# Patient Record
Sex: FEMALE | Employment: FULL TIME | URBAN - METROPOLITAN AREA
[De-identification: names, ages, dates, MRNs, and addresses within clinical notes are randomized per-mention and may not be internally consistent; named-entity substitution may affect disease eponyms.]

---

## 2017-01-23 DIAGNOSIS — I10 ESSENTIAL HYPERTENSION WITH GOAL BLOOD PRESSURE LESS THAN 140/90: Primary | ICD-10-CM

## 2017-01-23 DIAGNOSIS — I10 HYPERTENSION GOAL BP (BLOOD PRESSURE) < 140/90: ICD-10-CM

## 2017-01-23 RX ORDER — LISINOPRIL 10 MG/1
10 TABLET ORAL DAILY
Qty: 30 TABLET | Refills: 0 | Status: SHIPPED | OUTPATIENT
Start: 2017-01-23 | End: 2017-03-27 | Stop reason: SINTOL

## 2017-01-23 RX ORDER — METOPROLOL SUCCINATE 100 MG/1
100 TABLET, EXTENDED RELEASE ORAL DAILY
Qty: 30 TABLET | Refills: 0 | Status: SHIPPED | OUTPATIENT
Start: 2017-01-23 | End: 2017-03-03

## 2017-01-26 ENCOUNTER — TELEPHONE (OUTPATIENT)
Dept: FAMILY MEDICINE | Facility: CLINIC | Age: 57
End: 2017-01-26

## 2017-01-26 DIAGNOSIS — I10 HYPERTENSION GOAL BP (BLOOD PRESSURE) < 140/90: Primary | ICD-10-CM

## 2017-01-26 NOTE — TELEPHONE ENCOUNTER
Amlodipine Besylate 10mg      Last Written Prescription Date: 02/29/2016 #90 x 1  Last filled 12/28/2016  Last Office Visit with [facility], [facility] or [facility] prescribing provider:  02/29/2016 STEPHEN Langley     Future Office Visit:    Next 5 appointments (look out 90 days)     Feb 06, 2017 10:20 AM   PHYSICAL with ASCENCION Guzman CNP   [facility]    [address] [id number] [phone]                    BP Readings from Last 3 Encounters:   11/10/16 174/112   03/30/16 150/98   02/29/16 140/92

## 2017-01-26 NOTE — TELEPHONE ENCOUNTER
Panel Management Review      Patient has the following on her problem list:     Hypertension   Last three blood pressure readings:  BP Readings from Last 3 Encounters:   11/10/16 174/112   03/30/16 150/98   02/29/16 140/92     Blood pressure: Failed    HTN Guidelines:  Age 18-59 BP range:  Less than 140/90  Age 60-85 with Diabetes:  Less than 140/90  Age 60-85 without Diabetes:  less than 150/90      Composite cancer screening  Chart review shows that this patient is due/due soon for the following Mammogram  Summary:    Patient is due/failing the following:   Blood pressure and mammogram    Action needed:   Patient needs nurse only appointment. and Patient needs referral/order: mammogram    Type of outreach:    none, patient has an appt scheduled for 2/6/17 will address at that time    Questions for provider review:    None                                                                                                                                    Mary Lou BENAVIDEZ       Chart routed to none .

## 2017-01-27 NOTE — TELEPHONE ENCOUNTER
Routing refill request to provider for review/approval because:  BP above goal    Rossy Bautista RN

## 2017-01-29 RX ORDER — AMLODIPINE BESYLATE 10 MG/1
10 TABLET ORAL DAILY
Qty: 90 TABLET | Refills: 1 | Status: SHIPPED | OUTPATIENT
Start: 2017-01-29 | End: 2017-03-27

## 2017-02-21 ENCOUNTER — TELEPHONE (OUTPATIENT)
Dept: FAMILY MEDICINE | Facility: CLINIC | Age: 57
End: 2017-02-21

## 2017-02-21 NOTE — LETTER
[facility]  [address] [place] [id number]  [phone]      February 21, 2017      Caty Llamas  [address]  [place] [address]        Dear Caty,     As part of [facility]'s commitment to health and wellness we have recently reviewed your chart and your medical record indicates that you are due for one or more of the following:    -- Blood pressure check. I noticed that your blood pressure was elevated at a recent visit.  Please call to schedule a nurse only visit to have it rechecked.    -- Mammogram. Please call one of the following numbers to schedule:  [facility] [phone]  [facility] [phone]  [facility] [phone]  [facility] [phone]  [facility] [phone]  [facility] [phone]    Please try to schedule and/or complete the tests above within the next 2-4 weeks.   The number to call to schedule an appointment at [facility] is [phone].    While we work hard to maintain accurate records on all our patients, it is always possible that this notice does not accurately reflect tests that you may have had. To ensure that we do not continue to send you notices please verify, at your next visit or by a mytraxt message, that we have accurate dates of your tests, even if these were done many years ago.     Working together for your health is our goal.    Thank you for choosing [facility] for your healthcare needs.      Sincerely,     ANGEL Winn/ iris

## 2017-02-21 NOTE — TELEPHONE ENCOUNTER
Panel Management Review      Patient has the following on her problem list:     Hypertension   Last three blood pressure readings:  BP Readings from Last 3 Encounters:   11/10/16 (!) 174/112   03/30/16 (!) 150/98   02/29/16 (!) 140/92     Blood pressure: Failed    HTN Guidelines:  Age 18-59 BP range:  Less than 140/90  Age 60-85 with Diabetes:  Less than 140/90  Age 60-85 without Diabetes:  less than 150/90      Composite cancer screening  Chart review shows that this patient is due/due soon for the following Mammogram  Summary:    Patient is due/failing the following:   MAMMOGRAM and BP     Action needed:   Orders for MAMMOGRAM; Nurse only for BP CHECK    Type of outreach:    Sent letter.    Questions for provider review:    None                                                                                                                                    Miryam Castrejon CMA (AAMA)       Chart routed to None .

## 2017-03-03 DIAGNOSIS — I10 HYPERTENSION GOAL BP (BLOOD PRESSURE) < 140/90: ICD-10-CM

## 2017-03-03 NOTE — TELEPHONE ENCOUNTER
Routing refill request to provider for review/approval because:  Patient needs to be seen because it has been more than 1 year since last office visit.    Rossy Bautista RN

## 2017-03-03 NOTE — TELEPHONE ENCOUNTER
Reason for Call:  Medication or medication refill:    Do you use a [facility]?  Name of the pharmacy and phone number for the current request:  See above    Name of the medication requested: metoprolol    Other request: patient ask if possible could she get it sent over today.    Can we leave a detailed message on this number? YES    Phone number patient can be reached at: Home number on file [phone] (home)    Best Time:     Call taken on 3/3/2017 at 1:31 PM by Analia Mary

## 2017-03-05 RX ORDER — METOPROLOL SUCCINATE 100 MG/1
100 TABLET, EXTENDED RELEASE ORAL DAILY
Qty: 30 TABLET | Refills: 0 | Status: SHIPPED | OUTPATIENT
Start: 2017-03-05 | End: 2017-03-27

## 2017-03-27 ENCOUNTER — OFFICE VISIT (OUTPATIENT)
Dept: FAMILY MEDICINE | Facility: CLINIC | Age: 57
End: 2017-03-27
Payer: COMMERCIAL

## 2017-03-27 ENCOUNTER — RADIANT APPOINTMENT (OUTPATIENT)
Dept: GENERAL RADIOLOGY | Facility: CLINIC | Age: 57
End: 2017-03-27
Attending: NURSE PRACTITIONER
Payer: COMMERCIAL

## 2017-03-27 ENCOUNTER — RESULT FOLLOW UP (OUTPATIENT)
Dept: FAMILY MEDICINE | Facility: CLINIC | Age: 57
End: 2017-03-27

## 2017-03-27 VITALS
DIASTOLIC BLOOD PRESSURE: 90 MMHG | SYSTOLIC BLOOD PRESSURE: 130 MMHG | HEART RATE: 80 BPM | WEIGHT: 222.8 LBS | TEMPERATURE: 98.1 F | BODY MASS INDEX: 41 KG/M2 | HEIGHT: 62 IN

## 2017-03-27 DIAGNOSIS — G89.29 CHRONIC PAIN OF LEFT KNEE: ICD-10-CM

## 2017-03-27 DIAGNOSIS — Z13.6 CARDIOVASCULAR SCREENING; LDL GOAL LESS THAN 160: ICD-10-CM

## 2017-03-27 DIAGNOSIS — R05.9 COUGH: ICD-10-CM

## 2017-03-27 DIAGNOSIS — R87.612 PAPANICOLAOU SMEAR OF CERVIX WITH LOW GRADE SQUAMOUS INTRAEPITHELIAL LESION (LGSIL): ICD-10-CM

## 2017-03-27 DIAGNOSIS — I10 HYPERTENSION GOAL BP (BLOOD PRESSURE) < 140/90: ICD-10-CM

## 2017-03-27 DIAGNOSIS — Z12.31 VISIT FOR SCREENING MAMMOGRAM: ICD-10-CM

## 2017-03-27 DIAGNOSIS — E66.01 MORBID OBESITY DUE TO EXCESS CALORIES (H): ICD-10-CM

## 2017-03-27 DIAGNOSIS — Z11.59 NEED FOR HEPATITIS C SCREENING TEST: ICD-10-CM

## 2017-03-27 DIAGNOSIS — H91.93 BILATERAL CHANGE IN HEARING: ICD-10-CM

## 2017-03-27 DIAGNOSIS — Z01.419 ENCOUNTER FOR GYNECOLOGICAL EXAMINATION WITHOUT ABNORMAL FINDING: Primary | ICD-10-CM

## 2017-03-27 DIAGNOSIS — M25.562 CHRONIC PAIN OF LEFT KNEE: ICD-10-CM

## 2017-03-27 LAB
ANION GAP SERPL CALCULATED.3IONS-SCNC: 5 MMOL/L (ref 3–14)
BUN SERPL-MCNC: 17 MG/DL (ref 7–30)
CALCIUM SERPL-MCNC: 8.9 MG/DL (ref 8.5–10.1)
CHLORIDE SERPL-SCNC: 106 MMOL/L (ref 94–109)
CHOLEST SERPL-MCNC: 217 MG/DL
CO2 SERPL-SCNC: 27 MMOL/L (ref 20–32)
CREAT SERPL-MCNC: 0.7 MG/DL (ref 0.52–1.04)
CREAT UR-MCNC: 42 MG/DL
GFR SERPL CREATININE-BSD FRML MDRD: 85 ML/MIN/1.7M2
GLUCOSE SERPL-MCNC: 98 MG/DL (ref 70–99)
HDLC SERPL-MCNC: 58 MG/DL
LDLC SERPL CALC-MCNC: 143 MG/DL
MICROALBUMIN UR-MCNC: 6 MG/L
MICROALBUMIN/CREAT UR: 13.06 MG/G CR (ref 0–25)
NONHDLC SERPL-MCNC: 159 MG/DL
POTASSIUM SERPL-SCNC: 3.7 MMOL/L (ref 3.4–5.3)
SODIUM SERPL-SCNC: 138 MMOL/L (ref 133–144)
TRIGL SERPL-MCNC: 79 MG/DL

## 2017-03-27 PROCEDURE — 99396 PREV VISIT EST AGE 40-64: CPT | Performed by: NURSE PRACTITIONER

## 2017-03-27 PROCEDURE — 36415 COLL VENOUS BLD VENIPUNCTURE: CPT | Performed by: NURSE PRACTITIONER

## 2017-03-27 PROCEDURE — G0145 SCR C/V CYTO,THINLAYER,RESCR: HCPCS | Performed by: NURSE PRACTITIONER

## 2017-03-27 PROCEDURE — 71020 XR CHEST 2 VW: CPT

## 2017-03-27 PROCEDURE — 80048 BASIC METABOLIC PNL TOTAL CA: CPT | Performed by: NURSE PRACTITIONER

## 2017-03-27 PROCEDURE — 86803 HEPATITIS C AB TEST: CPT | Performed by: NURSE PRACTITIONER

## 2017-03-27 PROCEDURE — 80061 LIPID PANEL: CPT | Performed by: NURSE PRACTITIONER

## 2017-03-27 PROCEDURE — 87624 HPV HI-RISK TYP POOLED RSLT: CPT | Performed by: NURSE PRACTITIONER

## 2017-03-27 PROCEDURE — 82043 UR ALBUMIN QUANTITATIVE: CPT | Performed by: NURSE PRACTITIONER

## 2017-03-27 RX ORDER — METOPROLOL SUCCINATE 100 MG/1
100 TABLET, EXTENDED RELEASE ORAL DAILY
Qty: 90 TABLET | Refills: 3 | Status: SHIPPED | OUTPATIENT
Start: 2017-03-27 | End: 2017-05-17

## 2017-03-27 RX ORDER — METOPROLOL SUCCINATE 100 MG/1
100 TABLET, EXTENDED RELEASE ORAL DAILY
Qty: 30 TABLET | Refills: 0 | Status: SHIPPED | OUTPATIENT
Start: 2017-03-27 | End: 2017-03-27

## 2017-03-27 RX ORDER — AMLODIPINE BESYLATE 10 MG/1
10 TABLET ORAL DAILY
Qty: 90 TABLET | Refills: 3 | Status: SHIPPED | OUTPATIENT
Start: 2017-03-27 | End: 2017-05-17

## 2017-03-27 NOTE — MR AVS SNAPSHOT
After Visit Summary   3/27/2017    Caty Llamas    MRN: [id number]           Patient Information     Date Of Birth          1960        Visit Information        Provider Department      3/27/2017 10:20 AM Cass Langley APRN [facility]        Today's Diagnoses     Encounter for gynecological examination without abnormal finding    -  1    Visit for screening mammogram        Hypertension goal BP (blood pressure) < 140/90        Papanicolaou smear of cervix with low grade squamous intraepithelial lesion (LGSIL)        Need for hepatitis C screening test        CARDIOVASCULAR SCREENING; LDL GOAL LESS THAN 160        Morbid obesity due to excess calories (H)        Bilateral change in hearing        Cough        Chronic pain of left knee          Care Instructions      Preventive Health Recommendations  Female Ages 50 - 64    Yearly exam: See your health care provider every year in order to  o Review health changes.   o Discuss preventive care.    o Review your medicines if your doctor has prescribed any.      Get a Pap test every three years (unless you have an abnormal result and your provider advises testing more often).    If you get Pap tests with HPV test, you only need to test every 5 years, unless you have an abnormal result.     You do not need a Pap test if your uterus was removed (hysterectomy) and you have not had cancer.    You should be tested each year for STDs (sexually transmitted diseases) if you're at risk.     Have a mammogram every 1 to 2 years.    Have a colonoscopy at age 50, or have a yearly FIT test (stool test). These exams screen for colon cancer.      Have a cholesterol test every 5 years, or more often if advised.    Have a diabetes test (fasting glucose) every three years. If you are at risk for diabetes, you should have this test more often.     If you are at risk for osteoporosis (brittle bone disease), think about having a bone density  scan (DEXA).    Shots: Get a flu shot each year. Get a tetanus shot every 10 years.    Nutrition:     Eat at least 5 servings of fruits and vegetables each day.    Eat whole-grain bread, whole-wheat pasta and brown rice instead of white grains and rice.    Talk to your provider about Calcium and Vitamin D.     Lifestyle    Exercise at least 150 minutes a week (30 minutes a day, 5 days a week). This will help you control your weight and prevent disease.    Limit alcohol to one drink per day.    No smoking.     Wear sunscreen to prevent skin cancer.     See your dentist every six months for an exam and cleaning.    See your eye doctor every 1 to 2 years.      Work on weight loss  For weight loss check [employer] This is a free web site for weight loss.       Stay well hydrated - urine should be clear.      Exercise   30-60 min daily    Get your ears checked and then see ENT    Wear the knee brace during day and off at night     For the chest x-ray  I will send you the results.     No medication changes at this time                 Follow-ups after your visit        Additional Services     AUDIOLOGY ADULT REFERRAL       Your provider has referred you to: [facility]: [facility] [phone]   [email or web address]    Specialty Testing:  Audiogram w/Tymps and Reflexes (Comprehensive Audiology Evaluation)            OTOLARYNGOLOGY REFERRAL       Your provider has referred you to: [facility]: [facility] [phone]   [email or web address]    Please be aware that coverage of these services is subject to the terms and limitations of your health insurance plan.  Call member services at your health plan with any benefit or coverage questions.      Please bring the following with you to your appointment:    (1) Any X-Rays, CTs or MRIs which have been performed.  Contact the facility where they were done to arrange for  prior to your  "scheduled appointment.   (2) List of current medications  (3) This referral request   (4) Any documents/labs given to you for this referral                  Future tests that were ordered for you today     Open Future Orders        Priority Expected Expires Ordered    XR Chest 2 Views Routine 3/27/2017 3/27/2018 3/27/2017    MA Screening Digital Bilateral Routine  3/28/2018 3/27/2017            Who to contact     Normal or non-critical lab and imaging results will be communicated to you by [employer]art, letter or phone within 4 business days after the clinic has received the results. If you do not hear from us within 7 days, please contact the clinic through [employer] or phone. If you have a critical or abnormal lab result, we will notify you by phone as soon as possible.  Submit refill requests through [employer] or call your pharmacy and they will forward the refill request to us. Please allow 3 business days for your refill to be completed.          If you need to speak with a  for additional information , please call: [phone]           Additional Information About Your Visit        [employer] Information     [employer] lets you send messages to your doctor, view your test results, renew your prescriptions, schedule appointments and more. To sign up, go to [email or web address] . Click on \"Log in\" on the left side of the screen, which will take you to the Welcome page. Then click on \"Sign up Now\" on the right side of the page.     You will be asked to enter the access code listed below, as well as some personal information. Please follow the directions to create your username and password.     Your access code is: [email or web address]  Expires: 2017 12:24 PM     Your access code will  in 90 days. If you need help or a new code, please call your [place] clinic or [phone].        Care EveryWhere ID     This is your Care EveryWhere ID. This could be used by other organizations to access your " "[place] medical records  [id number]        Your Vitals Were     Pulse Temperature Height Breastfeeding? BMI (Body Mass Index)       80 98.1  F (36.7  C) (Tympanic) 5' 2.4\" (1.585 m) No 40.23 kg/m2        Blood Pressure from Last 3 Encounters:   03/27/17 130/90   11/10/16 (!) 174/112   03/30/16 (!) 150/98    Weight from Last 3 Encounters:   03/27/17 222 lb 12.8 oz (101.1 kg)   03/30/16 217 lb (98.4 kg)   02/29/16 214 lb 12.8 oz (97.4 kg)              We Performed the Following     **Hepatitis C Screen Reflex to RNA FUTURE anytime     Albumin Random Urine Quantitative     AUDIOLOGY ADULT REFERRAL     Basic metabolic panel  (Ca, Cl, CO2, Creat, Gluc, K, Na, BUN)     HPV High Risk Types DNA Cervical     Lipid Profile with reflex to direct LDL     OTOLARYNGOLOGY REFERRAL     Pap imaged thin layer screen with HPV - recommended age 30 - 65 years (select HPV order below)          Today's Medication Changes          These changes are accurate as of: 3/27/17 11:33 AM.  If you have any questions, ask your nurse or doctor.               Start taking these medicines.        Dose/Directions    metoprolol 100 MG 24 hr tablet   Commonly known as:  TOPROL-XL   Used for:  Hypertension goal BP (blood pressure) < 140/90   Started by:  Cass Langley APRN CNP        Dose:  100 mg   Take 1 tablet (100 mg) by mouth daily   Quantity:  90 tablet   Refills:  3       order for DME   Used for:  Chronic pain of left knee   Started by:  Cass Langley APRN CNP        Equipment being ordered: knee brace,   Quantity:  1 Device   Refills:  0         These medicines have changed or have updated prescriptions.        Dose/Directions    amLODIPine 10 MG tablet   Commonly known as:  NORVASC   This may have changed:  additional instructions   Used for:  Hypertension goal BP (blood pressure) < 140/90   Changed by:  Cass Langley APRN CNP        Dose:  10 mg   Take 1 tablet (10 mg) by mouth daily   Quantity:  90 tablet "   Refills:  3         Stop taking these medicines if you haven't already. Please contact your care team if you have questions.     lisinopril 10 MG tablet   Commonly known as:  PRINIVIL/ZESTRIL   Stopped by:  Cass Langley APRN CNP                Where to get your medicines      These medications were sent to [facility]/pharmacy #[id number] - [place], [place] - [address],  AT [address] [facility]  [address], , [facility] [address]     Phone:  [phone]     amLODIPine 10 MG tablet    metoprolol 100 MG 24 hr tablet         Some of these will need a paper prescription and others can be bought over the counter.  Ask your nurse if you have questions.     Bring a paper prescription for each of these medications     order for DME                Primary Care Provider Office Phone # Fax #    ASCENCION Guzman [phone]       [facility] [address] [place] [address]   [facility] [address]        Thank you!     Thank you for choosing [facility]  for your care. Our goal is always to provide you with excellent care. Hearing back from our patients is one way we can continue to improve our services. Please take a few minutes to complete the written survey that you may receive in the mail after your visit with us. Thank you!             Your Updated Medication List - Protect others around you: Learn how to safely use, store and throw away your medicines at www.disposemymeds.org.          This list is accurate as of: 3/27/17 11:33 AM.  Always use your most recent med list.                   Brand Name Dispense Instructions for use    amLODIPine 10 MG tablet    NORVASC    90 tablet    Take 1 tablet (10 mg) by mouth daily       metoprolol 100 MG 24 hr tablet    TOPROL-XL    90 tablet    Take 1 tablet (100 mg) by mouth daily       order for DME     1 Units    Equipment being ordered: home blood pressure monitoring device       order for DME     1 Device     Equipment being ordered: knee brace,

## 2017-03-27 NOTE — LETTER
April 18, 2018      Caty Sheikhirez  [address]  [place] [address]    Dear ,      At [facility], your health and wellness is our primary concern. That is why we are following up on a colposcopy from 5/2/17. Your provider had recommended that you have a Pap smear and HPV test completed by 5/2/18. Our records do not show that this has been scheduled.    It is important to complete the follow up that your provider has suggested for you to ensure that there are no worsening changes which may, over time, develop into cancer.      Please contact our office at  [phone] to schedule an appointment for a Pap smear and HPV test at your earliest convenience. If you have questions or concerns, please call the clinic and we will be happy to assist you.    If you have completed the tests outside of [facility], please have the results forwarded to our office. We will update the chart for your primary Physician to review before your next annual physical.     Thank you for choosing [facility]!    Sincerely,      ADELA EMERSON NP, APRN CNP/rlm

## 2017-03-27 NOTE — LETTER
"[facility]  [address] [place]  [address]  [phone]      April 3, 2017      Caty Sheikhirez  [address] [place] [address]              Dear Ms. Llamas,    The results of your recent glucose (a screening test for diabetes), kidney test, electrolytes (sodium, potassium, etc.) This measures body salts which are affected by medications and kidney function., HDL \"Good Cholesterol\", triglycerides , Hep C  Urine microalbumin} were normal.     The results of your recent cholesterol , LDL \"Bad Cholesterol\"   were  abnormal.     The 10-year ASCVD risk score (Ada ALBERTO Jr, et al., 2013) is: 4.2% at the risk factor of 4.2 you do need to work on weight loss,  Exercise 30-60  Min daily    Limit the simple sugars and the simple carbohydrates   The Mediterranean diet is very good to improve your lipid profile       Values used to calculate the score:       Age: 57 years       Sex: Female       Is Non- : No       Diabetic: No       Tobacco smoker: No       Systolic Blood Pressure: 130 mmHg       Is BP treated: Yes       HDL Cholesterol: 58 mg/dL       Total Cholesterol: 217 mg/dL       Please note that test explanations are brief and do not reflect all diagnostic uses.     Please make a follow-up appointment if you have additional questions.       Sincerely,    ANGEL Winn/BHARGAVI CMA    "

## 2017-03-27 NOTE — PROGRESS NOTES
SUBJECTIVE:     CC: Caty Llamas is an 57 year old woman who presents for preventive health visit.     Healthy Habits:    Do you get at least three servings of calcium containing foods daily (dairy, green leafy vegetables, etc.)? yes    Amount of exercise or daily activities, outside of work: 4 day(s) per week    Problems taking medications regularly No    Medication side effects: No    Have you had an eye exam in the past two years? yes    Do you see a dentist twice per year? no  Do you have sleep apnea, excessive snoring or daytime drowsiness?no      Today's PHQ-2 Score:   PHQ-2 ( 1999 [employer]) 3/27/2017 2/29/2016   Q1: Little interest or pleasure in doing things 0 0   Q2: Feeling down, depressed or hopeless 0 1   PHQ-2 Score 0 1       Abuse: Current or Past(Physical, Sexual or Emotional)- No  Do you feel safe in your environment - Yes    Social History   Substance Use Topics     Smoking status: Never Smoker     Smokeless tobacco: Never Used      Comment: smoke every blue moon     Alcohol use Yes      Comment: very seldom     The patient does not drink >3 drinks per day nor >7 drinks per week.    Recent Labs   Lab Test  08/14/14   1023  03/02/10   0900   CHOL  199  226*   HDL  47*  59   LDL  127  146*   TRIG  123  107   CHOLHDLRATIO  4.2  4.0       Reviewed orders with patient.  Reviewed health maintenance and updated orders accordingly - Yes    Mammo Decision Support:  Patient over age 50, mutual decision to screen reflected in health maintenance.    Pertinent mammograms are reviewed under the imaging tab.  History of abnormal Pap smear: YES - updated in Problem List and Health Maintenance accordingly    Reviewed and updated as needed this visit by clinical staff  Tobacco  Allergies  Meds  Med Hx  Surg Hx  Fam Hx  Soc Hx        Reviewed and updated as needed this visit by Provider        Was in a funk but that has resolved.  Work is going well.  Did get a $2/hr raise  Is now in a relationship with  another female.  Things are going well      ROS:  CONSTITUTIONAL:NEGATIVE for fever, chills,  and POSITIVE  for weight gain  I: NEGATIVE for worrisome rashes, moles or lesions  E: NEGATIVE for vision changes or irritation  POSITIVE for a flap that is intermittent   ENT: NEGATIVE for ear, mouth and throat problems POSITIVE for high pitch sounds.  ,.  Worse when trying to sleep.     RESP:POSITIVE for cough-productive and at times  Would like to have a CXR to make sure that her lungs have cleared   B: NEGATIVE for masses, tenderness or discharge  CV: NEGATIVE for chest pain, palpitations or peripheral edema  GI: NEGATIVE for nausea, abdominal pain, heartburn, or change in bowel habits  : NEGATIVE for unusual urinary or vaginal symptoms. No vaginal bleeding.  MUSCULOSKELETAL:POSITIVE  for joint pain left knee pain.  When walking on the treadmill the left knee will swell.  No falls or known injury .  But did start to run just before the knee started/ the knee will swell behind the knee   N: NEGATIVE for weakness, dizziness or paresthesias  E: NEGATIVE for temperature intolerance, skin/hair changes  H: NEGATIVE for bleeding problems  P: NEGATIVE for changes in mood or affect     Problem list, Medication list, Allergies, and Medical/Social/Surgical histories reviewed in [facility] and updated as appropriate.  Labs reviewed in EPIC  BP Readings from Last 3 Encounters:   03/27/17 130/90   11/10/16 (!) 174/112   03/30/16 (!) 150/98    Wt Readings from Last 3 Encounters:   03/27/17 222 lb 12.8 oz (101.1 kg)   03/30/16 217 lb (98.4 kg)   02/29/16 214 lb 12.8 oz (97.4 kg)                  Patient Active Problem List   Diagnosis     Onychomycosis bilat great toenails.      CARDIOVASCULAR SCREENING; LDL GOAL LESS THAN 160     Hypertension goal BP (blood pressure) < 140/90     Papanicolaou smear of cervix with low grade squamous intraepithelial lesion (LGSIL)     Shoulder pain, right     Advanced directives, HC packet sent 3/5/2013      "Morbid obesity due to excess calories (H)     Adjustment disorder with anxious mood     Past Surgical History:   Procedure Laterality Date     ARTHROSCOPY KNEE RT/LT  1988, 1994    left knee       Social History   Substance Use Topics     Smoking status: Never Smoker     Smokeless tobacco: Never Used      Comment: smoke every blue moon     Alcohol use Yes      Comment: very seldom     Family History   Problem Relation Age of Onset     C.A.D. Father      CEREBROVASCULAR DISEASE Father      CANCER Father      DIABETES Sister      Hypertension Sister      DIABETES Brother      DIABETES Sister      Hypertension Sister          Current Outpatient Prescriptions   Medication Sig Dispense Refill     metoprolol (TOPROL-XL) 100 MG 24 hr tablet Take 1 tablet (100 mg) by mouth daily DUE for office visit /Blood pressure check 30 tablet 0     amLODIPine (NORVASC) 10 MG tablet Take 1 tablet (10 mg) by mouth daily Please check blood pressure 90 tablet 1     lisinopril (PRINIVIL/ZESTRIL) 10 MG tablet Take 1 tablet (10 mg) by mouth daily (Patient not taking: Reported on 3/27/2017) 30 tablet 0     sertraline (ZOLOFT) 50 MG tablet Take 1 tablet (50 mg) by mouth daily (Patient not taking: Reported on 3/27/2017) 30 tablet 0     ORDER FOR DME Equipment being ordered: home blood pressure monitoring device 1 Units 0     Allergies   Allergen Reactions     Salicylates      ferenol     OBJECTIVE:     /90  Pulse 80  Temp 98.1  F (36.7  C) (Tympanic)  Ht 5' 2.4\" (1.585 m)  Wt 222 lb 12.8 oz (101.1 kg)  Breastfeeding? No  BMI 40.23 kg/m2  EXAM:  GENERAL APPEARANCE: healthy, alert and no distress  EYES: Eyes grossly normal to inspection, PERRL and conjunctivae and sclerae normal  HENT: ear canals and TM's normal, nose and mouth without ulcers or lesions, oropharynx clear and oral mucous membranes moist  NECK: no adenopathy, no asymmetry, masses, or scars and thyroid normal to palpation  RESP: lungs clear to auscultation - no rales, " rhonchi or wheezes  BREAST: normal without masses, tenderness or nipple discharge and no palpable axillary masses or adenopathy  CV: regular rate and rhythm, normal S1 S2, no S3 or S4, no murmur, click or rub, no peripheral edema and peripheral pulses strong  ABDOMEN: soft, nontender, no hepatosplenomegaly, no masses and bowel sounds normal   (female): normal female external genitalia, normal urethral meatus, vaginal mucosal atrophy noted and normal cervix, adnexae, and uterus without masses.  MS: gait is age appropriate without ataxia, left knee does have good ROM with tenderness behind the knee . Does feel like a Bakers cyst   SKIN: no suspicious lesions or rashes  NEURO: Normal strength and tone, sensory exam grossly normal, mentation intact and speech normal  PSYCH: mentation appears normal and affect normal/bright    ASSESSMENT/PLAN:       ASSESSMENT/PLAN:      ICD-10-CM    1. Encounter for gynecological examination without abnormal finding Z01.419 Pap imaged thin layer screen with HPV - recommended age 30 - 65 years (select HPV order below)     HPV High Risk Types DNA Cervical   2. Visit for screening mammogram Z12.31 MA Screening Digital Bilateral   3. Hypertension goal BP (blood pressure) < 140/90 I10 Albumin Random Urine Quantitative     Basic metabolic panel  (Ca, Cl, CO2, Creat, Gluc, K, Na, BUN)     metoprolol (TOPROL-XL) 100 MG 24 hr tablet     amLODIPine (NORVASC) 10 MG tablet     DISCONTINUED: metoprolol (TOPROL-XL) 100 MG 24 hr tablet   4. Papanicolaou smear of cervix with low grade squamous intraepithelial lesion (LGSIL) R87.612 Pap imaged thin layer screen with HPV - recommended age 30 - 65 years (select HPV order below)     HPV High Risk Types DNA Cervical   5. Need for hepatitis C screening test Z11.59 **Hepatitis C Screen Reflex to RNA FUTURE anytime   6. CARDIOVASCULAR SCREENING; LDL GOAL LESS THAN 160 Z13.6 Lipid Profile with reflex to direct LDL   7. Morbid obesity due to excess calories  (H) E66.01    8. Bilateral change in hearing H91.93 AUDIOLOGY ADULT REFERRAL     OTOLARYNGOLOGY REFERRAL   9. Cough R05 XR Chest 2 Views   10. Chronic pain of left knee M25.562 order for DME    G89.29        Patient Instructions     Preventive Health Recommendations  Female Ages 50 - 64    Yearly exam: See your health care provider every year in order to  o Review health changes.   o Discuss preventive care.    o Review your medicines if your doctor has prescribed any.      Get a Pap test every three years (unless you have an abnormal result and your provider advises testing more often).    If you get Pap tests with HPV test, you only need to test every 5 years, unless you have an abnormal result.     You do not need a Pap test if your uterus was removed (hysterectomy) and you have not had cancer.    You should be tested each year for STDs (sexually transmitted diseases) if you're at risk.     Have a mammogram every 1 to 2 years.    Have a colonoscopy at age 50, or have a yearly FIT test (stool test). These exams screen for colon cancer.      Have a cholesterol test every 5 years, or more often if advised.    Have a diabetes test (fasting glucose) every three years. If you are at risk for diabetes, you should have this test more often.     If you are at risk for osteoporosis (brittle bone disease), think about having a bone density scan (DEXA).    Shots: Get a flu shot each year. Get a tetanus shot every 10 years.    Nutrition:     Eat at least 5 servings of fruits and vegetables each day.    Eat whole-grain bread, whole-wheat pasta and brown rice instead of white grains and rice.    Talk to your provider about Calcium and Vitamin D.     Lifestyle    Exercise at least 150 minutes a week (30 minutes a day, 5 days a week). This will help you control your weight and prevent disease.    Limit alcohol to one drink per day.    No smoking.     Wear sunscreen to prevent skin cancer.     See your dentist every six months for  "an exam and cleaning.    See your eye doctor every 1 to 2 years.      Work on weight loss  For weight loss check our [employer] This is a free web site for weight loss.       Stay well hydrated - urine should be clear.      Exercise   30-60 min daily    Get your ears checked and then see ENT    Wear the knee brace during day and off at night     For the chest x-ray  I will send you the results.     No medication changes at this time                     COUNSELING:   Reviewed preventive health counseling, as reflected in patient instructions       Regular exercise       Healthy diet/nutrition       Vision screening       Osteoporosis Prevention/Bone Health       Colon cancer screening       Advance Care Planning         reports that she has never smoked. She has never used smokeless tobacco.    Estimated body mass index is 40.23 kg/(m^2) as calculated from the following:    Height as of this encounter: 5' 2.4\" (1.585 m).    Weight as of this encounter: 222 lb 12.8 oz (101.1 kg).   Weight management plan: Discussed healthy diet and exercise guidelines and patient will follow up in 6 months in clinic to re-evaluate.    Counseling Resources:  ATP IV Guidelines  Pooled Cohorts Equation Calculator  Breast Cancer Risk Calculator  FRAX Risk Assessment  ICSI Preventive Guidelines  Dietary Guidelines for Americans, 2010  USDA's MyPlate  ASA Prophylaxis  Lung CA Screening    ADELA EMERSON NP, APRN CNP  [facility]  "

## 2017-03-27 NOTE — PATIENT INSTRUCTIONS
Preventive Health Recommendations  Female Ages 50 - 64    Yearly exam: See your health care provider every year in order to  o Review health changes.   o Discuss preventive care.    o Review your medicines if your doctor has prescribed any.      Get a Pap test every three years (unless you have an abnormal result and your provider advises testing more often).    If you get Pap tests with HPV test, you only need to test every 5 years, unless you have an abnormal result.     You do not need a Pap test if your uterus was removed (hysterectomy) and you have not had cancer.    You should be tested each year for STDs (sexually transmitted diseases) if you're at risk.     Have a mammogram every 1 to 2 years.    Have a colonoscopy at age 50, or have a yearly FIT test (stool test). These exams screen for colon cancer.      Have a cholesterol test every 5 years, or more often if advised.    Have a diabetes test (fasting glucose) every three years. If you are at risk for diabetes, you should have this test more often.     If you are at risk for osteoporosis (brittle bone disease), think about having a bone density scan (DEXA).    Shots: Get a flu shot each year. Get a tetanus shot every 10 years.    Nutrition:     Eat at least 5 servings of fruits and vegetables each day.    Eat whole-grain bread, whole-wheat pasta and brown rice instead of white grains and rice.    Talk to your provider about Calcium and Vitamin D.     Lifestyle    Exercise at least 150 minutes a week (30 minutes a day, 5 days a week). This will help you control your weight and prevent disease.    Limit alcohol to one drink per day.    No smoking.     Wear sunscreen to prevent skin cancer.     See your dentist every six months for an exam and cleaning.    See your eye doctor every 1 to 2 years.      Work on weight loss  For weight loss check [employer] This is a free web site for weight loss.       Stay well hydrated - urine should be clear.       Exercise   30-60 min daily    Get your ears checked and then see ENT    Wear the knee brace during day and off at night     For the chest x-ray  I will send you the results.     No medication changes at this time

## 2017-03-27 NOTE — NURSING NOTE
"Chief Complaint   Patient presents with     Physical       Initial /90  Pulse 80  Temp 98.1  F (36.7  C) (Tympanic)  Ht 5' 2.4\" (1.585 m)  Wt 222 lb 12.8 oz (101.1 kg)  Breastfeeding? No  BMI 40.23 kg/m2 Estimated body mass index is 40.23 kg/(m^2) as calculated from the following:    Height as of this encounter: 5' 2.4\" (1.585 m).    Weight as of this encounter: 222 lb 12.8 oz (101.1 kg).  Medication Reconciliation: complete     Sheila Avila CMA      "

## 2017-03-28 LAB — HCV AB SERPL QL IA: NORMAL

## 2017-03-29 LAB
COPATH REPORT: NORMAL
PAP: NORMAL

## 2017-03-30 LAB
FINAL DIAGNOSIS: ABNORMAL
HPV HR 12 DNA CVX QL NAA+PROBE: POSITIVE
HPV16 DNA SPEC QL NAA+PROBE: NEGATIVE
HPV18 DNA SPEC QL NAA+PROBE: NEGATIVE
SPECIMEN DESCRIPTION: ABNORMAL

## 2017-04-06 NOTE — PROGRESS NOTES
12/7/11:Pap--LSIL.   12/27/11: [place] WNL. Plan: HPV DNA PCR in 12 months. If +, colposcopy; if negative, back to screening (annual). Whitney Tirado. Reminder placed in epic.   8/14/14:Pap--NIL, -HPV. Per ASCCP guidelines, repeat Pap + HPV cotesting in 3 years (2017).  3/27/17 NIL /+ HR HPV (not 16 or 18). Plan: colposcopy within 3 months. Due 6/27/17 4/6/17: Message left to return call.   4/6/17: Pt notified of result. She will call the [facility] to schedule the colposcopy.  5/2/17: [place] ECC - negative. Plan cotest in 1 year.   5/9/17: Message left to return call. (cristina)  5/9/17: Pt notified of result and f/u plan. (cristina)  4/18/18 Cotest reminder letter sent (rl)  11/14/18 TC and schedule at [facility]. ([facility])  11/28/18 Patient is lost to pap tracking follow-up. HUGHI routed to provider. ([facility])

## 2017-05-02 ENCOUNTER — OFFICE VISIT (OUTPATIENT)
Dept: OBGYN | Facility: CLINIC | Age: 57
End: 2017-05-02
Payer: COMMERCIAL

## 2017-05-02 VITALS
DIASTOLIC BLOOD PRESSURE: 71 MMHG | HEIGHT: 62 IN | HEART RATE: 67 BPM | BODY MASS INDEX: 40.85 KG/M2 | WEIGHT: 222 LBS | SYSTOLIC BLOOD PRESSURE: 145 MMHG

## 2017-05-02 DIAGNOSIS — R87.810 CERVICAL HIGH RISK HPV (HUMAN PAPILLOMAVIRUS) TEST POSITIVE: Primary | ICD-10-CM

## 2017-05-02 PROCEDURE — 57456 ENDOCERV CURETTAGE W/SCOPE: CPT | Performed by: OBSTETRICS & GYNECOLOGY

## 2017-05-02 PROCEDURE — 88305 TISSUE EXAM BY PATHOLOGIST: CPT | Performed by: OBSTETRICS & GYNECOLOGY

## 2017-05-02 NOTE — NURSING NOTE
"Initial /71 (BP Location: Right arm, Patient Position: Chair, Cuff Size: Adult Large)  Pulse 67  Ht 5' 2\" (1.575 m)  Wt 222 lb (100.7 kg)  BMI 40.6 kg/m2 Estimated body mass index is 40.6 kg/(m^2) as calculated from the following:    Height as of this encounter: 5' 2\" (1.575 m).    Weight as of this encounter: 222 lb (100.7 kg). .      "

## 2017-05-02 NOTE — MR AVS SNAPSHOT
"              After Visit Summary   2017    Caty Llamas    MRN: [id number]           Patient Information     Date Of Birth          1960        Visit Information        Provider Department      2017 1:00 PM Anahy Ruiz MD; [facility]        Today's Diagnoses     Cervical high risk HPV (human papillomavirus) test positive    -  1       Follow-ups after your visit        Who to contact     If you have questions or need follow up information about today's clinic visit or your schedule please contact [facility] directly at [phone].  Normal or non-critical lab and imaging results will be communicated to you by MyChart, letter or phone within 4 business days after the clinic has received the results. If you do not hear from us within 7 days, please contact the clinic through [facility] or phone. If you have a critical or abnormal lab result, we will notify you by phone as soon as possible.  Submit refill requests through [employer] or call your pharmacy and they will forward the refill request to us. Please allow 3 business days for your refill to be completed.          Additional Information About Your Visit        MyChart Information     [employer] lets you send messages to your doctor, view your test results, renew your prescriptions, schedule appointments and more. To sign up, go to [email or web address] . Click on \"Log in\" on the left side of the screen, which will take you to the Welcome page. Then click on \"Sign up Now\" on the right side of the page.     You will be asked to enter the access code listed below, as well as some personal information. Please follow the directions to create your username and password.     Your access code is: [email or web address]  Expires: 2017 12:24 PM     Your access code will  in 90 days. If you need help or a new code, please call your [facility] or [phone].        Care EveryWhere ID     This is your Care " "EveryWhere ID. This could be used by other organizations to access your [place] medical records  [id number]        Your Vitals Were     Pulse Height BMI (Body Mass Index)             67 5' 2\" (1.575 m) 40.6 kg/m2          Blood Pressure from Last 3 Encounters:   05/02/17 145/71   03/27/17 130/90   11/10/16 (!) 174/112    Weight from Last 3 Encounters:   05/02/17 222 lb (100.7 kg)   03/27/17 222 lb 12.8 oz (101.1 kg)   03/30/16 217 lb (98.4 kg)              We Performed the Following     COLP CERVIX/UPPER VAGINA W ENDOCERV CURETT     Surgical pathology exam        Primary Care Provider Office Phone # Fax #    ASCENCION Guzman [facility] [phone]       [facility] [address]   [place] [address]        Thank you!     Thank you for choosing [facility]  for your care. Our goal is always to provide you with excellent care. Hearing back from our patients is one way we can continue to improve our services. Please take a few minutes to complete the written survey that you may receive in the mail after your visit with us. Thank you!             Your Updated Medication List - Protect others around you: Learn how to safely use, store and throw away your medicines at www.disposemymeds.org.          This list is accurate as of: 5/2/17  1:54 PM.  Always use your most recent med list.                   Brand Name Dispense Instructions for use    amLODIPine 10 MG tablet    NORVASC    90 tablet    Take 1 tablet (10 mg) by mouth daily       metoprolol 100 MG 24 hr tablet    TOPROL-XL    90 tablet    Take 1 tablet (100 mg) by mouth daily       order for DME     1 Units    Equipment being ordered: home blood pressure monitoring device       order for DME     1 Device    Equipment being ordered: knee brace,         "

## 2017-05-02 NOTE — PROGRESS NOTES
Caty presents for colposcopy  Pap hx:     12/7/11:Pap--LSIL.   12/27/11: [place] WNL. Plan: HPV DNA PCR in 12 months. If +, colposcopy; if negative, back to screening (annual). Whitney Tirado. Reminder placed in epic.   8/14/14:Pap--NIL, -HPV. Per ASCCP guidelines, repeat Pap + HPV cotesting in 3 years (2017).  3/27/17 NIL /+ HR HPV (not 16 or 18). Plan: colposcopy within 3 months. Due 6/27/17    PMH/PSH/Meds/Allergies reviewed & documented in [facility].    Procedure for colposcopy and biopsy has been explained to the   patient and consent obtained.    PROCEDURE:  Speculum placed in vagina and excellent visualization of cervix achieved, cervix swabbed x 3 with acetic acid solution.    FINDINGS:  Cervix: no visible lesions except small nabothian cyst at the 7 o'clock position; SCJ visualized 360 degrees without lesions, no biopsies taken and endocervical curettage performed.    Vaginal inspection: normal without visible lesions.    Vulvar colposcopy: vulvar colposcopy not performed.    Procedure Summary: Patient tolerated procedure well and colposcopy adequate.    Colposcopic Impression: benign    Plan: Specimens labelled and sent to pathology. and Will base further treatment on pathology findings..    Anahy Ruiz M.D.

## 2017-05-04 LAB — COPATH REPORT: NORMAL

## 2017-05-11 ENCOUNTER — TELEPHONE (OUTPATIENT)
Dept: FAMILY MEDICINE | Facility: CLINIC | Age: 57
End: 2017-05-11

## 2017-05-11 NOTE — TELEPHONE ENCOUNTER
5/11/2017    Call Regarding Preventive Health Screening Mammogram    Attempt 1    Message on voicemail     Comments:         Outreach   JORDAN

## 2017-05-17 DIAGNOSIS — I10 HYPERTENSION GOAL BP (BLOOD PRESSURE) < 140/90: ICD-10-CM

## 2017-05-17 RX ORDER — METOPROLOL SUCCINATE 100 MG/1
100 TABLET, EXTENDED RELEASE ORAL DAILY
Qty: 90 TABLET | Refills: 3 | Status: SHIPPED | OUTPATIENT
Start: 2017-05-17 | End: 2018-07-17

## 2017-05-17 RX ORDER — AMLODIPINE BESYLATE 10 MG/1
10 TABLET ORAL DAILY
Qty: 90 TABLET | Refills: 3 | Status: SHIPPED | OUTPATIENT
Start: 2017-05-17 | End: 2018-07-17

## 2017-05-17 NOTE — TELEPHONE ENCOUNTER
Prescription approved per [facility] Refill Protocol or patient Primary care provider (PCP)  DANELLE Becker RN/Adolfo Laureano

## 2017-05-17 NOTE — TELEPHONE ENCOUNTER
metoprolol (TOPROL-XL) 100 MG 24 hr tablet      Last Written Prescription Date: 3/27/17  Last Fill Quantity: 90, # refills: 3    [place] [facility] or [facility] prescribing provider:  3/27/17   Future Office Visit:        BP Readings from Last 3 Encounters:   05/02/17 145/71   03/27/17 130/90   11/10/16 (!) 174/112       amLODIPine (NORVASC) 10 MG tablet      Last Written Prescription Date: 3/27/17  Last Fill Quantity: 90, # refills: 3  Last Office Visit [place] [facility] or [facility] prescribing provider: 3/27/17       Potassium   Date Value Ref Range Status   03/27/2017 3.7 3.4 - 5.3 mmol/L Final     Creatinine   Date Value Ref Range Status   03/27/2017 0.70 0.52 - 1.04 mg/dL Final     BP Readings from Last 3 Encounters:   05/02/17 145/71   03/27/17 130/90   11/10/16 (!) 174/112

## 2017-05-18 NOTE — TELEPHONE ENCOUNTER
5/18/2017    Call Regarding Preventive Health Screening Mammogram    Attempt 2    Message on voicemail     Comments:       Outreach   [email or web address]

## 2017-08-28 ENCOUNTER — TELEPHONE (OUTPATIENT)
Dept: OBGYN | Facility: CLINIC | Age: 57
End: 2017-08-28

## 2017-08-28 NOTE — TELEPHONE ENCOUNTER
Panel Management Review            Composite cancer screening  Chart review shows that this patient is due/due soon for the following Mammogram  Summary:    Patient is due/failing the following:   MAMMOGRAM    Action needed:   Patient needs office visit for mammogram.    Type of outreach:    Sent letter.    Questions for provider review:    None                                                                                                                                    Nadia Barillas CMA

## 2017-08-28 NOTE — LETTER
August 28, 2017      Caty Llamas  [address]  [place] [address]    Dear ,      This letter is to remind you that you are due for your follow up Mammogram.    Please call [phone] to schedule your appointment at your earliest convenience.     If you have completed the tests outside of [place], please have the results forwarded to our office. We will update the chart for your primary Physician to review before your next annual physical.     Sincerely,      Anahy Ruiz MD

## 2017-12-05 ENCOUNTER — OFFICE VISIT (OUTPATIENT)
Dept: URGENT CARE | Facility: URGENT CARE | Age: 57
End: 2017-12-05
Payer: COMMERCIAL

## 2017-12-05 ENCOUNTER — RADIANT APPOINTMENT (OUTPATIENT)
Dept: GENERAL RADIOLOGY | Facility: CLINIC | Age: 57
End: 2017-12-05
Attending: PHYSICIAN ASSISTANT
Payer: COMMERCIAL

## 2017-12-05 DIAGNOSIS — S49.92XA SHOULDER INJURY, LEFT, INITIAL ENCOUNTER: ICD-10-CM

## 2017-12-05 DIAGNOSIS — S49.92XA SHOULDER INJURY, LEFT, INITIAL ENCOUNTER: Primary | ICD-10-CM

## 2017-12-05 PROCEDURE — 73000 X-RAY EXAM OF COLLAR BONE: CPT | Mod: LT

## 2017-12-05 PROCEDURE — 73030 X-RAY EXAM OF SHOULDER: CPT | Mod: LT

## 2017-12-05 PROCEDURE — 99213 OFFICE O/P EST LOW 20 MIN: CPT | Performed by: PHYSICIAN ASSISTANT

## 2017-12-05 RX ORDER — CYCLOBENZAPRINE HCL 10 MG
10 TABLET ORAL
Qty: 20 TABLET | Refills: 0 | Status: SHIPPED | OUTPATIENT
Start: 2017-12-05 | End: 2018-07-16

## 2017-12-05 NOTE — MR AVS SNAPSHOT
After Visit Summary   12/5/2017    Caty Llamas    MRN: [id number]           Patient Information     Date Of Birth          1960        Visit Information        Provider Department      12/5/2017 8:40 PM Analia Brooke PA-C [facility]        Today's Diagnoses     Shoulder injury, left, initial encounter    -  1       Follow-ups after your visit        Additional Services     ORTHOPEDICS ADULT REFERRAL       Your provider has referred you to: [facility]: [facility] [phone]   [email or web address]    Please be aware that coverage of these services is subject to the terms and limitations of your health insurance plan.  Call member services at your health plan with any benefit or coverage questions.      Please bring the following to your appointment:    >>   Any x-rays, CTs or MRIs which have been performed.  Contact the facility where they were done to arrange for  prior to your scheduled appointment.  Any new CT, MRI or other procedures ordered by your specialist must be performed at a [facility] facility or coordinated by your clinic's referral office.    >>   List of current medications   >>   This referral request   >>   Any documents/labs given to you for this referral                  Who to contact     If you have questions or need follow up information about today's clinic visit or your schedule please contact [facility] directly at [phone].  Normal or non-critical lab and imaging results will be communicated to you by MyChart, letter or phone within 4 business days after the clinic has received the results. If you do not hear from us within 7 days, please contact the clinic through MyChart or phone. If you have a critical or abnormal lab result, we will notify you by phone as soon as possible.  Submit refill requests through E-Duction or call your pharmacy and they will forward the refill request to us.  "Please allow 3 business days for your refill to be completed.          Additional Information About Your Visit        MyChart Information     NextIOhart lets you send messages to your doctor, view your test results, renew your prescriptions, schedule appointments and more. To sign up, go to [email or web address] . Click on \"Log in\" on the left side of the screen, which will take you to the Welcome page. Then click on \"Sign up Now\" on the right side of the page.     You will be asked to enter the access code listed below, as well as some personal information. Please follow the directions to create your username and password.     Your access code is: [email or web address]  Expires: 3/5/2018 10:27 PM     Your access code will  in 90 days. If you need help or a new code, please call your [facility] clinic or [phone].        Care EveryWhere ID     This is your Care EveryWhere ID. This could be used by other organizations to access your [facility] medical records  [id number]         Blood Pressure from Last 3 Encounters:   17 145/71   17 130/90   11/10/16 (!) 174/112    Weight from Last 3 Encounters:   17 222 lb (100.7 kg)   17 222 lb 12.8 oz (101.1 kg)   16 217 lb (98.4 kg)              We Performed the Following     ORTHOPEDICS ADULT REFERRAL          Today's Medication Changes          These changes are accurate as of: 17 10:27 PM.  If you have any questions, ask your nurse or doctor.               Start taking these medicines.        Dose/Directions    cyclobenzaprine 10 MG tablet   Commonly known as:  FLEXERIL   Used for:  Shoulder injury, left, initial encounter   Started by:  Analia Brooke PA-C        Dose:  10 mg   Take 1 tablet (10 mg) by mouth nightly as needed for muscle spasms   Quantity:  20 tablet   Refills:  0            Where to get your medicines      These medications were sent to [facility]/pharmacy #[id number] - [place], [place] - [address]  [address] [facility], , [facility] [address]     Phone:  [phone]     cyclobenzaprine 10 MG tablet                Primary Care Provider Office Phone # Fax #    CassASCENCION Goddard [phone] [address] [place] [address]        Equal Access to Services     [facility]: Hadii aad ku hadasho Soomaali, waaxda luqadaha, qaybta kaalmada adeegyada, waxay idiin hayaan adeeg kharash la'aan . So [facility] [phone].    ATENCIÓN: Si habla español, tiene a olivas disposición servicios gratuitos de asistencia lingüística. Llame al [phone].    We comply with applicable federal civil rights laws and [place] laws. We do not discriminate on the basis of race, color, national origin, age, disability, sex, sexual orientation, or gender identity.            Thank you!     Thank you for choosing [facility]  for your care. Our goal is always to provide you with excellent care. Hearing back from our patients is one way we can continue to improve our services. Please take a few minutes to complete the written survey that you may receive in the mail after your visit with us. Thank you!             Your Updated Medication List - Protect others around you: Learn how to safely use, store and throw away your medicines at www.disposemymeds.org.          This list is accurate as of: 12/5/17 10:27 PM.  Always use your most recent med list.                   Brand Name Dispense Instructions for use Diagnosis    amLODIPine 10 MG tablet    NORVASC    90 tablet    Take 1 tablet (10 mg) by mouth daily    Hypertension goal BP (blood pressure) < 140/90       cyclobenzaprine 10 MG tablet    FLEXERIL    20 tablet    Take 1 tablet (10 mg) by mouth nightly as needed for muscle spasms    Shoulder injury, left, initial encounter       metoprolol 100 MG 24 hr tablet    TOPROL-XL    90 tablet    Take 1 tablet (100 mg) by mouth daily    Hypertension goal BP (blood pressure) < 140/90        order for DME     1 Units    Equipment being ordered: home blood pressure monitoring device    Hypertension goal BP (blood pressure) < 140/90       order for DME     1 Device    Equipment being ordered: knee brace,    Chronic pain of left knee

## 2017-12-06 NOTE — NURSING NOTE
"Chief Complaint   Patient presents with     Musculoskeletal Problem       Initial There were no vitals taken for this visit. Estimated body mass index is 40.6 kg/(m^2) as calculated from the following:    Height as of 5/2/17: 5' 2\" (1.575 m).    Weight as of 5/2/17: 222 lb (100.7 kg).  Medication Reconciliation: complete     Connie Alvarez MA      "

## 2017-12-11 ENCOUNTER — OFFICE VISIT (OUTPATIENT)
Dept: ORTHOPEDICS | Facility: CLINIC | Age: 57
End: 2017-12-11
Payer: COMMERCIAL

## 2017-12-11 VITALS
WEIGHT: 228.8 LBS | HEART RATE: 75 BPM | HEIGHT: 62 IN | SYSTOLIC BLOOD PRESSURE: 167 MMHG | OXYGEN SATURATION: 96 % | RESPIRATION RATE: 16 BRPM | DIASTOLIC BLOOD PRESSURE: 83 MMHG | BODY MASS INDEX: 42.1 KG/M2

## 2017-12-11 DIAGNOSIS — M19.019 ACROMIOCLAVICULAR JOINT ARTHRITIS: ICD-10-CM

## 2017-12-11 DIAGNOSIS — S43.62XA STERNOCLAVICULAR (JOINT) (LIGAMENT) SPRAIN, LEFT, INITIAL ENCOUNTER: Primary | ICD-10-CM

## 2017-12-11 DIAGNOSIS — M25.512 ACUTE PAIN OF LEFT SHOULDER: ICD-10-CM

## 2017-12-11 PROCEDURE — 99203 OFFICE O/P NEW LOW 30 MIN: CPT | Performed by: ORTHOPAEDIC SURGERY

## 2017-12-11 ASSESSMENT — PAIN SCALES - GENERAL: PAINLEVEL: MILD PAIN (3)

## 2017-12-11 NOTE — PATIENT INSTRUCTIONS
Please remember to call and schedule a follow up appointment if one was recommended at your earliest convenience.  Orthopedics CLINIC HOURS TELEPHONE NUMBER   Dr. Dejan Russell  Certified Medical Assistant   Monday & Wednesday   8am - 5pm  Thursday 1pm - 5pm  Friday 8am -11:30am Specialty schedulers:   [phone] to schedule your surgery.  Main Clinic:   [phone] to make an appointment with any provider.    Urgent Care locations:    [facility] Monday-Friday Closed  Saturday-Sunday 9am-5pm      Monday-Friday 12pm - 8pm  Saturday-Sunday 9am-5pm [phone]     If SURGERY has been recommended, please call our Specialty Schedulers at [phone] to schedule your procedure.    If you need a medication refill, please contact your pharmacy. Please allow 3 business days for your refill to be completed.    If an MRI or CT scan has been recommended, please call [facility] Schedulers at [phone] to schedule your appointment.  Use [employer] (secure e-mail communication and access to your chart) to send a message or to make an appointment. Please ask how you can sign up for [employer].  Your care team's suggested websites for health information:   [email or web address] : Up to date and easily searchable information on multiple topics.   [email or web address] : [place] dept of heat, public health issues in [place], N1N1

## 2017-12-11 NOTE — MR AVS SNAPSHOT
After Visit Summary   12/11/2017    Caty Llamas    MRN: [id number]           Patient Information     Date Of Birth          1960        Visit Information        Provider Department      12/11/2017 1:15 PM Thomas Wylie MD [facility]        Today's Diagnoses     Sternoclavicular (joint) (ligament) sprain, left, initial encounter    -  1    Acromioclavicular joint arthritis        Acute pain of left shoulder          Care Instructions    Please remember to call and schedule a follow up appointment if one was recommended at your earliest convenience.  Orthopedics CLINIC HOURS TELEPHONE NUMBER   Dr. Dejan Russell  Certified Medical Assistant   Monday & Wednesday   8am - 5pm  Thursday 1pm - 5pm  Friday 8am -11:30am Specialty schedulers:   [phone] to schedule your surgery.  Main Clinic:   [phone] to make an appointment with any provider.    Urgent Care locations:    [facility] Monday-Friday Closed  Saturday-Sunday 9am-5pm      Monday-Friday 12pm - 8pm  Saturday-Sunday 9am-5pm [phone]     If SURGERY has been recommended, please call our Specialty Schedulers at [phone] to schedule your procedure.    If you need a medication refill, please contact your pharmacy. Please allow 3 business days for your refill to be completed.    If an MRI or CT scan has been recommended, please call [facility] Schedulers at [phone] to schedule your appointment.  Use Vinobo (secure e-mail communication and access to your chart) to send a message or to make an appointment. Please ask how you can sign up for Vinobo.  Your care team's suggested websites for health information:   [email or web address] : Up to date and easily searchable information on multiple topics.   [email or web address].[place].[email or web address] : [place] dept [place] heat[place], public health issues in [place], N1N1              Follow-ups after your visit         Additional Services     [facility] PT, HAND, AND CHIROPRACTIC REFERRAL       **This order will print in the [facility] Scheduling Office**    Physical Therapy, Hand Therapy and Chiropractic Care are available through:    *[facility]  *[facility]  *[facility]    Call one number to schedule at any of the above locations: [phone].    Your provider has referred you to: Physical Therapy at [facility] or [facility]    Indication/Reason for Referral: left shoulder pain; Left SC joint sprain  Onset of Illness: 11/27/17  Therapy Orders: Evaluate and Treat  Special Programs: None  Special Request: None    Yoon Nettles      Additional Comments for the Therapist or Chiropractor: consider U/S    Please be aware that coverage of these services is subject to the terms and limitations of your health insurance plan.  Call member services at your health plan with any benefit or coverage questions.      Please bring the following to your appointment:    *Your personal calendar for scheduling future appointments  *Comfortable clothing                  Follow-up notes from your care team     Return if symptoms worsen or fail to improve.      Who to contact     If you have questions or need follow up information about today's clinic visit or your schedule please contact [facility] ORTHOPEDIC [facility] directly at [phone].  Normal or non-critical lab and imaging results will be communicated to you by MyChart, letter or phone within 4 business days after the clinic has received the results. If you do not hear from us within 7 days, please contact the clinic through MyChart or phone. If you have a critical or abnormal lab result, we will notify you by phone as soon as possible.  Submit refill requests through Zextit or call your pharmacy and they will forward the refill request to us. Please allow 3 business days for your refill to be completed.          Additional Information About  "Your Visit        Indyarockshart Information     Beintoo lets you send messages to your doctor, view your test results, renew your prescriptions, schedule appointments and more. To sign up, go to [email or web address] . Click on \"Log in\" on the left side of the screen, which will take you to the Welcome page. Then click on \"Sign up Now\" on the right side of the page.     You will be asked to enter the access code listed below, as well as some personal information. Please follow the directions to create your username and password.     Your access code is: [email or web address]  Expires: 3/5/2018 10:27 PM     Your access code will  in 90 days. If you need help or a new code, please call your [facility] clinic or [phone].        Care EveryWhere ID     This is your Care EveryWhere ID. This could be used by other organizations to access your [facility] medical records  [id number]        Your Vitals Were     Pulse Respirations Height Pulse Oximetry BMI (Body Mass Index)       75 16 5' 2\" (1.575 m) 96% 41.85 kg/m2        Blood Pressure from Last 3 Encounters:   17 167/83   17 145/71   17 130/90    Weight from Last 3 Encounters:   17 228 lb 12.8 oz (103.8 kg)   17 222 lb (100.7 kg)   17 222 lb 12.8 oz (101.1 kg)              We Performed the Following     [facility] PT, HAND, AND CHIROPRACTIC REFERRAL        Primary Care Provider Office Phone # Fax #    Cass Langley, APRN [phone] [address] [place] [address]        Equal Access to Services     HELIO RODRÍGUEZ : Alexandro Brown, waroyalda jenny, qafrancine kaalmada jarredda, jonathan milligan. So [facility] [phone].    ATENCIÓN: Si habla español, tiene a olivas disposición servicios gratuitos de asistencia lingüística. Llame al [phone].    We comply with applicable federal civil rights laws and [place] laws. We do not discriminate on the basis of race, color, national " origin, age, disability, sex, sexual orientation, or gender identity.            Thank you!     Thank you for choosing [facility]  for your care. Our goal is always to provide you with excellent care. Hearing back from our patients is one way we can continue to improve our services. Please take a few minutes to complete the written survey that you may receive in the mail after your visit with us. Thank you!             Your Updated Medication List - Protect others around you: Learn how to safely use, store and throw away your medicines at www.disposemymeds.org.          This list is accurate as of: 12/11/17  1:53 PM.  Always use your most recent med list.                   Brand Name Dispense Instructions for use Diagnosis    ALEVE PO      Take 220 mg by mouth        amLODIPine 10 MG tablet    NORVASC    90 tablet    Take 1 tablet (10 mg) by mouth daily    Hypertension goal BP (blood pressure) < 140/90       cyclobenzaprine 10 MG tablet    FLEXERIL    20 tablet    Take 1 tablet (10 mg) by mouth nightly as needed for muscle spasms    Shoulder injury, left, initial encounter       metoprolol 100 MG 24 hr tablet    TOPROL-XL    90 tablet    Take 1 tablet (100 mg) by mouth daily    Hypertension goal BP (blood pressure) < 140/90       order for DME     1 Units    Equipment being ordered: home blood pressure monitoring device    Hypertension goal BP (blood pressure) < 140/90       order for DME     1 Device    Equipment being ordered: knee brace,    Chronic pain of left knee

## 2017-12-11 NOTE — PROGRESS NOTES
CHIEF COMPLAINT:   Chief Complaint   Patient presents with     Shoulder Pain     Left shoulder pain. Onset: 11/27/17. In 1995 patient took a tumble down the stairs and hit a ledge, in 2008 while moving in current home she had pulled her shoulder muscle. About a year ago she turned in her sleep and woke up to a sharp pain. On 11/27/17 she again turned in her sleep, this time the pain was worse. Pain is over the clavicle area. Last few days pain has gone down. She has been using a sling, aleve, hot pack, and asper cream.      Caty Llamas is seen today in the [facility] for evaluation of left shoulder pain at the request of Analia Brooke PA-C    HISTORY:  Caty Llamas is a 57 year old female, right-hand dominant, who is seen for left shoulder pain that started on 11/27/2017, with no known injury. In 1995, she took a tumble down the stairs, hitting her posterior shoulder on the rail of the stairs. In 2008, she was carrying boxes up and down the stairs, pulling her muscle and injuring her arm again. In 2016, she was sleeping, and woke up with a sharp pain in the right shoulder. After a couple of weeks, it went away. On 11/7/2017, she had woken up with pain over the clavicle, similar to pain she had on the right side, posterior shoulder and radiates down the arm and into the wrist at times. Today her pain is mild, rated a 3/10. Pain is located over the anterior shoulder, mid-distal clavicle area. At onset, her pain was excruciating, since then her pain has improved. She cant rest her arm anterolateral of the way down. Will have to keep her shoulder propped up. For treatment, she has tried, icing, taking Aleve, a hot pack, and Aspercreme.     She reports that she has had a right shoulder dislocation in the past, and the left shoulder does feel that way. Recently, she helped a friend move, and feels she must have aggravated her shoulder then.     Had SC joint pain 2/2013 with x-rays at that  time, negative. Improved with rest, sling.      Onset: unknown  Symptoms have been improving since that time.  Aggravated by: with shoulder range of motion  Relieved by: at rest, icing, keeping shoulder propped up  Present symptoms: pain with overhead activities,  pain lifting,  Pain location: anterior shoulder, distal clavicle  Pain severity: 3/10  Pain quality: dull  Frequency of symptoms: frequently  Associated symptoms: neck pain, radiating pain to wrist    Treatment up to this point:ice, Heat, Tylenol, topical treatment, NSAIDS and sling  Has not tried: Corticosteroid injection  Prior history of related problems: neither no prior problems with this area in the past nor previous shoulder injury in past:  See above for history     Significant Orthopedic past medical history: see above for history   Usual level of recreational activity: occasional light sports  Usual level of work activity: Security/Dispatcher    Other PMH:  has a past medical history of CARDIOVASCULAR SCREENING; LDL GOAL LESS THAN 160 (5/9/2010); Cervical high risk HPV (human papillomavirus) test positive (03/27/2017); colposcopy with cervical biopsy (2011); Hypertension goal BP (blood pressure) < 140/90 (12/5/2011); LSIL (low grade squamous intraepithelial lesion) on Pap smear (2011); and Status post colposcopy (05/02/2017).  Patient Active Problem List    Diagnosis Date Noted     Adjustment disorder with anxious mood 11/10/2016     Priority: Medium     Morbid obesity due to excess calories (H) 03/07/2016     Priority: Medium     Shoulder pain, right 02/25/2013     Priority: Medium     Cervical high risk HPV (human papillomavirus) test positive 12/07/2011     Priority: Medium     12/7/11:Pap--LSIL.   12/27/11: [place] WNL. Plan: HPV DNA PCR in 12 months. If +, colposcopy; if negative, back to screening (annual). Whitney Tirado. Reminder placed in epic.   8/14/14:Pap--NIL, -HPV. Per ASCCP guidelines, repeat Pap + HPV cotesting in 3 years  (2017).  3/27/17 NIL /+ HR HPV (not 16 or 18). Plan: colposcopy within 3 months. Due 6/27/17 5/2/17: [facility] - negative. Plan cotest in 1 year.        Hypertension goal BP (blood pressure) < 140/90 12/05/2011     Priority: Medium     CARDIOVASCULAR SCREENING; LDL GOAL LESS THAN 160 05/09/2010     Priority: Medium     Onychomycosis bilat great toenails.  07/21/2008     Priority: Medium     Advanced directives, HC packet sent 3/5/2013 03/05/2013     Priority: Low     Advance Care Planning:   ACP Review and Resources Provided:  Reviewed chart for advance care plan.  Caty Llamas has no plan or code status on file. Sent  available resources and provided with information. Added by Cyndy Montalvo on 3/5/2013               Surgical Hx:  has a past surgical history that includes arthroscopy knee rt/lt (1988, 1994).    Medications:   Current Outpatient Prescriptions:      cyclobenzaprine (FLEXERIL) 10 MG tablet, Take 1 tablet (10 mg) by mouth nightly as needed for muscle spasms, Disp: 20 tablet, Rfl: 0     metoprolol (TOPROL-XL) 100 MG 24 hr tablet, Take 1 tablet (100 mg) by mouth daily, Disp: 90 tablet, Rfl: 3     amLODIPine (NORVASC) 10 MG tablet, Take 1 tablet (10 mg) by mouth daily, Disp: 90 tablet, Rfl: 3     order for DME, Equipment being ordered: knee brace,, Disp: 1 Device, Rfl: 0     ORDER FOR DME, Equipment being ordered: home blood pressure monitoring device, Disp: 1 Units, Rfl: 0    Allergies:   Allergies   Allergen Reactions     Salicylates      ferenol       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 She has never used smokeless tobacco. She reports that she drinks alcohol. She reports that she does not use illicit drugs.    Family Hx: family history includes C.A.D. in her father; CANCER in her father; CEREBROVASCULAR DISEASE in her father; DIABETES in her brother, sister, and sister; Hypertension in her sister and sister..    REVIEW OF SYSTEMS: 10 point ROS neg other than the  "symptoms noted above in the HPI and PMH. Notables include  CONSTITUTIONAL:NEGATIVE for fever, chills, change in weight  INTEGUMENTARY/SKIN: NEGATIVE for worrisome rashes, moles or lesions  MUSCULOSKELETAL:See HPI above  NEURO: NEGATIVE for weakness, dizziness or paresthesias    This document serves as a record of the services and decisions personally performed and made by Thomas Wylie MD. It was created on his behalf by Joaquina Walters, a trained medical scribe. The creation of this document is based the provider's statements to the medical scribe.    Scribe Joaquina Walters 1:02 PM 12/11/2017    PHYSICAL EXAM:  /83  Pulse 75  Resp 16  Ht 1.575 m (5' 2\")  Wt 103.8 kg (228 lb 12.8 oz)  SpO2 96%  BMI 41.85 kg/m2   GENERAL APPEARANCE: healthy, alert, no distress  SKIN: no suspicious lesions or rashes  NEURO: Normal strength and tone, mentation intact and speech normal  PSYCH:  mentation appears normal and affect normal, not anxious  RESPIRATORY: No increased work of breathing.  VASCULAR: Radial pulses 2+ and brisk cappillary refill     MUSCULOSKELETAL:    NECK:  Cervical range of motion: full, painfree, and does not cause shoulder pain or reproduce shoulder pain.  Posterior cervical spine nontender to palpation over midline bony prominences  There is mild tenderness to palpation along neck paraspinals and trapezius muscles  No palpable cervical lymphadenopathy.      RIGHT UPPER EXTREMITY:  Sensation intact to light touch in median, radial, ulnar and axillary nerve distributions  Palpable 2+ radial pulse, brisk capillary refill to all fingers, wwp  Intact epl fpl fdp edc wrist flexion/extension biceps triceps deltoid    RIGHT SHOULDER:  Shoulder Inspection: no swelling, bruising, discoloration, or obvious deformity or asymmetry  Tender: nontender to palpation   Range of Motion:   Active: forward flexion 170 degrees, external rotation 60 degrees, internal rotation couple levels below bra line  Strength: forward " flexion 5/5, External rotation 5/5  Impingement: negative   Special tests: Empty can: negative, belly press: negative     LEFT UPPER EXTREMITY:  Sensation intact to light touch in median, radial, ulnar and axillary nerve distributions  Palpable 2+ radial pulse, brisk capillary refill to all fingers, wwp  Intact epl fpl fdp edc wrist flexion/extension biceps triceps deltoid    LEFT SHOULDER:  Shoulder Inspection: no swelling, bruising, discoloration, or obvious deformity or asymmetry  Tender: Strap muscles of the neck, paraspinals, SC joint, medial clavicle  Nontender to palpation: acromio-clavicular joint, greater tuberosity, anterior shoulder, proximal biceps, pectoralis.  Range of Motion:    Active: forward flexion 160 degrees, external rotation 60 degrees, internal rotation couple levels below bra line; palpable crepitus at AC joint with shoulder range of motion.  Strength: forward flexion 5/5, External rotation 5/5  Impingement: Vargas: 0 and XB: 2  Special tests: Empty can: negative, belly press: negative       X-RAY INTERPRETATION: 3 views left  Shoulder, 2 views left clavicle obtained 12/05/2017 were reviewed personally in clinic today with the patient. On my review, mild acromio-clavicular degenerative changes.    ASSESSMENT: Caty Llamas is a 57 year old female, right-hand dominant with acute left shoulder / clavicle pain, SC joint sprain.    PLAN:   * Reviewed imaging studies with patient. Also, clinical exam findings. Consistent SC joint sprain. Most focal tender to palpation at the SC joint and medial clavicle.  * likely related to recent activities with working out, helping a friend move, etc.    Treatment:    * Rest  * Activity modification - avoid activities that aggravate symptoms or started symptoms at onset.  * NSAIDS - regular use for inflammation, with food, as long as no contra-indications. Please discuss with pcp if needed.  * Ice twice daily to three times daily, 15-20 minutes at a time  *  heat may be beneficial prior to exercising  * Physical Therapy for strengthening, stretching and range of motion exercises of rotator cuff and periscapular stabilization. Consider u/s treatment at SC joint if able.  * Tylenol as needed for pain    * Return to clinic as needed  * consider MRI in future as needed.    The information in this document, created by a scribe for me, accurately reflects the services I personally performed and the decisions made by me. I have reviewed and approved this document for accuracy.     Thomas Wylie M.D., M.S.  Dept. of Orthopaedic Surgery  [facility]

## 2017-12-11 NOTE — NURSING NOTE
"Chief Complaint   Patient presents with     Shoulder Pain     Left shoulder pain. Onset: 11/27/17. In 1995 patient took a tumble down the stairs and hit a ledge, in 2008 while moving in current home she had pulled her shoulder muscle. About a year ago she turned in her sleep and woke up to a sharp pain. On 11/27/17 she again turned in her sleep, this time the pain was worse. Pain is over the clavicle area. Last few days pain has gone down. She has been using a sling, aleve, hot pack, and asper cream.        Initial /83  Pulse 75  Resp 16  Ht 1.575 m (5' 2\")  Wt 103.8 kg (228 lb 12.8 oz)  SpO2 96%  BMI 41.85 kg/m2 Estimated body mass index is 41.85 kg/(m^2) as calculated from the following:    Height as of this encounter: 1.575 m (5' 2\").    Weight as of this encounter: 103.8 kg (228 lb 12.8 oz).  Medication Reconciliation: moy Kingston Certified Medical Assistant    "

## 2017-12-11 NOTE — LETTER
12/11/2017         RE: Caty Llamas  [address]  [place] [address]        Dear Colleague,    Thank you for referring your patient, Caty Llamas, to the [facility] [place]. Please see a copy of my visit note below.    CHIEF COMPLAINT:   Chief Complaint   Patient presents with     Shoulder Pain     Left shoulder pain. Onset: 11/27/17. In 1995 patient took a tumble down the stairs and hit a ledge, in 2008 while moving in current home she had pulled her shoulder muscle. About a year ago she turned in her sleep and woke up to a sharp pain. On 11/27/17 she again turned in her sleep, this time the pain was worse. Pain is over the clavicle area. Last few days pain has gone down. She has been using a sling, aleve, hot pack, and asper cream.      Caty Llamas is seen today in the [facility] for evaluation of left shoulder pain at the request of Analia Brooke PA-C    HISTORY:  Caty Llamas is a 57 year old female, right-hand dominant, who is seen for left shoulder pain that started on 11/27/2017, with no known injury. In 1995, she took a tumble down the stairs, hitting her posterior shoulder on the rail of the stairs. In 2008, she was carrying boxes up and down the stairs, pulling her muscle and injuring her arm again. In 2016, she was sleeping, and woke up with a sharp pain in the right shoulder. After a couple of weeks, it went away. On 11/7/2017, she had woken up with pain over the clavicle, similar to pain she had on the right side, posterior shoulder and radiates down the arm and into the wrist at times. Today her pain is mild, rated a 3/10. Pain is located over the anterior shoulder, mid-distal clavicle area. At onset, her pain was excruciating, since then her pain has improved. She cant rest her arm anterolateral of the way down. Will have to keep her shoulder propped up. For treatment, she has tried, icing, taking Aleve, a hot pack, and  Aspercreme.     She reports that she has had a right shoulder dislocation in the past, and the left shoulder does feel that way. Recently, she helped a friend move, and feels she must have aggravated her shoulder then.     Had SC joint pain 2/2013 with x-rays at that time, negative. Improved with rest, sling.      Onset: unknown  Symptoms have been improving since that time.  Aggravated by: with shoulder range of motion  Relieved by: at rest, icing, keeping shoulder propped up  Present symptoms: pain with overhead activities,  pain lifting,  Pain location: anterior shoulder, distal clavicle  Pain severity: 3/10  Pain quality: dull  Frequency of symptoms: frequently  Associated symptoms: neck pain, radiating pain to wrist    Treatment up to this point:ice, Heat, Tylenol, topical treatment, NSAIDS and sling  Has not tried: Corticosteroid injection  Prior history of related problems: neither no prior problems with this area in the past nor previous shoulder injury in past:  See above for history     Significant Orthopedic past medical history: see above for history   Usual level of recreational activity: occasional light sports  Usual level of work activity: Security/Dispatcher    Other PMH:  has a past medical history of CARDIOVASCULAR SCREENING; LDL GOAL LESS THAN 160 (5/9/2010); Cervical high risk HPV (human papillomavirus) test positive (03/27/2017); colposcopy with cervical biopsy (2011); Hypertension goal BP (blood pressure) < 140/90 (12/5/2011); LSIL (low grade squamous intraepithelial lesion) on Pap smear (2011); and Status post colposcopy (05/02/2017).  Patient Active Problem List    Diagnosis Date Noted     Adjustment disorder with anxious mood 11/10/2016     Priority: Medium     Morbid obesity due to excess calories (H) 03/07/2016     Priority: Medium     Shoulder pain, right 02/25/2013     Priority: Medium     Cervical high risk HPV (human papillomavirus) test positive 12/07/2011     Priority: Medium      12/7/11:Pap--LSIL.   12/27/11: [place] WNL. Plan: HPV DNA PCR in 12 months. If +, colposcopy; if negative, back to screening (annual). Whitney Tirado. Reminder placed in epic.   8/14/14:Pap--NIL, -HPV. Per ASCCP guidelines, repeat Pap + HPV cotesting in 3 years (2017).  3/27/17 NIL /+ HR HPV (not 16 or 18). Plan: colposcopy within 3 months. Due 6/27/17 5/2/17: [place] [facility] - negative. Plan cotest in 1 year.        Hypertension goal BP (blood pressure) < 140/90 12/05/2011     Priority: Medium     CARDIOVASCULAR SCREENING; LDL GOAL LESS THAN 160 05/09/2010     Priority: Medium     Onychomycosis bilat great toenails.  07/21/2008     Priority: Medium     Advanced directives, HC packet sent 3/5/2013 03/05/2013     Priority: Low     Advance Care Planning:   ACP Review and Resources Provided:  Reviewed chart for advance care plan.  Caty Farhad has no plan or code status on file. Sent  available resources and provided with information. Added by Cyndy Montalvo on 3/5/2013               Surgical Hx:  has a past surgical history that includes arthroscopy knee rt/lt (1988, 1994).    Medications:   Current Outpatient Prescriptions:      cyclobenzaprine (FLEXERIL) 10 MG tablet, Take 1 tablet (10 mg) by mouth nightly as needed for muscle spasms, Disp: 20 tablet, Rfl: 0     metoprolol (TOPROL-XL) 100 MG 24 hr tablet, Take 1 tablet (100 mg) by mouth daily, Disp: 90 tablet, Rfl: 3     amLODIPine (NORVASC) 10 MG tablet, Take 1 tablet (10 mg) by mouth daily, Disp: 90 tablet, Rfl: 3     order for DME, Equipment being ordered: knee brace,, Disp: 1 Device, Rfl: 0     ORDER FOR DME, Equipment being ordered: home blood pressure monitoring device, Disp: 1 Units, Rfl: 0    Allergies:   Allergies   Allergen Reactions     Salicylates      ferenol       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 She has never used smokeless tobacco. She reports that she drinks alcohol. She reports that she does not use illicit  "drugs.    Family Hx: family history includes C.A.D. in her father; CANCER in her father; CEREBROVASCULAR DISEASE in her father; DIABETES in her brother, sister, and sister; Hypertension in her sister and sister..    REVIEW OF SYSTEMS: 10 point ROS neg other than the symptoms noted above in the HPI and PMH. Notables include  CONSTITUTIONAL:NEGATIVE for fever, chills, change in weight  INTEGUMENTARY/SKIN: NEGATIVE for worrisome rashes, moles or lesions  MUSCULOSKELETAL:See HPI above  NEURO: NEGATIVE for weakness, dizziness or paresthesias    This document serves as a record of the services and decisions personally performed and made by Thomas Wylie MD. It was created on his behalf by Joaquina Walters, a trained medical scribe. The creation of this document is based the provider's statements to the medical scribe.    Scribbobbi Walters 1:02 PM 12/11/2017    PHYSICAL EXAM:  /83  Pulse 75  Resp 16  Ht 1.575 m (5' 2\")  Wt 103.8 kg (228 lb 12.8 oz)  SpO2 96%  BMI 41.85 kg/m2   GENERAL APPEARANCE: healthy, alert, no distress  SKIN: no suspicious lesions or rashes  NEURO: Normal strength and tone, mentation intact and speech normal  PSYCH:  mentation appears normal and affect normal, not anxious  RESPIRATORY: No increased work of breathing.  VASCULAR: Radial pulses 2+ and brisk cappillary refill     MUSCULOSKELETAL:    NECK:  Cervical range of motion: full, painfree, and does not cause shoulder pain or reproduce shoulder pain.  Posterior cervical spine nontender to palpation over midline bony prominences  There is mild tenderness to palpation along neck paraspinals and trapezius muscles  No palpable cervical lymphadenopathy.      RIGHT UPPER EXTREMITY:  Sensation intact to light touch in median, radial, ulnar and axillary nerve distributions  Palpable 2+ radial pulse, brisk capillary refill to all fingers, wwp  Intact epl fpl fdp edc wrist flexion/extension biceps triceps deltoid    RIGHT SHOULDER:  Shoulder " Inspection: no swelling, bruising, discoloration, or obvious deformity or asymmetry  Tender: nontender to palpation   Range of Motion:   Active: forward flexion 170 degrees, external rotation 60 degrees, internal rotation couple levels below bra line  Strength: forward flexion 5/5, External rotation 5/5  Impingement: negative   Special tests: Empty can: negative, belly press: negative     LEFT UPPER EXTREMITY:  Sensation intact to light touch in median, radial, ulnar and axillary nerve distributions  Palpable 2+ radial pulse, brisk capillary refill to all fingers, wwp  Intact epl fpl fdp edc wrist flexion/extension biceps triceps deltoid    LEFT SHOULDER:  Shoulder Inspection: no swelling, bruising, discoloration, or obvious deformity or asymmetry  Tender: Strap muscles of the neck, paraspinals, SC joint, medial clavicle  Nontender to palpation: acromio-clavicular joint, greater tuberosity, anterior shoulder, proximal biceps, pectoralis.  Range of Motion:    Active: forward flexion 160 degrees, external rotation 60 degrees, internal rotation couple levels below bra line; palpable crepitus at AC joint with shoulder range of motion.  Strength: forward flexion 5/5, External rotation 5/5  Impingement: Vargas: 0 and XB: 2  Special tests: Empty can: negative, belly press: negative       X-RAY INTERPRETATION: 3 views left  Shoulder, 2 views left clavicle obtained 12/05/2017 were reviewed personally in clinic today with the patient. On my review, mild acromio-clavicular degenerative changes.    ASSESSMENT: Caty Llamas is a 57 year old female, right-hand dominant with acute left shoulder / clavicle pain, SC joint sprain.    PLAN:   * Reviewed imaging studies with patient. Also, clinical exam findings. Consistent SC joint sprain. Most focal tender to palpation at the SC joint and medial clavicle.  * likely related to recent activities with working out, helping a friend move, etc.    Treatment:    * Rest  * Activity  modification - avoid activities that aggravate symptoms or started symptoms at onset.  * NSAIDS - regular use for inflammation, with food, as long as no contra-indications. Please discuss with pcp if needed.  * Ice twice daily to three times daily, 15-20 minutes at a time  * heat may be beneficial prior to exercising  * Physical Therapy for strengthening, stretching and range of motion exercises of rotator cuff and periscapular stabilization. Consider u/s treatment at SC joint if able.  * Tylenol as needed for pain    * Return to clinic as needed  * consider MRI in future as needed.    The information in this document, created by a scribe for me, accurately reflects the services I personally performed and the decisions made by me. I have reviewed and approved this document for accuracy.     Thomas Wylie M.D., M.S.  Dept. of Orthopaedic Surgery  [facility]    Again, thank you for allowing me to participate in the care of your patient.        Sincerely,        Thomas Wylie MD

## 2017-12-14 ENCOUNTER — THERAPY VISIT (OUTPATIENT)
Dept: PHYSICAL THERAPY | Facility: CLINIC | Age: 57
End: 2017-12-14
Payer: COMMERCIAL

## 2017-12-14 DIAGNOSIS — M25.512 ACUTE PAIN OF LEFT SHOULDER: Primary | ICD-10-CM

## 2017-12-14 PROCEDURE — 97110 THERAPEUTIC EXERCISES: CPT | Mod: GP | Performed by: PHYSICAL THERAPIST

## 2017-12-14 PROCEDURE — 97161 PT EVAL LOW COMPLEX 20 MIN: CPT | Mod: GP | Performed by: PHYSICAL THERAPIST

## 2017-12-14 PROCEDURE — 97112 NEUROMUSCULAR REEDUCATION: CPT | Mod: GP | Performed by: PHYSICAL THERAPIST

## 2017-12-14 NOTE — PROGRESS NOTES
[facility]  Subjective:    HPI                    Objective:    System    Physical Exam    General     ROS    Assessment/Plan:

## 2017-12-14 NOTE — PROGRESS NOTES
[facility]  Subjective:    HPI                    Objective:    System    Physical Exam    General     ROS    Assessment/Plan:

## 2017-12-14 NOTE — PROGRESS NOTES
[facility] Evaluation  Subjective:    HPI                    Objective:    System    Physical Exam    General     ROS    Assessment/Plan:

## 2017-12-14 NOTE — PROGRESS NOTES
[facility] Evaluation  Subjective:    Patient is a 57 year old female presenting with rehab left ankle/foot hpi.                       Objective:    System    Physical Exam    General     ROS    Assessment/Plan:

## 2017-12-14 NOTE — PROGRESS NOTES
[facility] Evaluation  Subjective:    Patient is a 57 year old female presenting with rehab left shoulder hpi.   Caty Llamas is a 57 year old female with a left shoulder condition.  Condition occurred with:  Other (rolling onto R shoulder).  Condition occurred: at home.  This is a new condition  Pt reports L shoulder pain started on 11/27/17, rolling on to R shoulder, feeling pain into L clavicle. Pt reports having injury to same shoulder in 2008 after repetitive lifting after moving. Pt currently having difficulty with lifting, reaching and pushing. Pt had MD appointment on 12/11/17 and referred to PT. .    Patient reports pain:  Anterior and lateral.  Radiates to:  Upper arm, elbow and cervical.  Pain is described as sharp and aching and is intermittent and reported as 9/10 and 5/10.  Associated symptoms:  Loss of motion/stiffness and loss of strength. Pain is worse during the day.  Symptoms are exacerbated by lifting, using arm overhead and using arm at shoulder level and relieved by bracing/immobilizing, rest, analgesics and heat.  Since onset symptoms are gradually improving.  Special tests:  X-ray.  Previous treatment: none.    General health as reported by patient is good.                  Barriers include:  None as reported by the patient.    Red flags:  None as reported by the patient.                        Objective:    Standing Alignment:      Shoulder/UE:  Rounded shoulders                                       Shoulder Evaluation:  ROM:  AROM:    Flexion:  Left:  135    Right:  160    Abduction:  Left: 135   Right:  160      External Rotation:  Left:  85    Right:  85          Flexion/External Rotation:  Left:  C7    Right:  T3  Extension/Internal Rotation:  Left:  T10    Right:  T7    PROM:    Flexion:  Left:  150          Abduction:  Left:  150                              Strength:    Flexion: Left:4+/5    Pain: +    Right: 5/5     Pain:     Abduction:  Left: 4/5   Pain:+     Right: 5/5     Pain:    Internal Rotation:  Left:5/5     Pain:    Right: 5/5     Pain:  External Rotation:   Left:4+/5      Pain:+   Right:5/5     Pain:        Elbow Flexion:  Left:5/5     Pain:    Right:5/5     Pain:  Elbow Extension:  Left:5/5     Pain:    Right:5/5     Pain:    Special Tests:      Left shoulder negative for the following special tests:  Impingement and Rotator cuff tear    Palpation:    Left shoulder tenderness present at:  Clavicle; Sternoclavicular and Upper Trap                                       General     ROS      Assessment/Plan:      Patient is a 57 year old female with left side shoulder complaints.    Patient has the following significant findings with corresponding treatment plan.                Diagnosis 1:  L shoulder pain, SC joint sprain  Pain -  hot/cold therapy, US, manual therapy, self management, education and home program  Decreased ROM/flexibility - manual therapy, therapeutic exercise, therapeutic activity and home program  Decreased strength - therapeutic exercise, therapeutic activities and home program  Impaired muscle performance - neuro re-education and home program  Decreased function - therapeutic activities and home program  Impaired posture - neuro re-education, therapeutic activities and home program    Therapy Evaluation Codes:   1) History comprised of:   Personal factors that impact the plan of care:      None.    Comorbidity factors that impact the plan of care are:      High blood pressure.     Medications impacting care: High blood pressure.  2) Examination of Body Systems comprised of:   Body structures and functions that impact the plan of care:      Shoulder.   Activity limitations that impact the plan of care are:      Lifting, Sleeping and Laying down.  3) Clinical presentation characteristics are:   Stable/Uncomplicated.  4) Decision-Making    Low complexity using standardized patient assessment instrument and/or measureable assessment of functional  outcome.  Cumulative Therapy Evaluation is: Low complexity.    Previous and current functional limitations:  (See Goal Flow Sheet for this information)    Short term and Long term goals: (See Goal Flow Sheet for this information)     Communication ability:  Patient appears to be able to clearly communicate and understand verbal and written communication and follow directions correctly.  Treatment Explanation - The following has been discussed with the patient:   RX ordered/plan of care  Anticipated outcomes  Possible risks and side effects  This patient would benefit from PT intervention to resume normal activities.   Rehab potential is good.    Frequency:  1 X week, once daily  Duration:  for 8 weeks  Discharge Plan:  Achieve all LTG.  Independent in home treatment program.  Return to previous functional level by discharge.  Reach maximal therapeutic benefit.    Please refer to the daily flowsheet for treatment today, total treatment time and time spent performing 1:1 timed codes.

## 2017-12-14 NOTE — PROGRESS NOTES
[facility] Evaluation  Subjective:    HPI                    Objective:    System    Physical Exam    General     ROS    Assessment/Plan:

## 2017-12-18 ENCOUNTER — THERAPY VISIT (OUTPATIENT)
Dept: PHYSICAL THERAPY | Facility: CLINIC | Age: 57
End: 2017-12-18
Payer: COMMERCIAL

## 2017-12-18 DIAGNOSIS — M25.512 ACUTE PAIN OF LEFT SHOULDER: ICD-10-CM

## 2017-12-18 PROCEDURE — 97110 THERAPEUTIC EXERCISES: CPT | Mod: GP | Performed by: PHYSICAL THERAPIST

## 2017-12-18 PROCEDURE — 97112 NEUROMUSCULAR REEDUCATION: CPT | Mod: GP | Performed by: PHYSICAL THERAPIST

## 2018-02-06 PROBLEM — M25.512 ACUTE PAIN OF LEFT SHOULDER: Status: RESOLVED | Noted: 2017-12-14 | Resolved: 2018-02-06

## 2018-02-06 NOTE — PROGRESS NOTES
Subjective:  HPI                    Objective:  System    Physical Exam    General     ROS    Assessment/Plan:    DISCHARGE REPORT    Progress reporting period is from 12/14/17 to 12/18/17.     SUBJECTIVE  Subjective: Caty reports significant improvement in overall ROM and pain. Has started using L shoulder for reaching and pushing. HEP is going well.    Current Pain level: 1/10   Initial Pain level: 5/10   Changes in function: Yes, see goal flow sheet for change in function   Adverse reactions: None;   ,         OBJECTIVE  Objective: L shoulder AROM: flexion 155 deg, abduction 155 deg, ER 75 deg, EADIR T 7      ASSESSMENT/PLAN  Updated problem list and treatment plan: Diagnosis 1:  L shoulder pain, SC joint sprain  Pain -  hot/cold therapy, manual therapy, self management, education and home program  Decreased ROM/flexibility - manual therapy, therapeutic exercise, therapeutic activity and home program  Decreased strength - therapeutic exercise, therapeutic activities and home program  Impaired muscle performance - neuro re-education and home program  Decreased function - therapeutic activities and home program  Impaired posture - neuro re-education, therapeutic activities and home program  STG/LTGs have been met or progress has been made towards goals:  Yes (See Goal flow sheet completed today.)  Assessment of Progress: The patient's condition is improving.  Self Management Plans:  Patient has been instructed in a home treatment program.  Patient is independent in a home treatment program.  Patient  has been instructed in self management of symptoms.  I have re-evaluated this patient and find that the nature, scope, duration and intensity of the therapy is appropriate for the medical condition of the patient.  Caty continues to require the following intervention to meet STG and LTG's: PT intervention is no longer required to meet STG/LTG.  We will discharge this patient from PT.    Recommendations:  This  patient is ready to be discharged from therapy and continue their home treatment program.    Please refer to the daily flowsheet for treatment today, total treatment time and time spent performing 1:1 timed codes.

## 2018-03-07 ENCOUNTER — OFFICE VISIT (OUTPATIENT)
Dept: FAMILY MEDICINE | Facility: CLINIC | Age: 58
End: 2018-03-07
Payer: COMMERCIAL

## 2018-03-07 VITALS
WEIGHT: 229.4 LBS | TEMPERATURE: 98.8 F | OXYGEN SATURATION: 98 % | BODY MASS INDEX: 42.21 KG/M2 | SYSTOLIC BLOOD PRESSURE: 140 MMHG | HEART RATE: 75 BPM | DIASTOLIC BLOOD PRESSURE: 80 MMHG | HEIGHT: 62 IN

## 2018-03-07 DIAGNOSIS — J01.01 ACUTE RECURRENT MAXILLARY SINUSITIS: Primary | ICD-10-CM

## 2018-03-07 DIAGNOSIS — J20.9 ACUTE BRONCHITIS, UNSPECIFIED ORGANISM: ICD-10-CM

## 2018-03-07 PROCEDURE — 99214 OFFICE O/P EST MOD 30 MIN: CPT | Performed by: NURSE PRACTITIONER

## 2018-03-07 RX ORDER — CODEINE PHOSPHATE AND GUAIFENESIN 10; 100 MG/5ML; MG/5ML
1 SOLUTION ORAL EVERY 4 HOURS PRN
Qty: 240 ML | Refills: 0 | Status: SHIPPED | OUTPATIENT
Start: 2018-03-07 | End: 2018-07-16

## 2018-03-07 RX ORDER — BENZONATATE 200 MG/1
200 CAPSULE ORAL 3 TIMES DAILY PRN
Qty: 21 CAPSULE | Refills: 0 | Status: SHIPPED | OUTPATIENT
Start: 2018-03-07 | End: 2018-07-16

## 2018-03-07 RX ORDER — PREDNISONE 20 MG/1
40 TABLET ORAL DAILY
Qty: 10 TABLET | Refills: 0 | Status: SHIPPED | OUTPATIENT
Start: 2018-03-07 | End: 2018-03-12

## 2018-03-07 NOTE — NURSING NOTE
"Chief Complaint   Patient presents with     URI       Initial /90  Pulse 75  Temp 98.8  F (37.1  C) (Tympanic)  Ht 5' 2\" (1.575 m)  Wt 229 lb 6.4 oz (104.1 kg)  SpO2 98%  Breastfeeding? No  BMI 41.96 kg/m2 Estimated body mass index is 41.96 kg/(m^2) as calculated from the following:    Height as of this encounter: 5' 2\" (1.575 m).    Weight as of this encounter: 229 lb 6.4 oz (104.1 kg).  Medication Reconciliation: complete     Sheila Avila MA      "

## 2018-03-07 NOTE — MR AVS SNAPSHOT
"              After Visit Summary   3/7/2018    Caty Llamas    MRN: [id number]           Patient Information     Date Of Birth          1960        Visit Information        Provider Department      3/7/2018 1:40 PM Cass Langley APRN [facility]        Today's Diagnoses     Acute recurrent maxillary sinusitis    -  1    Acute bronchitis, unspecified organism          Care Instructions    Get stated with  Augmentin  - take with food.      Use the Tessalon pearls. During the day     The Robitussin AC does have some codeine so usually people will take in the evening     For the sore throat use warm tea and honey or even just spoonful of honey and hold it to the back of the throat. This will help to decrease the inflammation and thin the mucous.    Also take the Prednisone - start this tomorrow AM           Follow-ups after your visit        Who to contact     Normal or non-critical lab and imaging results will be communicated to you by Interhyphart, letter or phone within 4 business days after the clinic has received the results. If you do not hear from us within 7 days, please contact the clinic through Interhyphart or phone. If you have a critical or abnormal lab result, we will notify you by phone as soon as possible.  Submit refill requests through Helveta or call your pharmacy and they will forward the refill request to us. Please allow 3 business days for your refill to be completed.          If you need to speak with a  for additional information , please call: [phone]           Additional Information About Your Visit        Helveta Information     Helveta lets you send messages to your doctor, view your test results, renew your prescriptions, schedule appointments and more. To sign up, go to [email or web address] . Click on \"Log in\" on the left side of the screen, which will take you to the Welcome page. Then click on \"Sign up Now\" on the right side of the " "page.     You will be asked to enter the access code listed below, as well as some personal information. Please follow the directions to create your username and password.     Your access code is: [email or web address]  Expires: 2018  2:11 PM     Your access code will  in 90 days. If you need help or a new code, please call your [facility] clinic or [phone].        Care EveryWhere ID     This is your Care EveryWhere ID. This could be used by other organizations to access your [facility] medical records  [id number]        Your Vitals Were     Pulse Temperature Height Pulse Oximetry Breastfeeding? BMI (Body Mass Index)    75 98.8  F (37.1  C) (Tympanic) 5' 2\" (1.575 m) 98% No 41.96 kg/m2       Blood Pressure from Last 3 Encounters:   18 140/80   17 167/83   17 145/71    Weight from Last 3 Encounters:   18 229 lb 6.4 oz (104.1 kg)   17 228 lb 12.8 oz (103.8 kg)   17 222 lb (100.7 kg)              Today, you had the following     No orders found for display         Today's Medication Changes          These changes are accurate as of 3/7/18  2:11 PM.  If you have any questions, ask your nurse or doctor.               Start taking these medicines.        Dose/Directions    amoxicillin-clavulanate 875-125 MG per tablet   Commonly known as:  AUGMENTIN   Used for:  Acute recurrent maxillary sinusitis, Acute bronchitis, unspecified organism   Started by:  Cass Langley APRN CNP        Dose:  1 tablet   Take 1 tablet by mouth 2 times daily Take with food !   Quantity:  20 tablet   Refills:  0       benzonatate 200 MG capsule   Commonly known as:  TESSALON   Used for:  Acute bronchitis, unspecified organism   Started by:  Cass Langley APRN CNP        Dose:  200 mg   Take 1 capsule (200 mg) by mouth 3 times daily as needed for cough   Quantity:  21 capsule   Refills:  0       guaiFENesin-codeine 100-10 MG/5ML Soln solution   Commonly known as:  ROBITUSSIN AC   Used " for:  Acute bronchitis, unspecified organism   Started by:  Cass Langley APRN CNP        Dose:  1 tsp.   Take 5 mLs by mouth every 4 hours as needed for cough   Quantity:  240 mL   Refills:  0       predniSONE 20 MG tablet   Commonly known as:  DELTASONE   Used for:  Acute bronchitis, unspecified organism   Started by:  Cass Langley APRN CNP        Dose:  40 mg   Take 2 tablets (40 mg) by mouth daily for 5 days   Quantity:  10 tablet   Refills:  0            Where to get your medicines      These medications were sent to [facility]/pharmacy #[id number] - [place], [place] - [address],  [address]  [address], , [place] [address]     Phone:  [phone]     amoxicillin-clavulanate 875-125 MG per tablet    benzonatate 200 MG capsule    predniSONE 20 MG tablet         Some of these will need a paper prescription and others can be bought over the counter.  Ask your nurse if you have questions.     Bring a paper prescription for each of these medications     guaiFENesin-codeine 100-10 MG/5ML Soln solution                Primary Care Provider Office Phone # Fax #    ASCENCION Guzman [phone] [address] [place] [address]        Equal Access to Services     KEN RODRÍGUEZ AH: Hadii sarika ku hadasho Soomaali, waaxda luqadaha, qaybta kaalmada adeegyada, waxay idiin hayaan ayana milligan. So [facility] [phone].    ATENCIÓN: Si habla español, tiene a olivas disposición servicios gratpoweros de asistencia lingüística. [facility] [phone].    We comply with applicable federal civil rights laws and [place] laws. We do not discriminate on the basis of race, color, national origin, age, disability, sex, sexual orientation, or gender identity.            Thank you!     Thank you for choosing [facility]  for your care. Our goal is always to provide you with excellent care. Hearing back from our patients is one way we  can continue to improve our services. Please take a few minutes to complete the written survey that you may receive in the mail after your visit with us. Thank you!             Your Updated Medication List - Protect others around you: Learn how to safely use, store and throw away your medicines at www.disposemymeds.org.          This list is accurate as of 3/7/18  2:11 PM.  Always use your most recent med list.                   Brand Name Dispense Instructions for use Diagnosis    ALEVE PO      Take 220 mg by mouth        amLODIPine 10 MG tablet    NORVASC    90 tablet    Take 1 tablet (10 mg) by mouth daily    Hypertension goal BP (blood pressure) < 140/90       amoxicillin-clavulanate 875-125 MG per tablet    AUGMENTIN    20 tablet    Take 1 tablet by mouth 2 times daily Take with food !    Acute recurrent maxillary sinusitis, Acute bronchitis, unspecified organism       benzonatate 200 MG capsule    TESSALON    21 capsule    Take 1 capsule (200 mg) by mouth 3 times daily as needed for cough    Acute bronchitis, unspecified organism       cyclobenzaprine 10 MG tablet    FLEXERIL    20 tablet    Take 1 tablet (10 mg) by mouth nightly as needed for muscle spasms    Shoulder injury, left, initial encounter       guaiFENesin-codeine 100-10 MG/5ML Soln solution    ROBITUSSIN AC    240 mL    Take 5 mLs by mouth every 4 hours as needed for cough    Acute bronchitis, unspecified organism       metoprolol succinate 100 MG 24 hr tablet    TOPROL-XL    90 tablet    Take 1 tablet (100 mg) by mouth daily    Hypertension goal BP (blood pressure) < 140/90       order for DME     1 Units    Equipment being ordered: home blood pressure monitoring device    Hypertension goal BP (blood pressure) < 140/90       order for DME     1 Device    Equipment being ordered: knee brace,    Chronic pain of left knee       predniSONE 20 MG tablet    DELTASONE    10 tablet    Take 2 tablets (40 mg) by mouth daily for 5 days    Acute bronchitis,  unspecified organism

## 2018-03-07 NOTE — PATIENT INSTRUCTIONS
Get stated with  Augmentin  - take with food.      Use the Tessalon pearls. During the day     The Robitussin AC does have some codeine so usually people will take in the evening     For the sore throat use warm tea and honey or even just spoonful of honey and hold it to the back of the throat. This will help to decrease the inflammation and thin the mucous.    Also take the Prednisone - start this tomorrow AM

## 2018-03-07 NOTE — PROGRESS NOTES
SUBJECTIVE:   Caty Llamas is a 58 year old female who presents to clinic today for the following health issues:      ENT Symptoms             Symptoms: cc Present Absent Comment   Fever/Chills   x    Fatigue   x    Muscle Aches   x    Eye Irritation   x    Sneezing   x    Nasal Chad/Drg  x  Nose is stuffy    Sinus Pressure/Pain  x  Does have some maxillary sinus pain    Loss of smell   x    Dental pain   x    Sore Throat   x    Swollen Glands   x    Ear Pain/Fullness   x    Cough  x  Cough is productive .  Was very yucky.  Is chunky , cough is very harsh .     Wheeze  x  Does have some wheezing     Chest Pain   x    Shortness of breath  x  Is feeling short of breath.     Rash   x    Other  x  Has been gaining weight , did start a work out just before getting sick but then had to stop does have  Lower leg  Edema at the end of the day      Symptom duration:  Pt states she has been ill since February 8th   Symptom severity: Was worse but is just not getting better    Treatments tried:  robitussin DM and dayquil and nyquil alkaseltzer.    Contacts:  none             Problem list and histories reviewed & adjusted, as indicated.  Additional history: as documented    Patient Active Problem List   Diagnosis     Onychomycosis bilat great toenails.      CARDIOVASCULAR SCREENING; LDL GOAL LESS THAN 160     Hypertension goal BP (blood pressure) < 140/90     Cervical high risk HPV (human papillomavirus) test positive     Shoulder pain, right     Advanced directives,  packet sent 3/5/2013     Morbid obesity due to excess calories (H)     Adjustment disorder with anxious mood     Past Surgical History:   Procedure Laterality Date     ARTHROSCOPY KNEE RT/LT  1988, 1994    left knee       Social History   Substance Use Topics     Smoking status: Never Smoker     Smokeless tobacco: Never Used      Comment: smoke every blue moon     Alcohol use Yes      Comment: very seldom     Family History   Problem Relation Age of Onset  "    MATTHEWAALEXUS. Father      CEREBROVASCULAR DISEASE Father      CANCER Father      DIABETES Sister      Hypertension Sister      DIABETES Brother      DIABETES Sister      Hypertension Sister          Current Outpatient Prescriptions   Medication Sig Dispense Refill     metoprolol (TOPROL-XL) 100 MG 24 hr tablet Take 1 tablet (100 mg) by mouth daily 90 tablet 3     amLODIPine (NORVASC) 10 MG tablet Take 1 tablet (10 mg) by mouth daily 90 tablet 3     Naproxen Sodium (ALEVE PO) Take 220 mg by mouth       cyclobenzaprine (FLEXERIL) 10 MG tablet Take 1 tablet (10 mg) by mouth nightly as needed for muscle spasms (Patient not taking: Reported on 12/11/2017) 20 tablet 0     order for DME Equipment being ordered: knee brace, (Patient not taking: Reported on 12/11/2017) 1 Device 0     ORDER FOR DME Equipment being ordered: home blood pressure monitoring device (Patient not taking: Reported on 3/7/2018) 1 Units 0     Allergies   Allergen Reactions     Salicylates      ferenol     BP Readings from Last 3 Encounters:   03/07/18 140/80   12/11/17 167/83   05/02/17 145/71    Wt Readings from Last 3 Encounters:   03/07/18 229 lb 6.4 oz (104.1 kg)   12/11/17 228 lb 12.8 oz (103.8 kg)   05/02/17 222 lb (100.7 kg)                    Reviewed and updated as needed this visit by clinical staff  Tobacco  Allergies  Meds  Med Hx  Surg Hx  Fam Hx  Soc Hx      Reviewed and updated as needed this visit by Provider  Tobacco  Allergies  Meds  Med Hx  Surg Hx  Fam Hx  Soc Hx        ROS:  See notes above.        OBJECTIVE:     /80  Pulse 75  Temp 98.8  F (37.1  C) (Tympanic)  Ht 5' 2\" (1.575 m)  Wt 229 lb 6.4 oz (104.1 kg)  SpO2 98%  Breastfeeding? No  BMI 41.96 kg/m2  Body mass index is 41.96 kg/(m^2).   GENERAL: no distress and fatigued  HENT: normal cephalic/atraumatic, right ear: clear effusion, left ear: clear effusion and retracted , nasal mucosa edematous , rhinorrhea clear and yellow, oral mucous membranes moist, " "tonsillar erythema and sinuses: maxillary tenderness on bilateral, right greater than left , maxillary swelling on bilateral, right greater than left  NECK: bilateral anterior cervical adenopathy  RESP: expiratory wheezes bibasilar, inspiratory wheezes bibasilar, bronchial breath sounds , prolonged expiratory phase and does have a very harsh and prolonged cough   CV: regular rate and rhythm, normal S1 S2, no S3 or S4, no murmur, click or rub, no peripheral edema and peripheral pulses strong      Diagnostic Test Results:  none     ASSESSMENT/PLAN:     ASSESSMENT/PLAN:      ICD-10-CM    1. Acute recurrent maxillary sinusitis J01.01 amoxicillin-clavulanate (AUGMENTIN) 875-125 MG per tablet   2. Acute bronchitis, unspecified organism J20.9 benzonatate (TESSALON) 200 MG capsule     guaiFENesin-codeine (ROBITUSSIN AC) 100-10 MG/5ML SOLN solution     amoxicillin-clavulanate (AUGMENTIN) 875-125 MG per tablet     predniSONE (DELTASONE) 20 MG tablet       Patient Instructions   Get stated with  Augmentin  - take with food.      Use the Tessalon pearls. During the day     The Robitussin AC does have some codeine so usually people will take in the evening     For the sore throat use warm tea and honey or even just spoonful of honey and hold it to the back of the throat. This will help to decrease the inflammation and thin the mucous.    Also take the Prednisone - start this tomorrow AM              BMI:   Estimated body mass index is 41.96 kg/(m^2) as calculated from the following:    Height as of this encounter: 5' 2\" (1.575 m).    Weight as of this encounter: 229 lb 6.4 oz (104.1 kg).   Weight management plan: Discussed healthy diet and exercise guidelines and patient will follow up in 6 months in clinic to re-evaluate.      MEDICATIONS:        - Start taking Augmentin   Tessalon pearls  Robitussin AC  Prednisone        - Continue other medications without change  See Patient Instructions    ADELA EMERSON NP, APRN " [facility]

## 2018-04-12 ENCOUNTER — TELEPHONE (OUTPATIENT)
Dept: FAMILY MEDICINE | Facility: CLINIC | Age: 58
End: 2018-04-12

## 2018-04-12 NOTE — TELEPHONE ENCOUNTER
Panel Management Review      Patient has the following on her problem list:     Hypertension   Last three blood pressure readings:  BP Readings from Last 3 Encounters:   03/07/18 140/80   12/11/17 167/83   05/02/17 145/71     Blood pressure: FAILED    HTN Guidelines:  Age 18-59 BP range:  Less than 140/90  Age 60-85 with Diabetes:  Less than 140/90  Age 60-85 without Diabetes:  less than 150/90      Composite cancer screening  Chart review shows that this patient is due/due soon for the following Pap Smear and Mammogram  Summary:    Patient is due/failing the following:   MAMMOGRAM; MICROALBUMIN; PAP/HPV    Action needed:   Patient needs office visit for physical with PAP and LABS and Patient needs referral/order: MAMMOGRAM    Type of outreach:    Sent letter.    Questions for provider review:    None                                                                                                                                    Miryam Holland CMA (AAMA)       Chart routed to None .

## 2018-06-02 ENCOUNTER — HEALTH MAINTENANCE LETTER (OUTPATIENT)
Age: 58
End: 2018-06-02

## 2018-07-16 ENCOUNTER — RADIANT APPOINTMENT (OUTPATIENT)
Dept: GENERAL RADIOLOGY | Facility: CLINIC | Age: 58
End: 2018-07-16
Attending: FAMILY MEDICINE
Payer: COMMERCIAL

## 2018-07-16 ENCOUNTER — OFFICE VISIT (OUTPATIENT)
Dept: FAMILY MEDICINE | Facility: CLINIC | Age: 58
End: 2018-07-16
Payer: COMMERCIAL

## 2018-07-16 VITALS — DIASTOLIC BLOOD PRESSURE: 92 MMHG | SYSTOLIC BLOOD PRESSURE: 158 MMHG

## 2018-07-16 DIAGNOSIS — M23.92 INTERNAL DERANGEMENT OF KNEE, LEFT: ICD-10-CM

## 2018-07-16 DIAGNOSIS — M23.92 INTERNAL DERANGEMENT OF KNEE, LEFT: Primary | ICD-10-CM

## 2018-07-16 PROCEDURE — 99213 OFFICE O/P EST LOW 20 MIN: CPT | Performed by: FAMILY MEDICINE

## 2018-07-16 PROCEDURE — 73560 X-RAY EXAM OF KNEE 1 OR 2: CPT | Mod: LT

## 2018-07-16 ASSESSMENT — PAIN SCALES - GENERAL: PAINLEVEL: EXTREME PAIN (8)

## 2018-07-16 NOTE — PROGRESS NOTES
Patient presents with:  Musculoskeletal Problem: pt c/o left knee pain x 1 day, no recent injury but previous knee problems    Jeanmarie De La Garza MA      HISTORY    Caty is basically complaining of more pain and instability in her left knee.  She feels that it hyperextends.  She is wearing a neoprene brace which has not been too helpful.  She limps when she walks.    She has a remote history of reconstructive surgery on her left knee. This apparently occurred as far back as 1990.  Surgery was done at a [facility].  It sounds like she was over in the [facility].    She has an additional problem of being overweight.    Patient Active Problem List   Diagnosis     Onychomycosis bilat great toenails.      CARDIOVASCULAR SCREENING; LDL GOAL LESS THAN 160     Hypertension goal BP (blood pressure) < 140/90     Cervical high risk HPV (human papillomavirus) test positive     Shoulder pain, right     Advanced directives, HC packet sent 3/5/2013     Morbid obesity due to excess calories (H)     Adjustment disorder with anxious mood     Current Outpatient Prescriptions   Medication Sig Dispense Refill     amLODIPine (NORVASC) 10 MG tablet Take 1 tablet (10 mg) by mouth daily 90 tablet 3     metoprolol (TOPROL-XL) 100 MG 24 hr tablet Take 1 tablet (100 mg) by mouth daily 90 tablet 3     order for DME Equipment being ordered: knee brace, (Patient not taking: Reported on 12/11/2017) 1 Device 0       REVIEW OF SYSTEMS    Unremarkable other than above.      Past Medical History:   Diagnosis Date     CARDIOVASCULAR SCREENING; LDL GOAL LESS THAN 160 5/9/2010     Cervical high risk HPV (human papillomavirus) test positive 03/27/2017    Neg 16/18     Hx of colposcopy with cervical biopsy 2011    wnl     Hypertension goal BP (blood pressure) < 140/90 12/5/2011     LSIL (low grade squamous intraepithelial lesion) on Pap smear 2011    [place] neg     Status post colposcopy 05/02/2017    ECC - negative for dysplasia       EXAM  BP (!)  158/92    She has an antalgic gait.  Range of motion of left knee is about 5 through 90 .  Knee feels fairly stable laterally.  I believe she has a positive drawer sign.      Left knee x-ray was obtained at discharge.  This shows advanced degenerative change.  Certainly present in the medial and anterior compartments.      (M23.92) Internal derangement of knee, left  (primary encounter diagnosis)  Comment:   I doubt that reconstruction will be an option here.  She may need a TKA.  Orthopedic opinion advised.    Plan: ORTHOPEDICS ADULT REFERRAL, XR Knee Standing         Left 2 Views

## 2018-07-16 NOTE — MR AVS SNAPSHOT
After Visit Summary   7/16/2018    Caty Llamas    MRN: [id number]           Patient Information     Date Of Birth          1960        Visit Information        Provider Department      7/16/2018 2:20 PM Jair Daugherty MD [facility]        Today's Diagnoses     Internal derangement of knee, left    -  1      Care Instructions      Obtain Orthopedic consultation.          Follow-ups after your visit        Additional Services     ORTHOPEDICS ADULT REFERRAL       Your provider has referred you to: [facility]: Orthopaedic Clinic [facility] [phone]   [email or web address]      Please be aware that coverage of these services is subject to the terms and limitations of your health insurance plan.  Call member services at your health plan with any benefit or coverage questions.      Please bring the following to your appointment:    >>   Any x-rays, CTs or MRIs which have been performed.  Contact the facility where they were done to arrange for  prior to your scheduled appointment.    >>   List of current medications   >>   This referral request   >>   Any documents/labs given to you for this referral                  Future tests that were ordered for you today     Open Future Orders        Priority Expected Expires Ordered    XR Knee Standing Left 2 Views Routine 7/16/2018 7/16/2019 7/16/2018            Who to contact     If you have questions or need follow up information about today's clinic visit or your schedule please contact [facility] directly at [phone].  Normal or non-critical lab and imaging results will be communicated to you by MyChart, letter or phone within 4 business days after the clinic has received the results. If you do not hear from us within 7 days, please contact the clinic through MyChart or phone. If you have a critical or abnormal lab result, we will notify you by phone as soon as  possible.  Submit refill requests through [employer]! or call your pharmacy and they will forward the refill request to us. Please allow 3 business days for your refill to be completed.          Additional Information About Your Visit        Care EveryWhere ID     This is your Care EveryWhere ID. This could be used by other organizations to access your [facility] medical records  [id number]         Blood Pressure from Last 3 Encounters:   07/16/18 (!) 158/92   03/07/18 140/80   12/11/17 167/83    Weight from Last 3 Encounters:   03/07/18 229 lb 6.4 oz (104.1 kg)   12/11/17 228 lb 12.8 oz (103.8 kg)   05/02/17 222 lb (100.7 kg)              We Performed the Following     ORTHOPEDICS ADULT REFERRAL        Primary Care Provider Office Phone # Fax #    Cass Langley, [facility] [phone]       [address] [place] [address]        Equal Access to Services     [facility] : Hadii aad ku hadasho Soomaali, waaxda luqadaha, qaybta kaalmada adeegyada, waxay idiin hayaan ayana olivares . So [facility] [phone].    ATENCIÓN: Si habla español, tiene a olivas disposición servicios gratuitos de asistencia lingüística. Llame al [phone].    We comply with applicable federal civil rights laws and [place] laws. We do not discriminate on the basis of race, color, national origin, age, disability, sex, sexual orientation, or gender identity.            Thank you!     Thank you for [facility]  [facility]. Our goal is always to provide you with excellent care. Hearing back from our patients is one way we can continue to improve our services. Please take a few minutes to complete the written survey that you may receive in the mail after your visit with us. Thank you!             Your Updated Medication List - Protect others around you: Learn how to safely use, store and throw away your medicines at www.disposemymeds.org.          This list is accurate as of 7/16/18  2:45 PM.   Always use your most recent med list.                   Brand Name Dispense Instructions for use Diagnosis    amLODIPine 10 MG tablet    NORVASC    90 tablet    Take 1 tablet (10 mg) by mouth daily    Hypertension goal BP (blood pressure) < 140/90       metoprolol succinate 100 MG 24 hr tablet    TOPROL-XL    90 tablet    Take 1 tablet (100 mg) by mouth daily    Hypertension goal BP (blood pressure) < 140/90       order for DME     1 Device    Equipment being ordered: knee brace,    Chronic pain of left knee

## 2018-07-17 DIAGNOSIS — I10 HYPERTENSION GOAL BP (BLOOD PRESSURE) < 140/90: ICD-10-CM

## 2018-07-17 RX ORDER — METOPROLOL SUCCINATE 100 MG/1
TABLET, EXTENDED RELEASE ORAL
Qty: 90 TABLET | Refills: 2 | Status: SHIPPED | OUTPATIENT
Start: 2018-07-17 | End: 2019-05-13

## 2018-07-17 RX ORDER — AMLODIPINE BESYLATE 10 MG/1
TABLET ORAL
Qty: 90 TABLET | Refills: 2 | Status: SHIPPED | OUTPATIENT
Start: 2018-07-17 | End: 2019-05-13

## 2018-07-17 NOTE — TELEPHONE ENCOUNTER
Routing refill requests to provider for review/approval because:  Labs not current:  Cr  BPs    BP Readings from Last 6 Encounters:   07/16/18 (!) 158/92   03/07/18 140/80   12/11/17 167/83   05/02/17 145/71   03/27/17 130/90   11/10/16 (!) 174/112     Creatinine   Date Value Ref Range Status   03/27/2017 0.70 0.52 - 1.04 mg/dL Blayne MARTINEZ RN

## 2018-07-17 NOTE — TELEPHONE ENCOUNTER
"Requested Prescriptions   Pending Prescriptions Disp Refills     amLODIPine (NORVASC) 10 MG tablet [Pharmacy Med Name: AmLODIPine Besylate 10 MG Tablet] 90 tablet 2    Last Written Prescription Date:  05/17/2017 #90 x 3  Last filled 07/17/2018  Last office visit: 03/07/2018 STEPHEN Langley   Future Office Visit:  None   Sig: TAKE 1 TABLET BY MOUTH DAILY    Calcium Channel Blockers Protocol  Failed    7/17/2018  9:05 AM       Failed - Blood pressure under 140/90 in past 12 months    BP Readings from Last 3 Encounters:   07/16/18 (!) 158/92   03/07/18 140/80   12/11/17 167/83                Failed - Normal serum creatinine on file in past 12 months    Recent Labs   Lab Test  03/27/17   1145   CR  0.70            Passed - Recent (12 mo) or future (30 days) visit within the authorizing provider's specialty    Patient had office visit in the last 12 months or has a visit in the next 30 days with authorizing provider or within the authorizing provider's specialty.  See \"Patient Info\" tab in inbasket, or \"Choose Columns\" in Meds & Orders section of the refill encounter.           Passed - Patient is age 18 or older       Passed - No active pregnancy on record       Passed - No positive pregnancy test in past 12 months        metoprolol succinate (TOPROL-XL) 100 MG 24 hr tablet [Pharmacy Med Name: Metoprolol Succinate  MG Tablet Extended Release 24 Hour] 90 tablet 2    Last Written Prescription Date:  05/17/2017 #90 x 3  Last filled 07/17/2018  Last office visit: 03/07/2018 STEPHEN Langley   Future Office Visit:  None   Sig: TAKE 1 TABLET BY MOUTH DAILY    Beta-Blockers Protocol Failed    7/17/2018  9:05 AM       Failed - Blood pressure under 140/90 in past 12 months    BP Readings from Last 3 Encounters:   07/16/18 (!) 158/92   03/07/18 140/80   12/11/17 167/83                Passed - Patient is age 6 or older       Passed - Recent (12 mo) or future (30 days) visit within the authorizing provider's specialty    Patient had " "office visit in the last 12 months or has a visit in the next 30 days with authorizing provider or within the authorizing provider's specialty.  See \"Patient Info\" tab in inbasket, or \"Choose Columns\" in Meds & Orders section of the refill encounter.              "

## 2018-07-18 ENCOUNTER — TELEPHONE (OUTPATIENT)
Dept: FAMILY MEDICINE | Facility: CLINIC | Age: 58
End: 2018-07-18

## 2018-07-18 NOTE — TELEPHONE ENCOUNTER
XR Knee Standing Left 2 Views   Status:  Final result   Visible to patient:  No (Not Released) Dx:  Internal derangement of knee, left Order: [id number]       Notes Recorded by Analia Pillai RN on 7/18/2018 at 4:10 PM  Left message on answering machine for patient to call back. Rubina PEARSON -[phone]    ------    Notes Recorded by Jair Daugherty MD on 7/18/2018 at 12:50 PM  Please call her. I tried 2 days ago but no answer.  She has severe arthritis in knee.  Probably needs a knee replacement.  Rec-keep her Ortho appt.  Jair Daugherty MD on 7/18/2018 at 12:49 PM

## 2018-07-19 NOTE — TELEPHONE ENCOUNTER
FUTURE VISIT INFORMATION      FUTURE VISIT INFORMATION:    Date: 7/23/18    Time: 1:15    Location:   REFERRAL INFORMATION:    Referring provider:  Jair Daugherty    Referring providers clinic:  [place]    Reason for visit/diagnosis: Internal derangement of knee, left        RECORDS STATUS      ALL RECORDS AND IMAGING INTERNAL

## 2018-07-19 NOTE — TELEPHONE ENCOUNTER
Patient had returned call.  States on voicemail that she works all day and can only return call during breaks; can leave message.  I called back and gave her Dr. Daugherty's note/instructions as per below and requested she call back when able and let me know received in message.  Rubina Pillai RN

## 2018-07-23 ENCOUNTER — PRE VISIT (OUTPATIENT)
Dept: ORTHOPEDICS | Facility: CLINIC | Age: 58
End: 2018-07-23

## 2018-07-23 ENCOUNTER — OFFICE VISIT (OUTPATIENT)
Dept: ORTHOPEDICS | Facility: CLINIC | Age: 58
End: 2018-07-23
Payer: COMMERCIAL

## 2018-07-23 VITALS — BODY MASS INDEX: 42.14 KG/M2 | WEIGHT: 229 LBS | HEIGHT: 62 IN

## 2018-07-23 DIAGNOSIS — M17.32 POST-TRAUMATIC OSTEOARTHRITIS OF LEFT KNEE: Primary | ICD-10-CM

## 2018-07-23 NOTE — PROGRESS NOTES
"Sports Medicine Clinic Visit    PCP: Cass Langley    Caty Llamas is a 58 year old female who is seen  as self referral presenting with recurrent left knee pain and \"giving out\" of left knee past couple years.     Injury: none      Ht 5' 2\" (1.575 m)  Wt 229 lb (103.9 kg)  BMI 41.88 kg/m2         PMH:  Past Medical History:   Diagnosis Date     CARDIOVASCULAR SCREENING; LDL GOAL LESS THAN 160 5/9/2010     Cervical high risk HPV (human papillomavirus) test positive 03/27/2017    Neg 16/18     Hx of colposcopy with cervical biopsy 2011    wnl     Hypertension goal BP (blood pressure) < 140/90 12/5/2011     LSIL (low grade squamous intraepithelial lesion) on Pap smear 2011    [place] neg     Status post colposcopy 05/02/2017    ECC - negative for dysplasia       Active problem list:  Patient Active Problem List   Diagnosis     Onychomycosis bilat great toenails.      CARDIOVASCULAR SCREENING; LDL GOAL LESS THAN 160     Hypertension goal BP (blood pressure) < 140/90     Cervical high risk HPV (human papillomavirus) test positive     Shoulder pain, right     Advanced directives, HC packet sent 3/5/2013     Morbid obesity due to excess calories (H)     Adjustment disorder with anxious mood       FH:  Family History   Problem Relation Age of Onset     C.A.D. Father      Cerebrovascular Disease Father      Cancer Father      Diabetes Sister      Hypertension Sister      Diabetes Brother      Diabetes Sister      Hypertension Sister        SH:  Social History     Social History     Marital status: Single     Spouse name: N/A     Number of children: N/A     Years of education: N/A     Occupational History     Not on file.     Social History Main Topics     Smoking status: Never Smoker     Smokeless tobacco: Never Used      Comment: smoke every blue moon     Alcohol use Yes      Comment: very seldom     Drug use: No     Sexual activity: Not Currently     Other Topics Concern     Not on file     Social History " Narrative        Dairy/d 3 servings/d.     Caffeine 4-5 servings/d    Exercise 3-4 x week    Sunscreen used - No    Seatbelts used - Yes    Working smoke/CO detectors in the home - Yes    Guns stored in the home - No    Self Breast Exams - Yes    Self Testicular Exam - NA    Eye Exam up to date - No    Dental Exam up to date - No    Pap Smear up to date - No    Mammogram up to date - No    PSA up to date - NA    Dexa Scan up to date - No    Flex Sig / Colonoscopy up to date - No    Immunizations up to date - Yes    Abuse: Current or Past(Physical, Sexual or Emotional)- No    Do you feel safe in your environment - Yes    Tristian Farley CMA                       MEDS:  See EMR, reviewed  ALL:  See EMR, reviewed    REVIEW OF SYSTEMS:  CONSTITUTIONAL:NEGATIVE for fever, chills, change in weight  INTEGUMENTARY/SKIN: NEGATIVE for worrisome rashes, moles or lesions  EYES: NEGATIVE for vision changes or irritation  ENT/MOUTH: NEGATIVE for ear, mouth and throat problems  RESP:NEGATIVE for significant cough or SOB  BREAST: NEGATIVE for masses, tenderness or discharge  CV: NEGATIVE for chest pain, palpitations or peripheral edema  GI: NEGATIVE for nausea, abdominal pain, heartburn, or change in bowel habits  :NEGATIVE for frequency, dysuria, or hematuria  :NEGATIVE for frequency, dysuria, or hematuria  NEURO: NEGATIVE for weakness, dizziness or paresthesias  ENDOCRINE: NEGATIVE for temperature intolerance, skin/hair changes  HEME/ALLERGY/IMMUNE: NEGATIVE for bleeding problems  PSYCHIATRIC: NEGATIVE for changes in mood or affect        Subjective: This athletically active 58-year-old female in the last years had a tendency to notice more consistent left-sided knee discomfort that improves with Aleve and improves with physical activity.  She has been able to lose 15 pounds by starting a walking program, following it proper diet, and is interested in doing some exercise biking and light weight lifting.  In the past she has always  "been involved in sports of all kinds and even skydiving.  She is status post an ACL reconstruction of the left knee in 1991 and subsequently underwent a left knee arthroscopy by Dr. Maxwell with a partial meniscectomy, loose body removal and thermal shrinkage of the ACL in 2006.  She has a tendency to have swelling about the medial knee but no leydi locking episodes.  Sometimes she could not trust the knee and has a sense that will \"give out\" but there has been no specific injury.          Inspection:       No effusion      AP/lateral alignment tends toward bilateral varus; mild t/t medial ljt line      Non-tender: patellar facets, MCL, LCL, lateral joint line, medial joint line, IT band, pes anserine bursa, fibular head    Symmetrical medial and lateral patellar excursion, with no \"winking\" knee cap or obvious patella amita    Active Range of Motion: full extension.  flexion allowed beyond 100 degrees.    Strength: quad  5/5, Hamstrings  5/5, Gastroc  5/5, Tibialis anterior  5/5, Peroneals  5/5     Special tests: normal Valgus stress test, normal Varus, negative Lachman's test, negative pivot shift, negative posterior drawer, no posterolateral corner signs, negative Jeffery's, no apprehension with lateral stress of the patella.    Sensation intact.  Distal pulses intact.  Capillary refill normal.  Skin intact, without signs of cellulitis.    A/o x 3    Assessment: Severe left knee DJD    Plan: We went over her x-rays in detail which show nearly bone-on-bone changes in the medial joint space and also in the patellofemoral space with signs of her previous ACL surgery.  I do not see obvious laxity on her exam today.  She knows she would be a candidate for knee replacement but on the other hand she has improved her situation significantly by her weight loss, her exercise protocol and at this time she only needs to take pain medicines intermittently.  We discussed other conservative options such as cortisone injections " or Synvisc injections.  She does not feel she has enough pain to warrant one currently but she knows it is an option for the future if needed.  She will follow-up if not improved with conservative cares.  She had no further questions.

## 2018-07-23 NOTE — MR AVS SNAPSHOT
"              After Visit Summary   7/23/2018    Caty Llamas    MRN: [id number]           Patient Information     Date Of Birth          1960        Visit Information        Provider Department      7/23/2018 1:15 PM Harinder Billings MD [facility] Sports Medicine         Follow-ups after your visit        Who to contact     Please call your clinic at [phone] to:    Ask questions about your health    Make or cancel appointments    Discuss your medicines    Learn about your test results    Speak to your doctor            Additional Information About Your Visit        Care EveryWhere ID     This is your Care EveryWhere ID. This could be used by other organizations to access your [facility] medical records  [id number]        Your Vitals Were     Height BMI (Body Mass Index)                5' 2\" (1.575 m) 41.88 kg/m2           Blood Pressure from Last 3 Encounters:   07/16/18 (!) 158/92   03/07/18 140/80   12/11/17 167/83    Weight from Last 3 Encounters:   07/23/18 229 lb (103.9 kg)   03/07/18 229 lb 6.4 oz (104.1 kg)   12/11/17 228 lb 12.8 oz (103.8 kg)              Today, you had the following     No orders found for display       Primary Care Provider Office Phone # Fax #    Cass Langley, [facility] [phone] [address] [place] [address]        Equal Access to Services     [facility] AH: Hadii aad kirsten hadasho Soandres, waaxda luqadaha, qaybta kaalmada jarredda, jonathan milligan. So [facility] [phone].    ATENCIÓN: Si habla español, tiene a olivas disposición servicios gratuitos de asistencia lingüística. Llame al [phone].    We comply with applicable federal civil rights laws and [place] laws. We do not discriminate on the basis of race, color, national origin, age, disability, sex, sexual orientation, or gender identity.            Thank you!     Thank you for choosing [facility]  for your care. Our goal is always to " provide you with excellent care. Hearing back from our patients is one way we can continue to improve our services. Please take a few minutes to complete the written survey that you may receive in the mail after your visit with us. Thank you!             Your Updated Medication List - Protect others around you: Learn how to safely use, store and throw away your medicines at www.disposemymeds.org.          This list is accurate as of 7/23/18  2:04 PM.  Always use your most recent med list.                   Brand Name Dispense Instructions for use Diagnosis    amLODIPine 10 MG tablet    NORVASC    90 tablet    TAKE 1 TABLET BY MOUTH DAILY    Hypertension goal BP (blood pressure) < 140/90       metoprolol succinate 100 MG 24 hr tablet    TOPROL-XL    90 tablet    TAKE 1 TABLET BY MOUTH DAILY    Hypertension goal BP (blood pressure) < 140/90       order for DME     1 Device    Equipment being ordered: knee brace,    Chronic pain of left knee

## 2018-07-23 NOTE — LETTER
"  7/23/2018      RE: Caty Llamas  [address]  [place] [address]       Sports Medicine Clinic Visit    PCP: Cass Langley    Caty Llamas is a 58 year old female who is seen  as self referral presenting with recurrent left knee pain and \"giving out\" of left knee past couple years.     Injury: none      Ht 5' 2\" (1.575 m)  Wt 229 lb (103.9 kg)  BMI 41.88 kg/m2         PMH:  Past Medical History:   Diagnosis Date     CARDIOVASCULAR SCREENING; LDL GOAL LESS THAN 160 5/9/2010     Cervical high risk HPV (human papillomavirus) test positive 03/27/2017    Neg 16/18     Hx of colposcopy with cervical biopsy 2011    wnl     Hypertension goal BP (blood pressure) < 140/90 12/5/2011     LSIL (low grade squamous intraepithelial lesion) on Pap smear 2011    Leblanc neg     Status post colposcopy 05/02/2017    ECC - negative for dysplasia       Active problem list:  Patient Active Problem List   Diagnosis     Onychomycosis bilat great toenails.      CARDIOVASCULAR SCREENING; LDL GOAL LESS THAN 160     Hypertension goal BP (blood pressure) < 140/90     Cervical high risk HPV (human papillomavirus) test positive     Shoulder pain, right     Advanced directives, HC packet sent 3/5/2013     Morbid obesity due to excess calories (H)     Adjustment disorder with anxious mood       FH:  Family History   Problem Relation Age of Onset     C.A.D. Father      Cerebrovascular Disease Father      Cancer Father      Diabetes Sister      Hypertension Sister      Diabetes Brother      Diabetes Sister      Hypertension Sister        SH:  Social History     Social History     Marital status: Single     Spouse name: N/A     Number of children: N/A     Years of education: N/A     Occupational History     Not on file.     Social History Main Topics     Smoking status: Never Smoker     Smokeless tobacco: Never Used      Comment: smoke every blue moon     Alcohol use Yes      Comment: very seldom     Drug use: No     " Sexual activity: Not Currently     Other Topics Concern     Not on file     Social History Narrative        Dairy/d 3 servings/d.     Caffeine 4-5 servings/d    Exercise 3-4 x week    Sunscreen used - No    Seatbelts used - Yes    Working smoke/CO detectors in the home - Yes    Guns stored in the home - No    Self Breast Exams - Yes    Self Testicular Exam - NA    Eye Exam up to date - No    Dental Exam up to date - No    Pap Smear up to date - No    Mammogram up to date - No    PSA up to date - NA    Dexa Scan up to date - No    Flex Sig / Colonoscopy up to date - No    Immunizations up to date - Yes    Abuse: Current or Past(Physical, Sexual or Emotional)- No    Do you feel safe in your environment - Yes    Tristian Farley CMA                       MEDS:  See EMR, reviewed  ALL:  See EMR, reviewed    REVIEW OF SYSTEMS:  CONSTITUTIONAL:NEGATIVE for fever, chills, change in weight  INTEGUMENTARY/SKIN: NEGATIVE for worrisome rashes, moles or lesions  EYES: NEGATIVE for vision changes or irritation  ENT/MOUTH: NEGATIVE for ear, mouth and throat problems  RESP:NEGATIVE for significant cough or SOB  BREAST: NEGATIVE for masses, tenderness or discharge  CV: NEGATIVE for chest pain, palpitations or peripheral edema  GI: NEGATIVE for nausea, abdominal pain, heartburn, or change in bowel habits  :NEGATIVE for frequency, dysuria, or hematuria  :NEGATIVE for frequency, dysuria, or hematuria  NEURO: NEGATIVE for weakness, dizziness or paresthesias  ENDOCRINE: NEGATIVE for temperature intolerance, skin/hair changes  HEME/ALLERGY/IMMUNE: NEGATIVE for bleeding problems  PSYCHIATRIC: NEGATIVE for changes in mood or affect        Subjective: This athletically active 58-year-old female in the last years had a tendency to notice more consistent left-sided knee discomfort that improves with Aleve and improves with physical activity.  She has been able to lose 15 pounds by starting a walking program, following it proper diet, and is  "interested in doing some exercise biking and light weight lifting.  In the past she has always been involved in sports of all kinds and even skydiving.  She is status post an ACL reconstruction of the left knee in 1991 and subsequently underwent a left knee arthroscopy by Dr. Maxwell with a partial meniscectomy, loose body removal and thermal shrinkage of the ACL in 2006.  She has a tendency to have swelling about the medial knee but no leydi locking episodes.  Sometimes she could not trust the knee and has a sense that will \"give out\" but there has been no specific injury.          Inspection:       No effusion      AP/lateral alignment tends toward bilateral varus; mild t/t medial ljt line      Non-tender: patellar facets, MCL, LCL, lateral joint line, medial joint line, IT band, pes anserine bursa, fibular head    Symmetrical medial and lateral patellar excursion, with no \"winking\" knee cap or obvious patella amita    Active Range of Motion: full extension.  flexion allowed beyond 100 degrees.    Strength: quad  5/5, Hamstrings  5/5, Gastroc  5/5, Tibialis anterior  5/5, Peroneals  5/5     Special tests: normal Valgus stress test, normal Varus, negative Lachman's test, negative pivot shift, negative posterior drawer, no posterolateral corner signs, negative Jeffery's, no apprehension with lateral stress of the patella.    Sensation intact.  Distal pulses intact.  Capillary refill normal.  Skin intact, without signs of cellulitis.    A/o x 3    Assessment: Severe left knee DJD    Plan: We went over her x-rays in detail which show nearly bone-on-bone changes in the medial joint space and also in the patellofemoral space with signs of her previous ACL surgery.  I do not see obvious laxity on her exam today.  She knows she would be a candidate for knee replacement but on the other hand she has improved her situation significantly by her weight loss, her exercise protocol and at this time she only needs to take pain " medicines intermittently.  We discussed other conservative options such as cortisone injections or Synvisc injections.  She does not feel she has enough pain to warrant one currently but she knows it is an option for the future if needed.  She will follow-up if not improved with conservative cares.  She had no further questions.                    Harinder Billings MD

## 2018-08-23 ENCOUNTER — TELEPHONE (OUTPATIENT)
Dept: FAMILY MEDICINE | Facility: CLINIC | Age: 58
End: 2018-08-23

## 2018-08-23 NOTE — TELEPHONE ENCOUNTER
8/23/2018    Call Regarding VIP Mammogram    Attempt 1    Message on voicemail    Patient is also due for: Preventive Health Screening Cervical/PAP    Outreach   SV

## 2018-09-04 NOTE — TELEPHONE ENCOUNTER
9/4/2018    Call Regarding VIP Mammogram    Attempt 2    Message on voicemail    Patient is also due for: Preventive Health Screening Cervical/PAP    Outreach   SV

## 2018-09-15 NOTE — TELEPHONE ENCOUNTER
9/15/2018    Attempt 3    Contacted patient in regards to scheduling VIP mammogram  Message on voicemail     Patient is also due for - Preventive Health Screening Cervical/PAP    Comments:       Outreach   AT

## 2018-10-15 ENCOUNTER — OFFICE VISIT (OUTPATIENT)
Dept: FAMILY MEDICINE | Facility: CLINIC | Age: 58
End: 2018-10-15
Payer: COMMERCIAL

## 2018-10-15 VITALS
WEIGHT: 223 LBS | RESPIRATION RATE: 16 BRPM | DIASTOLIC BLOOD PRESSURE: 84 MMHG | SYSTOLIC BLOOD PRESSURE: 134 MMHG | HEART RATE: 76 BPM | TEMPERATURE: 98.6 F | BODY MASS INDEX: 40.79 KG/M2

## 2018-10-15 DIAGNOSIS — H81.13 BENIGN PAROXYSMAL POSITIONAL VERTIGO, BILATERAL: Primary | ICD-10-CM

## 2018-10-15 PROCEDURE — 99213 OFFICE O/P EST LOW 20 MIN: CPT | Performed by: NURSE PRACTITIONER

## 2018-10-15 ASSESSMENT — PAIN SCALES - GENERAL: PAINLEVEL: NO PAIN (0)

## 2018-10-15 NOTE — PROGRESS NOTES
"  SUBJECTIVE:   Caty Llamas is a 58 year old female who presents to clinic today for the following health issues:    Dizziness  Onset: Labor Day Weekend - flew and had intense pain with landing, the next day did have some blood draining out of the right ear, vertigo started about a week later    Description:   Do you feel faint:  no   Does it feel like the surroundings (bed, room) are moving: YES  Unsteady/off balance: YES  Have you passed out or fallen: no, but has been close, has caught herself    Intensity: moderate    Progression of Symptoms:  improving    Accompanying Signs & Symptoms:  Heart palpitations: no   Nausea, vomiting: YES- nausea  Weakness in arms or legs: no   Fatigue: YES  Vision or speech changes: no, but did have trouble switching between computer screens at work   Ringing in ears (Tinnitus): no   Hearing Loss: YES- soundss like she is in a tunnel    History:   Head trauma/concussion hx: no   Previous similar symptoms: no   Recent bleeding history: no     Precipitating factors:   Worse with activity or head movement: YES  Any new medications (BP?): no   Alcohol/drug abuse/withdrawal: no     Alleviating factors:   Does staying in a fixed position give relief:  YES    Therapies Tried and outcome: Mucinex DM - after a week of taking did help with most of the \"gurggling\" in the ear and did lessen the frequency of the ear popping.    On the flight she was doing every thing to pop her ears. But on landing her ears hurt to bad that she had to  Hold her head.   The next day  She noticed some blood in her ear canal . Then 4 days later noticed that she was having some dizziness.    She started to  Take  Musinex DM . - this did  Help a lot.  She went from feeling dizzy the whole day and being very careful  To now she is feeling better,   Last night she tired it see if she could start the dizziness, ,  She got up,  Turned her head,  Changed position. And didn't have the severe dizziness,  There is still " some dizziness.     Problem list and histories reviewed & adjusted, as indicated.  Additional history: as documented    Patient Active Problem List   Diagnosis     Onychomycosis bilat great toenails.      CARDIOVASCULAR SCREENING; LDL GOAL LESS THAN 160     Hypertension goal BP (blood pressure) < 140/90     Cervical high risk HPV (human papillomavirus) test positive     Shoulder pain, right     Advanced directives, HC packet sent 3/5/2013     Morbid obesity due to excess calories (H)     Adjustment disorder with anxious mood     Past Surgical History:   Procedure Laterality Date     ARTHROSCOPY KNEE RT/LT  1988, 1994    left knee       Social History   Substance Use Topics     Smoking status: Never Smoker     Smokeless tobacco: Never Used      Comment: smoke every blue moon     Alcohol use Yes      Comment: very seldom     Family History   Problem Relation Age of Onset     C.A.D. Father      Cerebrovascular Disease Father      Cancer Father      Diabetes Sister      Hypertension Sister      Diabetes Brother      Diabetes Sister      Hypertension Sister          Current Outpatient Prescriptions   Medication Sig Dispense Refill     amLODIPine (NORVASC) 10 MG tablet TAKE 1 TABLET BY MOUTH DAILY 90 tablet 2     metoprolol succinate (TOPROL-XL) 100 MG 24 hr tablet TAKE 1 TABLET BY MOUTH DAILY 90 tablet 2     order for DME Equipment being ordered: knee brace, (Patient not taking: Reported on 12/11/2017) 1 Device 0     Allergies   Allergen Reactions     Butalbital-Aspirin-Caffeine      Salicylates      ferenol     BP Readings from Last 3 Encounters:   10/15/18 134/84   07/16/18 (!) 158/92   03/07/18 140/80    Wt Readings from Last 3 Encounters:   10/15/18 223 lb (101.2 kg)   07/23/18 229 lb (103.9 kg)   03/07/18 229 lb 6.4 oz (104.1 kg)                    Reviewed and updated as needed this visit by clinical staff  Tobacco  Allergies  Meds  Med Hx  Surg Hx  Fam Hx  Soc Hx      Reviewed and updated as needed this  "visit by Provider  Tobacco  Allergies  Meds  Med Hx  Surg Hx  Fam Hx  Soc Hx        ROS:  CONSTITUTIONAL: NEGATIVE for fever, chills, change in weight  ENT/MOUTH: POSITIVE for ear pain right, vertigo and see note above.    RESP: NEGATIVE for significant cough or SOB  CV: NEGATIVE for chest pain, palpitations or peripheral edema  NEURO: POSITIVE for vertigo and this is much better but not completely resolved      OBJECTIVE:     /84  Pulse 76  Temp 98.6  F (37  C) (Tympanic)  Resp 16  Wt 223 lb (101.2 kg)  BMI 40.79 kg/m2  Body mass index is 40.79 kg/(m^2).   GENERAL: healthy, alert and no distress  HENT: right ear: clear effusion, left ear: clear effusion, nose and mouth without ulcers or lesions, oropharynx clear, oral mucous membranes moist and sinuses: not tender  NECK: no adenopathy, no asymmetry, masses, or scars and thyroid normal to palpation  RESP: lungs clear to auscultation - no rales, rhonchi or wheezes  CV: regular rate and rhythm, normal S1 S2, no S3 or S4, no murmur, click or rub, no peripheral edema and peripheral pulses strong  ABDOMEN: soft, nontender, no hepatosplenomegaly, no masses and bowel sounds normal  NEURO: Normal strength and tone, sensory exam grossly normal, mentation intact, cranial nerves 2-12 intact, Romberg normal and is able to turn her head from side to side and up and down without or very little dizziness      Diagnostic Test Results:  none     ASSESSMENT/PLAN:     ASSESSMENT/PLAN:      ICD-10-CM    1. Benign paroxysmal positional vertigo, bilateral H81.13        Patient Instructions   You are due to labs. Will get that done when you come back for your physical     For the dizziness,   Do the \"ear \" exercises  Lay down and hold the position until dizziness resolves if you have dizziness,   Then turn your head to one side - hold the position if dizzy, then turn hour head to the other side and hold until the dizziness resolves     Avoid  Caffeine  And salt or at " "least limit them                   BMI:   Estimated body mass index is 40.79 kg/(m^2) as calculated from the following:    Height as of 7/23/18: 5' 2\" (1.575 m).    Weight as of this encounter: 223 lb (101.2 kg).   Weight management plan: Discussed healthy diet and exercise guidelines and patient will follow up in 6 months in clinic to re-evaluate.      MEDICATIONS:  Continue current medications without change  Work on weight loss  Regular exercise  See Patient Instructions    ADELA EMERSON NP, APRN [facility]    "

## 2018-10-15 NOTE — NURSING NOTE
"Initial /84 (BP Location: Right arm, Patient Position: Chair, Cuff Size: Adult Large)  Pulse 76  Temp 98.6  F (37  C) (Tympanic)  Resp 16  Wt 223 lb (101.2 kg)  BMI 40.79 kg/m2 Estimated body mass index is 40.79 kg/(m^2) as calculated from the following:    Height as of 7/23/18: 5' 2\" (1.575 m).    Weight as of this encounter: 223 lb (101.2 kg). .    Miryam Holland CMA ([facility])    "

## 2018-10-15 NOTE — PATIENT INSTRUCTIONS
"You are due to labs. Will get that done when you come back for your physical     For the dizziness,   Do the \"ear \" exercises  Lay down and hold the position until dizziness resolves if you have dizziness,   Then turn your head to one side - hold the position if dizzy, then turn hour head to the other side and hold until the dizziness resolves     Avoid  Caffeine  And salt or at least limit them        "

## 2018-10-15 NOTE — MR AVS SNAPSHOT
"              After Visit Summary   10/15/2018    Caty Llamas    MRN: [id number]           Patient Information     Date Of Birth          1960        Visit Information        Provider Department      10/15/2018 9:40 AM Cass Langley APRN [facility]        Today's Diagnoses     Benign paroxysmal positional vertigo, bilateral    -  1      Care Instructions    You are due to labs. Will get that done when you come back for your physical     For the dizziness,   Do the \"ear \" exercises  Lay down and hold the position until dizziness resolves if you have dizziness,   Then turn your head to one side - hold the position if dizzy, then turn hour head to the other side and hold until the dizziness resolves     Avoid  Caffeine  And salt or at least limit them                Follow-ups after your visit        Who to contact     Normal or non-critical lab and imaging results will be communicated to you by [employer], letter or phone within 4 business days after the clinic has received the results. If you do not hear from us within 7 days, please contact the clinic through [employer] or phone. If you have a critical or abnormal lab result, we will notify you by phone as soon as possible.  Submit refill requests through [employer] or call your pharmacy and they will forward the refill request to us. Please allow 3 business days for your refill to be completed.          If you need to speak with a  for additional information , please call: [phone]           Additional Information About Your Visit        [employer] Information     [employer] lets you send messages to your doctor, view your test results, renew your prescriptions, schedule appointments and more. To sign up, go to [email or web address] . Click on \"Log in\" on the left side of the screen, which will take you to the Welcome page. Then click on \"Sign up Now\" on the right side of the page.     You will be asked to enter the " access code listed below, as well as some personal information. Please follow the directions to create your username and password.     Your access code is: [email or web address]  Expires: 2019 11:07 AM     Your access code will  in 90 days. If you need help or a new code, please call your [place] clinic or [phone].        Care EveryWhere ID     This is your Care EveryWhere ID. This could be used by other organizations to access your [place] medical records  [id number]        Your Vitals Were     Pulse Temperature Respirations BMI (Body Mass Index)          76 98.6  F (37  C) (Tympanic) 16 40.79 kg/m2         Blood Pressure from Last 3 Encounters:   10/15/18 134/84   18 (!) 158/92   18 140/80    Weight from Last 3 Encounters:   10/15/18 223 lb (101.2 kg)   18 229 lb (103.9 kg)   18 229 lb 6.4 oz (104.1 kg)              Today, you had the following     No orders found for display       Primary Care Provider Office Phone # Fax #    Cass Langley, [facility] [phone] [address] [place] [address]        Equal Access to Services     KEN RODRÍGUEZ : Hadii sarika barnharto Sotamaraali, waaxda luqadaha, qaybta kaalmada adeegyada, jonathan march hayrhonda olivares . So [facility] [phone].    ATENCIÓN: Si habla español, tiene a olivas disposición servicios gratuitos de asistencia lingüística. Llame al [phone].    We comply with applicable federal civil rights laws and [place] laws. We do not discriminate on the basis of race, color, national origin, age, disability, sex, sexual orientation, or gender identity.            Thank you!     Thank you for choosing [facility] [place]  for your care. Our goal is always to provide you with excellent care. Hearing back from our patients is one way we can continue to improve our services. Please take a few minutes to complete the written survey that you may receive in the mail after your visit with us.  Thank you!             Your Updated Medication List - Protect others around you: Learn how to safely use, store and throw away your medicines at www.disposemymeds.org.          This list is accurate as of 10/15/18 11:08 AM.  Always use your most recent med list.                   Brand Name Dispense Instructions for use Diagnosis    amLODIPine 10 MG tablet    NORVASC    90 tablet    TAKE 1 TABLET BY MOUTH DAILY    Hypertension goal BP (blood pressure) < 140/90       metoprolol succinate 100 MG 24 hr tablet    TOPROL-XL    90 tablet    TAKE 1 TABLET BY MOUTH DAILY    Hypertension goal BP (blood pressure) < 140/90       order for DME     1 Device    Equipment being ordered: knee brace,    Chronic pain of left knee

## 2018-11-14 ENCOUNTER — TELEPHONE (OUTPATIENT)
Dept: FAMILY MEDICINE | Facility: CLINIC | Age: 58
End: 2018-11-14

## 2018-11-14 NOTE — TELEPHONE ENCOUNTER
Pt is past due for f/u pap smear after colposcopy.  LMTC and schedule at [facility].  Starr Calderón,    Pap Tracking

## 2019-03-31 ENCOUNTER — ANCILLARY PROCEDURE (OUTPATIENT)
Dept: GENERAL RADIOLOGY | Facility: CLINIC | Age: 59
End: 2019-03-31
Attending: PHYSICIAN ASSISTANT
Payer: COMMERCIAL

## 2019-03-31 ENCOUNTER — OFFICE VISIT (OUTPATIENT)
Dept: URGENT CARE | Facility: URGENT CARE | Age: 59
End: 2019-03-31
Payer: COMMERCIAL

## 2019-03-31 VITALS
BODY MASS INDEX: 40.79 KG/M2 | SYSTOLIC BLOOD PRESSURE: 152 MMHG | HEART RATE: 79 BPM | OXYGEN SATURATION: 98 % | TEMPERATURE: 98.3 F | WEIGHT: 223 LBS | DIASTOLIC BLOOD PRESSURE: 84 MMHG

## 2019-03-31 DIAGNOSIS — J45.20 ASTHMATIC BRONCHITIS, MILD INTERMITTENT, UNCOMPLICATED: Primary | ICD-10-CM

## 2019-03-31 PROCEDURE — 71046 X-RAY EXAM CHEST 2 VIEWS: CPT

## 2019-03-31 PROCEDURE — 99213 OFFICE O/P EST LOW 20 MIN: CPT | Performed by: PHYSICIAN ASSISTANT

## 2019-03-31 RX ORDER — BENZONATATE 200 MG/1
200 CAPSULE ORAL 3 TIMES DAILY PRN
Qty: 30 CAPSULE | Refills: 0 | Status: SHIPPED | OUTPATIENT
Start: 2019-03-31 | End: 2019-05-13

## 2019-03-31 RX ORDER — ALBUTEROL SULFATE 90 UG/1
2 AEROSOL, METERED RESPIRATORY (INHALATION) EVERY 4 HOURS PRN
Qty: 18 G | Refills: 0 | Status: SHIPPED | OUTPATIENT
Start: 2019-03-31 | End: 2019-05-31

## 2019-03-31 RX ORDER — AZITHROMYCIN 250 MG/1
TABLET, FILM COATED ORAL
Qty: 6 TABLET | Refills: 0 | Status: SHIPPED | OUTPATIENT
Start: 2019-03-31 | End: 2019-05-13

## 2019-03-31 ASSESSMENT — ENCOUNTER SYMPTOMS
EYE PAIN: 0
WHEEZING: 1
CARDIOVASCULAR NEGATIVE: 1
COUGH: 1
DIAPHORESIS: 0
FEVER: 1
HEMOPTYSIS: 0
SHORTNESS OF BREATH: 1
WEIGHT LOSS: 0
SPUTUM PRODUCTION: 1
PALPITATIONS: 0
SORE THROAT: 0
GASTROINTESTINAL NEGATIVE: 1

## 2019-03-31 NOTE — PROGRESS NOTES
SUBJECTIVE:     HPI  Caty Llamas is a 59 year old female who presents to clinic today for the following health issues:  RESPIRATORY SYMPTOMS    Duration: 2weeks    Description  Productive cough, wheezing, shortness of breath and fever.  No hemoptysis    Severity: moderate    Accompanying signs and symptoms: No sore throat or sinus congestion/pain/pressure.  No abdominal pain, n/v, constipation, diarrhea, bloody or black tarry stools.     History (predisposing factors):  Ill contacts.  Hx of asthma.  Non-smoker.    Precipitating or alleviating factors: None    Therapies tried and outcome:  rest and fluids guaifenesin with minimal relief      Reviewed PMH, FMH and SOH.  Patient Active Problem List   Diagnosis     Onychomycosis bilat great toenails.      CARDIOVASCULAR SCREENING; LDL GOAL LESS THAN 160     Hypertension goal BP (blood pressure) < 140/90     Cervical high risk HPV (human papillomavirus) test positive     Shoulder pain, right     Advanced directives, HC packet sent 3/5/2013     Morbid obesity due to excess calories (H)     Adjustment disorder with anxious mood     Current Outpatient Medications   Medication Sig Dispense Refill     amLODIPine (NORVASC) 10 MG tablet TAKE 1 TABLET BY MOUTH DAILY 90 tablet 2     metoprolol succinate (TOPROL-XL) 100 MG 24 hr tablet TAKE 1 TABLET BY MOUTH DAILY 90 tablet 2     order for DME Equipment being ordered: knee brace, 1 Device 0     Allergies   Allergen Reactions     Butalbital-Aspirin-Caffeine      Salicylates      ferenol       Review of Systems   Constitutional: Positive for fever. Negative for diaphoresis and weight loss.   HENT: Negative.  Negative for congestion, ear pain and sore throat.    Eyes: Negative for pain.   Respiratory: Positive for cough, sputum production, shortness of breath and wheezing. Negative for hemoptysis.    Cardiovascular: Negative.  Negative for chest pain and palpitations.   Gastrointestinal: Negative.    Skin: Negative.    All  other systems reviewed and are negative.      /84   Pulse 79   Temp 98.3  F (36.8  C) (Oral)   Wt 101.2 kg (223 lb)   SpO2 98%   BMI 40.79 kg/m    Physical Exam   Constitutional: She is oriented to person, place, and time. She appears well-developed and well-nourished. No distress.   HENT:   Head: Normocephalic and atraumatic.   Nose: Nose normal.   Mouth/Throat: Uvula is midline, oropharynx is clear and moist and mucous membranes are normal. No oropharyngeal exudate or posterior oropharyngeal erythema. No tonsillar exudate.   TMs are intact without any erythema or bulging bilaterally.  Airway is patent.   Eyes: Conjunctivae and EOM are normal. Pupils are equal, round, and reactive to light. No scleral icterus.   Neck: Normal range of motion. Neck supple. No thyromegaly present.   Cardiovascular: Normal rate, regular rhythm, normal heart sounds and intact distal pulses. Exam reveals no gallop and no friction rub.   No murmur heard.  Pulmonary/Chest: Effort normal and breath sounds normal. No accessory muscle usage. No respiratory distress. She has no decreased breath sounds. She has no wheezes. She has no rhonchi. She has no rales.   Lymphadenopathy:     She has no cervical adenopathy.   Neurological: She is alert and oriented to person, place, and time.   Skin: Skin is warm and dry. No cyanosis. Nails show no clubbing.   Psychiatric: She has a normal mood and affect. Judgment normal.   Nursing note and vitals reviewed.  CXR PA/lateral:  No infiltrates, effusions or pneumothorax.  No suspicious nodules or lesions. No fractures.  Per my read.   Will send for overread.      Assessment/Plan:  Asthmatic bronchitis, mild intermittent, uncomplicated:  CXR was negative for pneumonia.  Will treat with zithromax X5days, tessalon perles, and albuterol inh as needed for symptoms.  Recommend treatment with rest, fluids and chicken soup. Tylenol/ibuprofen prn fever/pain.  Recheck in clinic if symptoms worsen or if  symptoms do not improve.  To the ER if he develops hemoptysis, chest pain, fevers>102, worsening shortness of breath/wheezing.    -     XR Chest 2 Views  -     azithromycin (ZITHROMAX) 250 MG tablet; 2 tablets the first day, then 1 tablet daily for the next 4 days  -     albuterol (PROAIR HFA/PROVENTIL HFA/VENTOLIN HFA) 108 (90 Base) MCG/ACT inhaler; Inhale 2 puffs into the lungs every 4 hours as needed for shortness of breath / dyspnea or wheezing  -     benzonatate (TESSALON) 200 MG capsule; Take 1 capsule (200 mg) by mouth 3 times daily as needed for cough          Theresa See MANOJ García

## 2019-04-08 ENCOUNTER — TELEPHONE (OUTPATIENT)
Dept: FAMILY MEDICINE | Facility: CLINIC | Age: 59
End: 2019-04-08

## 2019-04-08 NOTE — TELEPHONE ENCOUNTER
Patient was seen in ER and says she wants Cass to look at her X-rays, because she thinks she has pneumonia.    Rubina [facility]

## 2019-04-19 NOTE — TELEPHONE ENCOUNTER
Pt was seen 3/31/19 at [facility] in [place]. She did not have pneumonia. She was treated with albuterol inhaler, Z-Goran and Tessalon Perles for a dx of: Asthmatic bronchitis, mild intermittent, uncomplicated     Study Result     CHEST TWO VIEWS  3/31/2019 1:01 PM      HISTORY: Cough     COMPARISON: 3/27/2017                                                                      IMPRESSION: No acute abnormality. Lungs are well-inflated and clear.  Heart size is normal.     MD Park MATTHEWS RN

## 2019-05-13 ENCOUNTER — RESULT FOLLOW UP (OUTPATIENT)
Dept: FAMILY MEDICINE | Facility: CLINIC | Age: 59
End: 2019-05-13

## 2019-05-13 ENCOUNTER — OFFICE VISIT (OUTPATIENT)
Dept: FAMILY MEDICINE | Facility: CLINIC | Age: 59
End: 2019-05-13
Payer: COMMERCIAL

## 2019-05-13 VITALS
SYSTOLIC BLOOD PRESSURE: 154 MMHG | TEMPERATURE: 98.9 F | BODY MASS INDEX: 40.56 KG/M2 | DIASTOLIC BLOOD PRESSURE: 90 MMHG | OXYGEN SATURATION: 97 % | HEIGHT: 62 IN | RESPIRATION RATE: 16 BRPM | WEIGHT: 220.4 LBS | HEART RATE: 75 BPM

## 2019-05-13 DIAGNOSIS — Z12.31 VISIT FOR SCREENING MAMMOGRAM: ICD-10-CM

## 2019-05-13 DIAGNOSIS — M65.30 TRIGGER FINGER, ACQUIRED: ICD-10-CM

## 2019-05-13 DIAGNOSIS — E78.5 HYPERLIPIDEMIA LDL GOAL <160: ICD-10-CM

## 2019-05-13 DIAGNOSIS — I10 HYPERTENSION GOAL BP (BLOOD PRESSURE) < 140/90: ICD-10-CM

## 2019-05-13 DIAGNOSIS — J01.00 ACUTE NON-RECURRENT MAXILLARY SINUSITIS: ICD-10-CM

## 2019-05-13 DIAGNOSIS — Z12.4 SCREENING FOR CERVICAL CANCER: ICD-10-CM

## 2019-05-13 DIAGNOSIS — R87.810 CERVICAL HIGH RISK HPV (HUMAN PAPILLOMAVIRUS) TEST POSITIVE: Primary | ICD-10-CM

## 2019-05-13 DIAGNOSIS — Z11.51 SCREENING FOR HPV (HUMAN PAPILLOMAVIRUS): ICD-10-CM

## 2019-05-13 DIAGNOSIS — Z00.00 ROUTINE GENERAL MEDICAL EXAMINATION AT A HEALTH CARE FACILITY: Primary | ICD-10-CM

## 2019-05-13 DIAGNOSIS — Z23 ENCOUNTER FOR IMMUNIZATION: ICD-10-CM

## 2019-05-13 LAB
ANION GAP SERPL CALCULATED.3IONS-SCNC: 3 MMOL/L (ref 3–14)
BUN SERPL-MCNC: 15 MG/DL (ref 7–30)
CALCIUM SERPL-MCNC: 9.5 MG/DL (ref 8.5–10.1)
CHLORIDE SERPL-SCNC: 106 MMOL/L (ref 94–109)
CHOLEST SERPL-MCNC: 222 MG/DL
CO2 SERPL-SCNC: 29 MMOL/L (ref 20–32)
CREAT SERPL-MCNC: 0.72 MG/DL (ref 0.52–1.04)
CREAT UR-MCNC: 90 MG/DL
GFR SERPL CREATININE-BSD FRML MDRD: >90 ML/MIN/{1.73_M2}
GLUCOSE SERPL-MCNC: 100 MG/DL (ref 70–99)
HDLC SERPL-MCNC: 58 MG/DL
LDLC SERPL CALC-MCNC: 137 MG/DL
MICROALBUMIN UR-MCNC: 7 MG/L
MICROALBUMIN/CREAT UR: 7.65 MG/G CR (ref 0–25)
NONHDLC SERPL-MCNC: 164 MG/DL
POTASSIUM SERPL-SCNC: 3.7 MMOL/L (ref 3.4–5.3)
SODIUM SERPL-SCNC: 138 MMOL/L (ref 133–144)
TRIGL SERPL-MCNC: 135 MG/DL

## 2019-05-13 PROCEDURE — 80061 LIPID PANEL: CPT | Performed by: NURSE PRACTITIONER

## 2019-05-13 PROCEDURE — 80048 BASIC METABOLIC PNL TOTAL CA: CPT | Performed by: NURSE PRACTITIONER

## 2019-05-13 PROCEDURE — 99396 PREV VISIT EST AGE 40-64: CPT | Mod: 25 | Performed by: NURSE PRACTITIONER

## 2019-05-13 PROCEDURE — 87624 HPV HI-RISK TYP POOLED RSLT: CPT | Performed by: NURSE PRACTITIONER

## 2019-05-13 PROCEDURE — 90715 TDAP VACCINE 7 YRS/> IM: CPT | Performed by: NURSE PRACTITIONER

## 2019-05-13 PROCEDURE — 82043 UR ALBUMIN QUANTITATIVE: CPT | Performed by: NURSE PRACTITIONER

## 2019-05-13 PROCEDURE — 88175 CYTOPATH C/V AUTO FLUID REDO: CPT | Performed by: NURSE PRACTITIONER

## 2019-05-13 PROCEDURE — 99213 OFFICE O/P EST LOW 20 MIN: CPT | Mod: 25 | Performed by: NURSE PRACTITIONER

## 2019-05-13 PROCEDURE — 36415 COLL VENOUS BLD VENIPUNCTURE: CPT | Performed by: NURSE PRACTITIONER

## 2019-05-13 PROCEDURE — 90471 IMMUNIZATION ADMIN: CPT | Performed by: NURSE PRACTITIONER

## 2019-05-13 RX ORDER — METOPROLOL SUCCINATE 100 MG/1
100 TABLET, EXTENDED RELEASE ORAL DAILY
Qty: 90 TABLET | Refills: 2 | Status: SHIPPED | OUTPATIENT
Start: 2019-05-13 | End: 2019-07-15

## 2019-05-13 RX ORDER — CHLORTHALIDONE 25 MG/1
25 TABLET ORAL DAILY
Qty: 90 TABLET | Refills: 3 | Status: SHIPPED | OUTPATIENT
Start: 2019-05-13 | End: 2020-06-10

## 2019-05-13 RX ORDER — AMOXICILLIN 500 MG/1
1000 CAPSULE ORAL 2 TIMES DAILY
Qty: 40 CAPSULE | Refills: 0 | Status: SHIPPED | OUTPATIENT
Start: 2019-05-13 | End: 2020-03-07

## 2019-05-13 RX ORDER — AMLODIPINE BESYLATE 10 MG/1
10 TABLET ORAL DAILY
Qty: 90 TABLET | Refills: 2 | Status: SHIPPED | OUTPATIENT
Start: 2019-05-13 | End: 2019-07-15

## 2019-05-13 ASSESSMENT — MIFFLIN-ST. JEOR: SCORE: 1519.98

## 2019-05-13 ASSESSMENT — PAIN SCALES - GENERAL: PAINLEVEL: NO PAIN (0)

## 2019-05-13 NOTE — PATIENT INSTRUCTIONS
Preventive Health Recommendations  Female Ages 50 - 64    Yearly exam: See your health care provider every year in order to  o Review health changes.   o Discuss preventive care.    o Review your medicines if your doctor has prescribed any.      Get a Pap test every three years (unless you have an abnormal result and your provider advises testing more often).    If you get Pap tests with HPV test, you only need to test every 5 years, unless you have an abnormal result.     You do not need a Pap test if your uterus was removed (hysterectomy) and you have not had cancer.    You should be tested each year for STDs (sexually transmitted diseases) if you're at risk.     Have a mammogram every 1 to 2 years.    Have a colonoscopy at age 50, or have a yearly FIT test (stool test). These exams screen for colon cancer.      Have a cholesterol test every 5 years, or more often if advised.    Have a diabetes test (fasting glucose) every three years. If you are at risk for diabetes, you should have this test more often.     If you are at risk for osteoporosis (brittle bone disease), think about having a bone density scan (DEXA).    Shots: Get a flu shot each year. Get a tetanus shot every 10 years.    Nutrition:     Eat at least 5 servings of fruits and vegetables each day.    Eat whole-grain bread, whole-wheat pasta and brown rice instead of white grains and rice.    Get adequate Calcium and Vitamin D.     Lifestyle    Exercise at least 150 minutes a week (30 minutes a day, 5 days a week). This will help you control your weight and prevent disease.    Limit alcohol to one drink per day.    No smoking.     Wear sunscreen to prevent skin cancer.     See your dentist every six months for an exam and cleaning.    See your eye doctor every 1 to 2 years.      Keep exercising with working on weight reduction     Stay well hydrated - urine should be clear.      For the elevated blood pressure add in the   Chlorthalidone     Start the  antibiotic .    For the sore throat use warm tea and honey or even just spoonful of honey and hold it to the back of the throat. This will help to decrease the inflammation and thin the mucous.    gargle with warm salt water.     If the snoring doesn't resolve as the cold and sinus infection resolve then consider a sleep study .   If all resolve  No need for a sleep study     If the trigger finger doesn't improve .

## 2019-05-13 NOTE — LETTER
October 27, 2020      Caty Llamas  [address] [place] [address]        Dear ,    At [facility], your health and wellness are our primary concern. That is why we are following up on your most recent Colposcopy.    Please call [phone] to schedule an appointment for your recommended follow-up Pap smear and Human Papillomavirus (HPV) test at your earliest convenience.     If you have completed the appointment outside of the [facility] system, please have the records forwarded to our office. We will update your chart for your provider to review before your next annual wellness visit.     Thank you for choosing [facility]!      Sincerely,    Your [facility] Care Team/cmc

## 2019-05-13 NOTE — NURSING NOTE
"Initial /90 (BP Location: Left arm, Patient Position: Chair, Cuff Size: Adult Regular)   Pulse 75   Temp 98.9  F (37.2  C) (Tympanic)   Resp 16   Ht 1.578 m (5' 2.13\")   Wt 100 kg (220 lb 6.4 oz)   SpO2 97%   BMI 40.15 kg/m   Estimated body mass index is 40.15 kg/m  as calculated from the following:    Height as of this encounter: 1.578 m (5' 2.13\").    Weight as of this encounter: 100 kg (220 lb 6.4 oz). .    Miryam Holland CMA ([facility])    "

## 2019-05-13 NOTE — PROGRESS NOTES
"   SUBJECTIVE:   CC: Caty Llamas is an 59 year old woman who presents for preventive health visit.     Healthy Habits:    Do you get at least three servings of calcium containing foods daily (dairy, green leafy vegetables, etc.)? yes    Amount of exercise or daily activities, outside of work: 5-7 day(s) per week, does go on walks at work also    Problems taking medications regularly No    Medication side effects: No    Have you had an eye exam in the past two years? no    Do you see a dentist twice per year? YES,  states that she has an uncle who is a dentist    Do you have sleep apnea, excessive snoring or daytime drowsiness?Difficulty sleeping over the past few months, daytime drowsiness and a friend did notice her snoring recently and states that she watched her have to wake up for air. Does report better sleep when she sits in recliner.    *Does have a productive cough that won't go away, sister had a similar cough and found the she had PE and then ended up having stroke. This cough has been constant for the past couple of months, did go to  in March and was given tessalon and azithromycin which did give her 2 \"good days\", then cough returned. Feels better when she is at home. Has been having issues with air quality at work.,  Lungs just don't feel normal.     *Is needing to get 90 day prescriptions of her albuterol due to insurance. Is wanting to have Flovent and Ventolin.    Today's PHQ-2 Score:   PHQ-2 ( 1999 [employer]) 10/15/2018 3/27/2017   Q1: Little interest or pleasure in doing things 0 0   Q2: Feeling down, depressed or hopeless 0 0   PHQ-2 Score 0 0     Abuse: Current or Past(Physical, Sexual or Emotional)- No  Do you feel safe in your environment? Yes    Social History     Tobacco Use     Smoking status: Never Smoker     Smokeless tobacco: Never Used     Tobacco comment: smoke every blue moon   Substance Use Topics     Alcohol use: Yes     Comment: very seldom     If you drink alcohol do you " typically have >3 drinks per day or >7 drinks per week? No                     Reviewed orders with patient.  Reviewed health maintenance and updated orders accordingly - Yes  Labs reviewed in EPIC    Mammogram Screening: Patient over age 50, mutual decision to screen reflected in health maintenance.    Pertinent mammograms are reviewed under the imaging tab.  History of abnormal Pap smear: YES - updated in Problem List and Health Maintenance accordingly  PAP / HPV Latest Ref Rng & Units 3/27/2017 8/14/2014 12/7/2011   PAP - NIL NIL LSIL(A)   HPV 16 DNA NEG Negative - -   HPV 18 DNA NEG Negative - -   OTHER HR HPV NEG Positive(A) - -     Reviewed and updated as needed this visit by clinical staff  Tobacco  Allergies  Meds  Med Hx  Surg Hx  Fam Hx  Soc Hx        Reviewed and updated as needed this visit by Provider  Tobacco  Allergies  Meds  Med Hx  Surg Hx  Fam Hx  Soc Hx           ROS:  CONSTITUTIONAL:POSITIVE  for weight loss and with diet changes.   INTEGUMENTARY/SKIN: NEGATIVE for worrisome rashes, moles or lesions  EYES: NEGATIVE for vision changes or irritation  ENT: NEGATIVE for ear, mouth and throat problems and current with dentist   RESP:POSITIVE for cough-productive, dyspnea on exertion and does have  Scant productive with cough. Thick yellow.  Cold  Weather will trigger a short duration cough.  Her cough is very annoying - is worse at work   5 other employees also  Have a cough when at work   BREAST: NEGATIVE for masses, tenderness or discharge  CV: NEGATIVE for chest pain, palpitations or peripheral edema  GI: NEGATIVE for nausea, abdominal pain, heartburn, or change in bowel habits   menopausal female: no unusual urinary symptoms, no unusual vaginal symptoms and no   MUSCULOSKELETAL:NEGATIVE for significant arthralgias or myalgia and POSITIVE  for right middle finger , does have a nodule at the base of the finger and it will pop with straightening it every now and then   NEURO: NEGATIVE  "for weakness, dizziness or paresthesias  ENDOCRINE: NEGATIVE for temperature intolerance, skin/hair changes  HEME/ALLERGY/IMMUNE: NEGATIVE for bleeding problems  PSYCHIATRIC: NEGATIVE for changes in mood or affect     OBJECTIVE:   /90 (BP Location: Left arm, Patient Position: Chair, Cuff Size: Adult Regular)   Pulse 75   Temp 98.9  F (37.2  C) (Tympanic)   Resp 16   Ht 1.578 m (5' 2.13\")   Wt 100 kg (220 lb 6.4 oz)   SpO2 97%   BMI 40.15 kg/m    EXAM:  GENERAL APPEARANCE: healthy, alert and no distress  EYES: Eyes grossly normal to inspection, PERRL and conjunctivae and sclerae normal  HENT: ear canals and TM's normal, nose and mouth without ulcers or lesions, oropharynx clear and oral mucous membranes moist, does have sinus pain   NECK: no adenopathy, no asymmetry, masses, or scars and thyroid normal to palpation  RESP: lungs clear to auscultation - no rales, rhonchi or wheezes  BREAST: normal without masses, tenderness or nipple discharge and no palpable axillary masses or adenopathy  CV: regular rate and rhythm, normal S1 S2, no S3 or S4, no murmur, click or rub, no peripheral edema and peripheral pulses strong  ABDOMEN: soft, nontender, no hepatosplenomegaly, no masses and bowel sounds normal   (female): normal female external genitalia, normal urethral meatus, vaginal mucosal atrophy noted, normal cervix, adnexae, and uterus without masses or abnormal discharge  MS: no musculoskeletal defects are noted, gait is age appropriate without ataxia  , RIGHT middle finger does have trigger finger.  Flexion is normal.  Does have popping with the right middle during  extension   SKIN: no suspicious lesions or rashes  SKIN: does have some discoloration with both lower legs.   NEURO: Normal strength and tone, sensory exam grossly normal, mentation intact and speech normal  PSYCH: mentation appears normal and affect normal/bright    Diagnostic Test Results:  Pending     ASSESSMENT/PLAN: "     ASSESSMENT/PLAN:      ICD-10-CM    1. Routine general medical examination at a health care facility Z00.00    2. Screening for cervical cancer Z12.4 Pap imaged thin layer diagnostic with HPV (select HPV order below)   3. Screening for HPV (human papillomavirus) Z11.51 HPV High Risk Types DNA Cervical   4. Hypertension goal BP (blood pressure) < 140/90 I10 amLODIPine (NORVASC) 10 MG tablet     metoprolol succinate ER (TOPROL-XL) 100 MG 24 hr tablet     Basic metabolic panel  (Ca, Cl, CO2, Creat, Gluc, K, Na, BUN)     Albumin Random Urine Quantitative with Creat Ratio     chlorthalidone (HYGROTON) 25 MG tablet   5. Visit for screening mammogram Z12.31 MA Screening Digital Bilateral   6. Hyperlipidemia LDL goal <160 E78.5 Lipid panel reflex to direct LDL Fasting   7. Acute non-recurrent maxillary sinusitis J01.00 amoxicillin (AMOXIL) 500 MG capsule   8. Trigger finger, acquired M65.30        Patient Instructions     Preventive Health Recommendations  Female Ages 50 - 64    Yearly exam: See your health care provider every year in order to  o Review health changes.   o Discuss preventive care.    o Review your medicines if your doctor has prescribed any.      Get a Pap test every three years (unless you have an abnormal result and your provider advises testing more often).    If you get Pap tests with HPV test, you only need to test every 5 years, unless you have an abnormal result.     You do not need a Pap test if your uterus was removed (hysterectomy) and you have not had cancer.    You should be tested each year for STDs (sexually transmitted diseases) if you're at risk.     Have a mammogram every 1 to 2 years.    Have a colonoscopy at age 50, or have a yearly FIT test (stool test). These exams screen for colon cancer.      Have a cholesterol test every 5 years, or more often if advised.    Have a diabetes test (fasting glucose) every three years. If you are at risk for diabetes, you should have this test more  "often.     If you are at risk for osteoporosis (brittle bone disease), think about having a bone density scan (DEXA).    Shots: Get a flu shot each year. Get a tetanus shot every 10 years.    Nutrition:     Eat at least 5 servings of fruits and vegetables each day.    Eat whole-grain bread, whole-wheat pasta and brown rice instead of white grains and rice.    Get adequate Calcium and Vitamin D.     Lifestyle    Exercise at least 150 minutes a week (30 minutes a day, 5 days a week). This will help you control your weight and prevent disease.    Limit alcohol to one drink per day.    No smoking.     Wear sunscreen to prevent skin cancer.     See your dentist every six months for an exam and cleaning.    See your eye doctor every 1 to 2 years.      Keep exercising with working on weight reduction     Stay well hydrated - urine should be clear.      For the elevated blood pressure add in the   Chlorthalidone     Start the antibiotic .    For the sore throat use warm tea and honey or even just spoonful of honey and hold it to the back of the throat. This will help to decrease the inflammation and thin the mucous.    gargle with warm salt water.     If the snoring doesn't resolve as the cold and sinus infection resolve then consider a sleep study .   If all resolve  No need for a sleep study     If the trigger finger doesn't improve .                   COUNSELING:   Reviewed preventive health counseling, as reflected in patient instructions       Regular exercise       Healthy diet/nutrition       Vision screening       Immunizations    Vaccinated for: TDAP          Estimated body mass index is 40.15 kg/m  as calculated from the following:    Height as of this encounter: 1.578 m (5' 2.13\").    Weight as of this encounter: 100 kg (220 lb 6.4 oz).    Weight management plan: Discussed healthy diet and exercise guidelines     reports that she has never smoked. She has never used smokeless tobacco.      Counseling " Resources:  ATP IV Guidelines  Pooled Cohorts Equation Calculator  Breast Cancer Risk Calculator  FRAX Risk Assessment  ICSI Preventive Guidelines  Dietary Guidelines for Americans, 2010  USDA's MyPlate  ASA Prophylaxis  Lung CA Screening    ADELA EMERSON NP, APRN CNP  [facility]

## 2019-05-16 LAB
COPATH REPORT: NORMAL
PAP: NORMAL

## 2019-05-20 LAB
FINAL DIAGNOSIS: ABNORMAL
HPV HR 12 DNA CVX QL NAA+PROBE: POSITIVE
HPV16 DNA SPEC QL NAA+PROBE: NEGATIVE
HPV18 DNA SPEC QL NAA+PROBE: NEGATIVE
SPECIMEN DESCRIPTION: ABNORMAL
SPECIMEN SOURCE CVX/VAG CYTO: ABNORMAL

## 2019-05-20 NOTE — PROGRESS NOTES
12/7/11:Pap--LSIL.   12/27/11: [place] WNL. Plan: HPV DNA PCR in 12 months. If +, colposcopy; if negative, back to screening (annual). Whitney Tirado. Reminder placed in epic.   8/14/14:Pap--NIL, -HPV. Per ASCCP guidelines, repeat Pap + HPV cotesting in 3 years (2017).  3/27/17 NIL /+ HR HPV (not 16 or 18). Plan: colposcopy within 3 months. Due 6/27/17 5/2/17: [place] ECC - negative. Plan cotest in 1 year.   11/28/18 Patient is lost to pap tracking follow-up.  5/13/19 NIL Pap, + HR HPV (Neg 16/18). Plan colp   5/20/19 Message left to return call. (cristina) Pt notified. (cristina)  6/17/19 [place] Bx & ECC - Negative. Plan cotest in 1 year. (cristina)  6/19/19 Pt viewed result on [facility] (cristina)

## 2019-05-31 DIAGNOSIS — J45.20 ASTHMATIC BRONCHITIS, MILD INTERMITTENT, UNCOMPLICATED: ICD-10-CM

## 2019-05-31 RX ORDER — ALBUTEROL SULFATE 90 UG/1
2 AEROSOL, METERED RESPIRATORY (INHALATION) EVERY 4 HOURS PRN
Qty: 18 G | Refills: 2 | Status: SHIPPED | OUTPATIENT
Start: 2019-05-31 | End: 2021-04-19

## 2019-05-31 NOTE — TELEPHONE ENCOUNTER
"Requested Prescriptions   Pending Prescriptions Disp Refills     albuterol (PROAIR HFA/PROVENTIL HFA/VENTOLIN HFA) 108 (90 Base) MCG/ACT inhaler 18 g 0     Sig: Inhale 2 puffs into the lungs every 4 hours as needed for shortness of breath / dyspnea or wheezing       Asthma Maintenance Inhalers - Anticholinergics Failed - 5/31/2019  9:11 AM        Failed - Asthma control assessment score within normal limits in last 6 months     Please review ACT score.           Passed - Patient is age 12 years or older        Passed - Medication is active on med list        Passed - Recent (6 mo) or future (30 days) visit within the authorizing provider's specialty     Patient had office visit in the last 6 months or has a visit in the next 30 days with authorizing provider or within the authorizing provider's specialty.  See \"Patient Info\" tab in inbasket, or \"Choose Columns\" in Meds & Orders section of the refill encounter.            Last Written Prescription Date:  03/31/19  Last Fill Quantity: 18 g,  # refills: 0    Last office visit: 5/13/2019 with prescribing provider:  pako   Future Office Visit:   Next 5 appointments (look out 90 days)    Jun 17, 2019 10:45 AM CDT  Colposcopy with Aliya Eng MD, [facility]  [facility] [address] [facility] [address]  [phone]           "

## 2019-05-31 NOTE — TELEPHONE ENCOUNTER
Prescription approved per [facility] Refill Protocol or patient Primary care provider (PCP)  DANELLE Becker RN/Adolfo Laureano

## 2019-06-03 ENCOUNTER — ANCILLARY PROCEDURE (OUTPATIENT)
Dept: MAMMOGRAPHY | Facility: CLINIC | Age: 59
End: 2019-06-03
Attending: NURSE PRACTITIONER
Payer: COMMERCIAL

## 2019-06-03 DIAGNOSIS — Z12.31 VISIT FOR SCREENING MAMMOGRAM: ICD-10-CM

## 2019-06-17 ENCOUNTER — OFFICE VISIT (OUTPATIENT)
Dept: OBGYN | Facility: CLINIC | Age: 59
End: 2019-06-17
Payer: COMMERCIAL

## 2019-06-17 VITALS
RESPIRATION RATE: 16 BRPM | BODY MASS INDEX: 40.93 KG/M2 | HEIGHT: 62 IN | SYSTOLIC BLOOD PRESSURE: 134 MMHG | DIASTOLIC BLOOD PRESSURE: 82 MMHG | TEMPERATURE: 98.3 F | WEIGHT: 222.4 LBS | HEART RATE: 88 BPM

## 2019-06-17 DIAGNOSIS — R87.810 CERVICAL HIGH RISK HPV (HUMAN PAPILLOMAVIRUS) TEST POSITIVE: ICD-10-CM

## 2019-06-17 PROCEDURE — 57454 BX/CURETT OF CERVIX W/SCOPE: CPT | Performed by: OBSTETRICS & GYNECOLOGY

## 2019-06-17 PROCEDURE — 88305 TISSUE EXAM BY PATHOLOGIST: CPT | Performed by: OBSTETRICS & GYNECOLOGY

## 2019-06-17 ASSESSMENT — MIFFLIN-ST. JEOR: SCORE: 1527.05

## 2019-06-17 NOTE — NURSING NOTE
"Initial /82 (BP Location: Right arm, Patient Position: Sitting, Cuff Size: Adult Large)   Pulse 88   Temp 98.3  F (36.8  C) (Tympanic)   Resp 16   Ht 1.575 m (5' 2\")   Wt 100.9 kg (222 lb 6.4 oz)   Breastfeeding? No   BMI 40.68 kg/m   Estimated body mass index is 40.68 kg/m  as calculated from the following:    Height as of this encounter: 1.575 m (5' 2\").    Weight as of this encounter: 100.9 kg (222 lb 6.4 oz). .    Mary Lou Horne, HAMZAH    "

## 2019-06-19 LAB — COPATH REPORT: NORMAL

## 2019-06-19 NOTE — PROGRESS NOTES
Colposcopy Procedure Note  6/19/2019     INDICATIONS:                                                    Is a pregnancy test required: No.  Was a consent obtained?  Yes    Caty Llamas, is a 59 year old female with a history of abnormal pap smear, see history below.  Here today for colposcopy. Discussed indication, risks of infection and bleeding.  Discussed role of HPV in the development of abnormal pap smears and cervical cancer.  Discussed immune system support (healthy diet, adequate sleep, non smoking) as methods to help clear the HPV.    History of abnormal pap smears:  12/7/11:Pap--LSIL.   12/27/11: [place] WNL. Plan: HPV DNA PCR in 12 months. If +, colposcopy; if negative, back to screening (annual). Whitney Tirado. Reminder placed in epic.   8/14/14:Pap--NIL, -HPV. Per ASCCP guidelines, repeat Pap + HPV cotesting in 3 years (2017).  3/27/17 NIL /+ HR HPV (not 16 or 18). Plan: colposcopy within 3 months. Due 6/27/17 5/2/17: [place] ECC - negative. Plan cotest in 1 year.   11/28/18 Patient is lost to pap tracking follow-up.  5/13/19 NIL Pap, + HR HPV (Neg 16/18). Plan colp     PROCEDURE:                                                      Cervix is stained with acetic acid and viewed colposcopically.   Squamocolumnar junction is visualized in its entirity.   no mosaicism, no punctation, no abnormal vasculature and acetowhite lesion(s) noted at 9 o'clock .   Biopsy done: Yes.   Endocervical curettage: Done.    No images are attached to the encounter.     POST PROCEDURE:                                                      IMPRESSION: Patient tolerated procedure well    PLAN : Await the results of the biopsies.  Repeat pap in 12 months.  She tolerated the procedure well. There were no complications. Patient was discharged in stable condition.    Patient advised to call the clinic if excessive bleeding, pelvic pain, or fever.     Follow-up plan based on pathology results.    Aliya Eng MD    1. Cervical  high risk HPV (human papillomavirus) test positive  - Surgical pathology exam  - COLP CERVIX/UPPER VAGINA W BX CERVIX/ENDOCERV CURETT

## 2019-07-15 DIAGNOSIS — I10 HYPERTENSION GOAL BP (BLOOD PRESSURE) < 140/90: ICD-10-CM

## 2019-07-15 RX ORDER — AMLODIPINE BESYLATE 10 MG/1
10 TABLET ORAL DAILY
Qty: 90 TABLET | Refills: 2 | Status: SHIPPED | OUTPATIENT
Start: 2019-07-15 | End: 2020-05-04

## 2019-07-15 RX ORDER — METOPROLOL SUCCINATE 100 MG/1
100 TABLET, EXTENDED RELEASE ORAL DAILY
Qty: 90 TABLET | Refills: 2 | Status: SHIPPED | OUTPATIENT
Start: 2019-07-15 | End: 2020-05-04

## 2019-07-15 NOTE — TELEPHONE ENCOUNTER
BP Readings from Last 3 Encounters:   06/17/19 134/82   05/13/19 154/90   03/31/19 152/84       BP improved after addition of chlorthalidone ,    Prescription approved per [facility] Refill Protocol or patient Primary care provider (PCP)  DANELLE Becker  RN/Adolfo Laureano

## 2020-03-07 ENCOUNTER — OFFICE VISIT (OUTPATIENT)
Dept: URGENT CARE | Facility: URGENT CARE | Age: 60
End: 2020-03-07
Payer: COMMERCIAL

## 2020-03-07 VITALS
OXYGEN SATURATION: 96 % | DIASTOLIC BLOOD PRESSURE: 83 MMHG | SYSTOLIC BLOOD PRESSURE: 158 MMHG | HEIGHT: 62 IN | HEART RATE: 87 BPM | BODY MASS INDEX: 37.73 KG/M2 | TEMPERATURE: 99 F | WEIGHT: 205 LBS

## 2020-03-07 DIAGNOSIS — R82.90 NONSPECIFIC FINDING ON EXAMINATION OF URINE: Primary | ICD-10-CM

## 2020-03-07 LAB
ALBUMIN UR-MCNC: 30 MG/DL
APPEARANCE UR: ABNORMAL
BACTERIA #/AREA URNS HPF: ABNORMAL /HPF
BILIRUB UR QL STRIP: NEGATIVE
COLOR UR AUTO: YELLOW
GLUCOSE UR STRIP-MCNC: NEGATIVE MG/DL
HGB UR QL STRIP: ABNORMAL
HYALINE CASTS #/AREA URNS LPF: ABNORMAL /LPF
KETONES UR STRIP-MCNC: NEGATIVE MG/DL
LEUKOCYTE ESTERASE UR QL STRIP: ABNORMAL
MUCOUS THREADS #/AREA URNS LPF: PRESENT /LPF
NITRATE UR QL: NEGATIVE
NON-SQ EPI CELLS #/AREA URNS LPF: ABNORMAL /LPF
PH UR STRIP: 7 PH (ref 5–7)
RBC #/AREA URNS AUTO: ABNORMAL /HPF
SOURCE: ABNORMAL
SP GR UR STRIP: 1.02 (ref 1–1.03)
TRANS CELLS #/AREA URNS HPF: ABNORMAL /HPF
UROBILINOGEN UR STRIP-ACNC: 0.2 EU/DL (ref 0.2–1)
WBC #/AREA URNS AUTO: ABNORMAL /HPF

## 2020-03-07 PROCEDURE — 99213 OFFICE O/P EST LOW 20 MIN: CPT | Performed by: FAMILY MEDICINE

## 2020-03-07 PROCEDURE — 87186 SC STD MICRODIL/AGAR DIL: CPT | Performed by: FAMILY MEDICINE

## 2020-03-07 PROCEDURE — 87086 URINE CULTURE/COLONY COUNT: CPT | Performed by: FAMILY MEDICINE

## 2020-03-07 PROCEDURE — 81001 URINALYSIS AUTO W/SCOPE: CPT | Performed by: FAMILY MEDICINE

## 2020-03-07 PROCEDURE — 87088 URINE BACTERIA CULTURE: CPT | Performed by: FAMILY MEDICINE

## 2020-03-07 RX ORDER — PHENAZOPYRIDINE HYDROCHLORIDE 200 MG/1
200 TABLET, FILM COATED ORAL 3 TIMES DAILY PRN
Qty: 9 TABLET | Refills: 0 | Status: SHIPPED | OUTPATIENT
Start: 2020-03-07 | End: 2020-05-18

## 2020-03-07 RX ORDER — CIPROFLOXACIN 500 MG/1
500 TABLET, FILM COATED ORAL 2 TIMES DAILY
Qty: 10 TABLET | Refills: 0 | Status: SHIPPED | OUTPATIENT
Start: 2020-03-07 | End: 2020-05-18

## 2020-03-07 ASSESSMENT — MIFFLIN-ST. JEOR: SCORE: 1443.12

## 2020-03-07 NOTE — PATIENT INSTRUCTIONS
Patient Education     Anatomy of the Female Urinary Tract  Your urinary tract helps get rid of urine (your body s liquid waste). The kidneys collect chemicals and water your body doesn t need. This is turned into urine. Urine travels out of the kidneys through the ureters to the bladder. The bladder holds urine until you re ready to release it. The urethra carries urine from the bladder out of the body. The main sphincter muscle circles the mid-urethra.      Front view of female urinary tract.   Date Last Reviewed: 1/1/2017 2000-2019 [employer]. [address] [place] [id number]. All rights reserved. This information is not intended as a substitute for professional medical care. Always follow your healthcare professional's instructions.

## 2020-03-07 NOTE — PROGRESS NOTES
SUBJECTIVE: Caty Llamas is a 60 year old female who complains of urinary frequency, urgency and dysuria x 2-3 days, without flank pain, fever, chills, or abnormal vaginal discharge or bleeding.     OBJECTIVE: Appears well, in no apparent distress.  Vital signs are normal. The abdomen is soft without tenderness, guarding, mass, rebound or organomegaly. No CVA tenderness or inguinal adenopathy noted. Urine dipstick shows positive for WBC's, positive for RBC's, positive for nitrates, positive for leukocytes and positive for bacturia.  Micro exam:  WBC's per HPF.     ASSESSMENT: UTI uncomplicated without evidence of pyelonephritis          Diagnosis Comments   1. Nonspecific finding on examination of urine  Urine Culture Aerobic Bacterial, ciprofloxacin (CIPRO) 500 MG tablet, phenazopyridine (PYRIDIUM) 200 MG tablet        PLAN: Treatment per orders - also push fluids, may use Pyridium OTC prn. Call or return to clinic prn if these symptoms worsen or fail to improve as anticipated.    Mabel Rubalcava MD.

## 2020-03-07 NOTE — LETTER
March 7, 2020      Caty Llamas  [address]  [place] [address]        To Whom It May Concern:    Caty Llamas  was seen on 03/7/2020.  Please excuse her  until 03/08/2020 due to illness.  Off for the next 24 hours       Sincerely,        Mabel Rubalcava MD

## 2020-03-09 LAB
BACTERIA SPEC CULT: ABNORMAL
SPECIMEN SOURCE: ABNORMAL

## 2020-04-29 DIAGNOSIS — I10 HYPERTENSION GOAL BP (BLOOD PRESSURE) < 140/90: ICD-10-CM

## 2020-04-29 NOTE — TELEPHONE ENCOUNTER
BP Readings from Last 3 Encounters:   03/07/20 (!) 158/83  [facility] appt for UTI    06/17/19 134/82   05/13/19 154/90     DUE for [facility] BIRCH with Shivam SWEENEY for pt to   callback and make appt    Will address [facility]  RN/Adolfo Laureano

## 2020-05-04 DIAGNOSIS — I10 HYPERTENSION GOAL BP (BLOOD PRESSURE) < 140/90: ICD-10-CM

## 2020-05-04 RX ORDER — METOPROLOL SUCCINATE 100 MG/1
100 TABLET, EXTENDED RELEASE ORAL DAILY
Qty: 30 TABLET | Refills: 0 | Status: SHIPPED | OUTPATIENT
Start: 2020-05-04 | End: 2020-05-18

## 2020-05-04 RX ORDER — METOPROLOL SUCCINATE 100 MG/1
TABLET, EXTENDED RELEASE ORAL
Qty: 90 TABLET | Refills: 3 | OUTPATIENT
Start: 2020-05-04

## 2020-05-04 RX ORDER — AMLODIPINE BESYLATE 10 MG/1
TABLET ORAL
Qty: 90 TABLET | Refills: 3 | OUTPATIENT
Start: 2020-05-04

## 2020-05-04 RX ORDER — AMLODIPINE BESYLATE 10 MG/1
10 TABLET ORAL DAILY
Qty: 30 TABLET | Refills: 0 | Status: SHIPPED | OUTPATIENT
Start: 2020-05-04 | End: 2020-05-18

## 2020-05-04 NOTE — TELEPHONE ENCOUNTER
Pt was told she needs appt in order to get med's, she had appt today with yomaira min, nut there was a schedule mishap with yomaira and she is not available, pt states that she needs med's before next appt. Please advise.     Anitha Duarte, [place]

## 2020-05-04 NOTE — TELEPHONE ENCOUNTER
Annual appointment rescheduled by PCP for later this month. One month of refills provided.     Samaria Rowland PA-C on 5/4/2020 at 9:16 AM

## 2020-05-18 ENCOUNTER — VIRTUAL VISIT (OUTPATIENT)
Dept: FAMILY MEDICINE | Facility: CLINIC | Age: 60
End: 2020-05-18
Payer: COMMERCIAL

## 2020-05-18 DIAGNOSIS — I10 HYPERTENSION GOAL BP (BLOOD PRESSURE) < 140/90: ICD-10-CM

## 2020-05-18 PROCEDURE — 99213 OFFICE O/P EST LOW 20 MIN: CPT | Mod: TEL | Performed by: NURSE PRACTITIONER

## 2020-05-18 RX ORDER — AMLODIPINE BESYLATE 10 MG/1
10 TABLET ORAL DAILY
Qty: 90 TABLET | Refills: 0 | Status: SHIPPED | OUTPATIENT
Start: 2020-05-18 | End: 2020-09-01

## 2020-05-18 RX ORDER — METOPROLOL SUCCINATE 100 MG/1
100 TABLET, EXTENDED RELEASE ORAL DAILY
Qty: 90 TABLET | Refills: 0 | Status: SHIPPED | OUTPATIENT
Start: 2020-05-18 | End: 2020-09-01

## 2020-05-18 NOTE — PATIENT INSTRUCTIONS
Continue to  Exercise,       With diet changes, the weight reduction   When you feel comfortable to make an appointment /labs   Check blood pressure every now and then

## 2020-05-18 NOTE — PROGRESS NOTES
"Caty Llamas is a 60 year old female who is being evaluated via a billable telephone visit.      The patient has been notified of following:     \"This telephone visit will be conducted via a call between you and your physician/provider. We have found that certain health care needs can be provided without the need for a physical exam.  This service lets us provide the care you need with a short phone conversation.  If a prescription is necessary we can send it directly to your pharmacy.  If lab work is needed we can place an order for that and you can then stop by our lab to have the test done at a later time.    Telephone visits are billed at different rates depending on your insurance coverage. During this emergency period, for some insurers they may be billed the same as an in-person visit.  Please reach out to your insurance provider with any questions.    If during the course of the call the physician/provider feels a telephone visit is not appropriate, you will not be charged for this service.\"    Patient has given verbal consent for Telephone visit?  Yes    What phone number would you like to be contacted at? [phone]    How would you like to obtain your AVS? Mail a copy    Subjective     Caty Llamas is a 60 year old female who presents via phone visit today for the following health issues:    HPI     Hypertension Follow-up      Do you check your blood pressure regularly outside of the clinic? No - has lost 2# more (total of about 35# over the past year), has checked her BP a couple of times, 5/3 - 9AM 144/83, 5PM 135/81 - does have a cuff but states that it hurts too bad to take her BP. Will note some swelling and tightness in her lower legs and ankles on the days that she works or days that she has to wear socks.    Are you following a low salt diet? Yes    Are your blood pressures ever more than 140 on the top number (systolic) OR more   than 90 on the bottom number (diastolic), for example " 140/90? Yes      How many servings of fruits and vegetables do you eat daily?  2-3    On average, how many sweetened beverages do you drink each day (Examples: soda, juice, sweet tea, etc.  Do NOT count diet or artificially sweetened beverages)?   0     How many days per week do you exercise enough to make your heart beat faster? 5    How many minutes a day do you exercise enough to make your heart beat faster? 30 - 60    How many days per week do you miss taking your medication? 0           Patient Active Problem List   Diagnosis     Onychomycosis bilat great toenails.      CARDIOVASCULAR SCREENING; LDL GOAL LESS THAN 160     Hypertension goal BP (blood pressure) < 140/90     Cervical high risk HPV (human papillomavirus) test positive     Shoulder pain, right     Advanced directives,  packet sent 3/5/2013     Morbid obesity due to excess calories (H)     Adjustment disorder with anxious mood     Past Surgical History:   Procedure Laterality Date     ARTHROSCOPY KNEE RT/LT  1988, 1994    left knee       Social History     Tobacco Use     Smoking status: Never Smoker     Smokeless tobacco: Never Used     Tobacco comment: smoke every blue moon   Substance Use Topics     Alcohol use: Yes     Comment: very seldom     Family History   Problem Relation Age of Onset     C.A.D. Father      Cerebrovascular Disease Father      Cancer Father      Diabetes Sister      Hypertension Sister      Diabetes Brother      Diabetes Sister      Hypertension Sister          Current Outpatient Medications   Medication Sig Dispense Refill     albuterol (PROAIR HFA/PROVENTIL HFA/VENTOLIN HFA) 108 (90 Base) MCG/ACT inhaler Inhale 2 puffs into the lungs every 4 hours as needed for shortness of breath / dyspnea or wheezing 18 g 2     amLODIPine (NORVASC) 10 MG tablet Take 1 tablet (10 mg) by mouth daily 30 tablet 0     chlorthalidone (HYGROTON) 25 MG tablet Take 1 tablet (25 mg) by mouth daily 90 tablet 3     metoprolol succinate ER  (TOPROL-XL) 100 MG 24 hr tablet Take 1 tablet (100 mg) by mouth daily 30 tablet 0     order for DME Equipment being ordered: knee brace, 1 Device 0     Allergies   Allergen Reactions     Butalbital-Aspirin-Caffeine      Salicylates      ferenol     BP Readings from Last 3 Encounters:   03/07/20 (!) 158/83   06/17/19 134/82   05/13/19 154/90    Wt Readings from Last 3 Encounters:   03/07/20 93 kg (205 lb)   06/17/19 100.9 kg (222 lb 6.4 oz)   05/13/19 100 kg (220 lb 6.4 oz)                    Reviewed and updated as needed this visit by Provider         Review of Systems   CONSTITUTIONAL:POSITIVE  for weight loss and has changed her diet, cut out a lot of sugar and is exercising. Healthier eating    ENT/MOUTH: NEGATIVE for ear, mouth and throat problems  RESP: NEGATIVE  Shortness of  Breath,  Is taking clear lung that is over the counter,  Breath easy tea is helping,  Not using her inhaler   CARD: POSITIVE for  Intermittent fluid around her ankle, just  When she  Has socks on.   M:  NEGATIVE for knee pain related to her weight reduction and  Running . No more swelling in her knee                                                  PSYCHIATRIC: NEGATIVE for changes in mood or affect and is happier with the  Weight reduction, exercise    She is still working and is still going into the office. Likes this        Objective   Reported vitals:  There were no vitals taken for this visit.   healthy, alert and no distress  PSYCH: Alert and oriented times 3; coherent speech, normal   rate and volume, able to articulate logical thoughts, able   to abstract reason, no tangential thoughts, no hallucinations   or delusions  Her affect is normal, pleasant and full  RESP: No cough, no audible wheezing, able to talk in full sentences  Remainder of exam unable to be completed due to telephone visits  CARDIO: reviewed her cholesterol with the diet changes,  Weight reduction, exercise positive changes.    Will refill her medication                       I     Diagnostic Test Results:  Labs reviewed in Epic  none         Assessment/Plan:  ASSESSMENT/PLAN:      ICD-10-CM    1. Hypertension goal BP (blood pressure) < 140/90  I10 amLODIPine (NORVASC) 10 MG tablet     metoprolol succinate ER (TOPROL-XL) 100 MG 24 hr tablet       Continue to  Exercise,       With diet changes, the weight reduction   When you feel comfortable to make an appointment /labs   Check blood pressure every now and then         No follow-ups on file.      Phone call duration:  10 minutes

## 2020-06-10 DIAGNOSIS — I10 HYPERTENSION GOAL BP (BLOOD PRESSURE) < 140/90: ICD-10-CM

## 2020-06-10 RX ORDER — CHLORTHALIDONE 25 MG/1
TABLET ORAL
Qty: 90 TABLET | Refills: 0 | Status: SHIPPED | OUTPATIENT
Start: 2020-06-10 | End: 2020-09-17

## 2020-06-10 NOTE — TELEPHONE ENCOUNTER
BP Readings from Last 3 Encounters:   03/07/20 (!) 158/83   06/17/19 134/82   05/13/19 154/90       Prescription approved per [facility] Refill Protocol or patient Primary care provider (PCP) Chart and last OV reviewed   DANELLE Becker  RN/Adolfo Laureano

## 2020-07-01 ENCOUNTER — TELEPHONE (OUTPATIENT)
Dept: FAMILY MEDICINE | Facility: CLINIC | Age: 60
End: 2020-07-01

## 2020-07-01 NOTE — TELEPHONE ENCOUNTER
Patient  Was passed from [place] she has some questions about leg edema    For past several month she has noted lower leg edema, bilateral  Her legs are ok in AM then as day goes on ale when she works( works as a  )  Her legs swell , no oozing or open sores  Denies CP or SOB  Is taking all her BP medications as ordered  Does NOT add salt to diet  Try to eat low salt diet   Drinks lots of water  Has has significant weigh loss over past year working at [employer]   Legs are ok on her days off   Discussed sounds like dependent edema and to try   Compression stocking to put on first thing Lia  At breaks at work to sit with legs elevated for 15 min  Keep scheduled appt next week with Shivam   Pt ok with plan  May call before is need  DANELLE Montalvo  Clinic  RN/Adolfo Laureano

## 2020-07-06 ENCOUNTER — OFFICE VISIT (OUTPATIENT)
Dept: FAMILY MEDICINE | Facility: CLINIC | Age: 60
End: 2020-07-06
Payer: COMMERCIAL

## 2020-07-06 VITALS
WEIGHT: 217.8 LBS | RESPIRATION RATE: 12 BRPM | BODY MASS INDEX: 39.84 KG/M2 | HEART RATE: 76 BPM | TEMPERATURE: 98.8 F | DIASTOLIC BLOOD PRESSURE: 86 MMHG | SYSTOLIC BLOOD PRESSURE: 140 MMHG

## 2020-07-06 DIAGNOSIS — I10 HYPERTENSION GOAL BP (BLOOD PRESSURE) < 140/90: Primary | ICD-10-CM

## 2020-07-06 DIAGNOSIS — Z12.11 SCREEN FOR COLON CANCER: ICD-10-CM

## 2020-07-06 LAB
ANION GAP SERPL CALCULATED.3IONS-SCNC: 6 MMOL/L (ref 3–14)
BUN SERPL-MCNC: 19 MG/DL (ref 7–30)
CALCIUM SERPL-MCNC: 9.7 MG/DL (ref 8.5–10.1)
CHLORIDE SERPL-SCNC: 101 MMOL/L (ref 94–109)
CO2 SERPL-SCNC: 32 MMOL/L (ref 20–32)
CREAT SERPL-MCNC: 0.94 MG/DL (ref 0.52–1.04)
GFR SERPL CREATININE-BSD FRML MDRD: 65 ML/MIN/{1.73_M2}
GLUCOSE SERPL-MCNC: 100 MG/DL (ref 70–99)
POTASSIUM SERPL-SCNC: 3.5 MMOL/L (ref 3.4–5.3)
SODIUM SERPL-SCNC: 139 MMOL/L (ref 133–144)

## 2020-07-06 PROCEDURE — 82043 UR ALBUMIN QUANTITATIVE: CPT | Performed by: NURSE PRACTITIONER

## 2020-07-06 PROCEDURE — 99213 OFFICE O/P EST LOW 20 MIN: CPT | Performed by: NURSE PRACTITIONER

## 2020-07-06 PROCEDURE — 36415 COLL VENOUS BLD VENIPUNCTURE: CPT | Performed by: NURSE PRACTITIONER

## 2020-07-06 PROCEDURE — 80048 BASIC METABOLIC PNL TOTAL CA: CPT | Performed by: NURSE PRACTITIONER

## 2020-07-06 ASSESSMENT — PAIN SCALES - GENERAL: PAINLEVEL: NO PAIN (0)

## 2020-07-06 NOTE — LETTER
July 7, 2020      Caty Robertsz  [address]   [place] [address]        Dear     Anat,     I am helping to cover some of Adela's results since she is out of the office.   You are not spilling any protein into your urine-this is good.     Isabel Bustamante NP-C     Resulted Orders   Albumin Random Urine Quantitative with Creat Ratio   Result Value Ref Range    Creatinine Urine 66 mg/dL    Albumin Urine mg/L <5 mg/L    Albumin Urine mg/g Cr Unable to calculate due to low value 0 - 25 mg/g Cr   **Basic metabolic panel FUTURE anytime   Result Value Ref Range    Sodium 139 133 - 144 mmol/L    Potassium 3.5 3.4 - 5.3 mmol/L    Chloride 101 94 - 109 mmol/L    Carbon Dioxide 32 20 - 32 mmol/L    Anion Gap 6 3 - 14 mmol/L    Glucose 100 (H) 70 - 99 mg/dL    Urea Nitrogen 19 7 - 30 mg/dL    Creatinine 0.94 0.52 - 1.04 mg/dL    GFR Estimate 65 >60 mL/min/[1.73_m2]      Comment:      Non  GFR Calc  Starting 12/18/2018, serum creatinine based estimated GFR (eGFR) will be   calculated using the Chronic Kidney Disease Epidemiology Collaboration   (CKD-EPI) equation.      GFR Estimate If Black 75 >60 mL/min/[1.73_m2]      Comment:       GFR Calc  Starting 12/18/2018, serum creatinine based estimated GFR (eGFR) will be   calculated using the Chronic Kidney Disease Epidemiology Collaboration   (CKD-EPI) equation.      Calcium 9.7 8.5 - 10.1 mg/dL       If you have any questions or concerns, please call the clinic at the number listed above.       Sincerely,        ADELA EMERSON NP, APRN CNP

## 2020-07-06 NOTE — LETTER
July 9, 2020      Caty Llamas  [address] [place] [address]        Dear ,    We are writing to inform you of your test results.    The results of your recent kidney test, electrolytes (sodium, potassium, etc.) This measures body salts which are affected by medications and kidney function.} urine microalbumin were normal.     The results of your recent glucose (a screening test for diabetes) were  abnormal.   Limit the simple sugars and the simple carbohydrates   Keep exercising     Please note that test explanations are brief and do not reflect all diagnostic uses.     Please make a follow-up appointment if you have additional questions.     Resulted Orders   Albumin Random Urine Quantitative with Creat Ratio   Result Value Ref Range    Creatinine Urine 66 mg/dL    Albumin Urine mg/L <5 mg/L    Albumin Urine mg/g Cr Unable to calculate due to low value 0 - 25 mg/g Cr   **Basic metabolic panel FUTURE anytime   Result Value Ref Range    Sodium 139 133 - 144 mmol/L    Potassium 3.5 3.4 - 5.3 mmol/L    Chloride 101 94 - 109 mmol/L    Carbon Dioxide 32 20 - 32 mmol/L    Anion Gap 6 3 - 14 mmol/L    Glucose 100 (H) 70 - 99 mg/dL    Urea Nitrogen 19 7 - 30 mg/dL    Creatinine 0.94 0.52 - 1.04 mg/dL    GFR Estimate 65 >60 mL/min/[1.73_m2]      Comment:      Non  GFR Calc  Starting 12/18/2018, serum creatinine based estimated GFR (eGFR) will be   calculated using the Chronic Kidney Disease Epidemiology Collaboration   (CKD-EPI) equation.      GFR Estimate If Black 75 >60 mL/min/[1.73_m2]      Comment:       GFR Calc  Starting 12/18/2018, serum creatinine based estimated GFR (eGFR) will be   calculated using the Chronic Kidney Disease Epidemiology Collaboration   (CKD-EPI) equation.      Calcium 9.7 8.5 - 10.1 mg/dL       If you have any questions or concerns, please call the clinic at the number listed above.       Sincerely,        ADELA EMERSON NP, APRN  CNP

## 2020-07-06 NOTE — PROGRESS NOTES
Subjective     Caty Llamas is a 60 year old female who presents to clinic today for the following health issues:    HPI     Concern - Swelling  Onset: April 2020    Description:   Bilateral lower leg swelling intermittently over the past several months. Ankles will become painful and tight feeling. Socks will leave marks on her ankles. Feet are unaffected by the swelling. Patient reports adequate water intake, rarely drinks anything else. Does pay attention to sodium intake but does not know exactly how much her daily intake should be. Reports small area that seems different on the anterior right calf, will seem more swollen than the rest of the leg.    Intensity: moderate    Progression of Symptoms:  worsening and intermittent, becoming more persistent    Accompanying Signs & Symptoms:  This will mainly happen when she is at work. Is able to do normal activity around home without noting significant swelling.     Previous history of similar problem:   None    Therapies Tried and outcome: Elevation - Patient states that after she is able to elevate her feet the swelling will improve almost immediately, resolving completely in about 45 minutes.         Patient Active Problem List   Diagnosis     Onychomycosis bilat great toenails.      CARDIOVASCULAR SCREENING; LDL GOAL LESS THAN 160     Hypertension goal BP (blood pressure) < 140/90     Cervical high risk HPV (human papillomavirus) test positive     Shoulder pain, right     Advanced directives,  packet sent 3/5/2013     Morbid obesity due to excess calories (H)     Adjustment disorder with anxious mood     Past Surgical History:   Procedure Laterality Date     ARTHROSCOPY KNEE RT/LT  1988, 1994    left knee       Social History     Tobacco Use     Smoking status: Never Smoker     Smokeless tobacco: Never Used     Tobacco comment: smoke every blue moon   Substance Use Topics     Alcohol use: Yes     Comment: very seldom     Family History   Problem Relation Age  of Onset     C.A.D. Father      Cerebrovascular Disease Father      Cancer Father      Diabetes Sister      Hypertension Sister      Diabetes Brother      Diabetes Sister      Hypertension Sister          Current Outpatient Medications   Medication Sig Dispense Refill     albuterol (PROAIR HFA/PROVENTIL HFA/VENTOLIN HFA) 108 (90 Base) MCG/ACT inhaler Inhale 2 puffs into the lungs every 4 hours as needed for shortness of breath / dyspnea or wheezing 18 g 2     amLODIPine (NORVASC) 10 MG tablet Take 1 tablet (10 mg) by mouth daily 90 tablet 0     chlorthalidone (HYGROTON) 25 MG tablet TAKE 1 TABLET BY MOUTH EVERY DAY 90 tablet 0     metoprolol succinate ER (TOPROL-XL) 100 MG 24 hr tablet Take 1 tablet (100 mg) by mouth daily 90 tablet 0     Allergies   Allergen Reactions     Butalbital-Aspirin-Caffeine      Salicylates      ferenol     BP Readings from Last 3 Encounters:   07/06/20 (!) 140/86   03/07/20 (!) 158/83   06/17/19 134/82    Wt Readings from Last 3 Encounters:   07/06/20 98.8 kg (217 lb 12.8 oz)   03/07/20 93 kg (205 lb)   06/17/19 100.9 kg (222 lb 6.4 oz)                      Reviewed and updated as needed this visit by Provider         Review of Systems   CONSTITUTIONAL: NEGATIVE for fever, chills, change in weight  ENT/MOUTH: NEGATIVE for ear, mouth and throat problems  RESP: NEGATIVE for significant cough or SOB  CV: NEGATIVE for chest pain, palpitations   POSITIVE peripheral edema  Started a couple of weeks ago.  Started to elevated ankles,  Is drinking her water.  At work she will sit,    During her breaks she will take quick walks,  And will have time to walk the parking lot ,    Uses very little salt,   Eats most of her meals at home.   Did use compression stockings.  ,  Will elevate her legs when sitting   PSY Positive for anxiety ,  Is teaching her self how to relax         Objective    BP (!) 140/86 (BP Location: Right arm, Patient Position: Chair, Cuff Size: Adult Large)   Pulse 76   Temp 98.8   F (37.1  C) (Tympanic)   Resp 12   Wt 98.8 kg (217 lb 12.8 oz)   BMI 39.84 kg/m    Body mass index is 39.84 kg/m .  Physical Exam   GENERAL: healthy, alert and no distress  NECK: no adenopathy, no asymmetry, masses, or scars and thyroid normal to palpation  RESP: lungs clear to auscultation - no rales, rhonchi or wheezes  CV: regular rate and rhythm, normal S1 S2, no S3 or S4, no murmur, click or rub, no peripheral edema and peripheral pulses strong,   No ankle edema to day .     MS: no gross musculoskeletal defects noted, no edema at this time     Diagnostic Test Results:  Labs reviewed in Epic  Pending       ASSESSMENT/PLAN:      ICD-10-CM    1. Hypertension goal BP (blood pressure) < 140/90  I10 Albumin Random Urine Quantitative with Creat Ratio     **Basic metabolic panel FUTURE anytime   2. Screen for colon cancer  Z12.11        Patient Instructions   Keep drinking your water,     Elevate legs when you are developing the ankle edema.     There is the possibility of changing your medication for your blood pressure if the ankle   Edema returns     The hot weather can cause ankle edema.   You can wear compression stockings.     Blood work was done to make sure kidney function is good.

## 2020-07-06 NOTE — LETTER
July 7, 2020      Caty Llamas  [address]   [place] [address]        Dear ,    The results of your recent kidney test (electrolytes (sodium, potassium, etc.) calcium - This measures body salts which are affected by medications and kidney function) were normal.     The results of your recent glucose (a screening test for diabetes) were abnormal. Your glucose is 1 point over normal. Limit the simple sugars and the simple carbohydrates   Exercise 30-60 min daily     Please note that test explanations are brief and do not reflect all diagnostic uses.     Please make a follow-up appointment if you have additional questions.     Cass Langley CNP- BC   Resulted Orders   Albumin Random Urine Quantitative with Creat Ratio   Result Value Ref Range    Creatinine Urine 66 mg/dL    Albumin Urine mg/L <5 mg/L    Albumin Urine mg/g Cr Unable to calculate due to low value 0 - 25 mg/g Cr   **Basic metabolic panel FUTURE anytime   Result Value Ref Range    Sodium 139 133 - 144 mmol/L    Potassium 3.5 3.4 - 5.3 mmol/L    Chloride 101 94 - 109 mmol/L    Carbon Dioxide 32 20 - 32 mmol/L    Anion Gap 6 3 - 14 mmol/L    Glucose 100 (H) 70 - 99 mg/dL    Urea Nitrogen 19 7 - 30 mg/dL    Creatinine 0.94 0.52 - 1.04 mg/dL    GFR Estimate 65 >60 mL/min/[1.73_m2]      Comment:      Non  GFR Calc  Starting 12/18/2018, serum creatinine based estimated GFR (eGFR) will be   calculated using the Chronic Kidney Disease Epidemiology Collaboration   (CKD-EPI) equation.      GFR Estimate If Black 75 >60 mL/min/[1.73_m2]      Comment:       GFR Calc  Starting 12/18/2018, serum creatinine based estimated GFR (eGFR) will be   calculated using the Chronic Kidney Disease Epidemiology Collaboration   (CKD-EPI) equation.      Calcium 9.7 8.5 - 10.1 mg/dL       If you have any questions or concerns, please call the clinic at the number listed above.       Sincerely,        CASS LANGLEY NP, APRN  CNP

## 2020-07-06 NOTE — NURSING NOTE
"Initial BP (!) 140/86 (BP Location: Right arm, Patient Position: Chair, Cuff Size: Adult Large)   Pulse 76   Temp 98.8  F (37.1  C) (Tympanic)   Resp 12   Wt 98.8 kg (217 lb 12.8 oz)   BMI 39.84 kg/m   Estimated body mass index is 39.84 kg/m  as calculated from the following:    Height as of 3/7/20: 1.575 m (5' 2\").    Weight as of this encounter: 98.8 kg (217 lb 12.8 oz). .    Miryam Holland CMA ([facility])    "

## 2020-07-06 NOTE — PATIENT INSTRUCTIONS
Keep drinking your water,     Elevate legs when you are developing the ankle edema.     There is the possibility of changing your medication for your blood pressure if the ankle   Edema returns     The hot weather can cause ankle edema.   You can wear compression stockings.     Blood work was done to make sure kidney function is good.

## 2020-07-07 LAB
CREAT UR-MCNC: 66 MG/DL
MICROALBUMIN UR-MCNC: <5 MG/L
MICROALBUMIN/CREAT UR: NORMAL MG/G CR (ref 0–25)

## 2020-08-31 DIAGNOSIS — I10 HYPERTENSION GOAL BP (BLOOD PRESSURE) < 140/90: ICD-10-CM

## 2020-09-01 RX ORDER — AMLODIPINE BESYLATE 10 MG/1
TABLET ORAL
Qty: 90 TABLET | Refills: 1 | Status: SHIPPED | OUTPATIENT
Start: 2020-09-01 | End: 2020-09-21

## 2020-09-01 RX ORDER — METOPROLOL SUCCINATE 100 MG/1
TABLET, EXTENDED RELEASE ORAL
Qty: 90 TABLET | Refills: 1 | Status: SHIPPED | OUTPATIENT
Start: 2020-09-01 | End: 2020-09-21

## 2020-09-01 NOTE — TELEPHONE ENCOUNTER
"Routing refill request to provider for review/approval because:Failed BP  LOV:  7/6/20, recommended 6 months Follow-up (1/6/2021)  Rubina Smith, RN    Requested Prescriptions   Pending Prescriptions Disp Refills     metoprolol succinate ER (TOPROL-XL) 100 MG 24 hr tablet [Pharmacy Med Name: METOPROLOL SUCC  MG TAB] 90 tablet 0     Sig: TAKE 1 TABLET BY MOUTH EVERY DAY       Beta-Blockers Protocol Failed - 8/31/2020 12:42 AM        Failed - Blood pressure under 140/90 in past 12 months     BP Readings from Last 3 Encounters:   07/06/20 (!) 140/86   03/07/20 (!) 158/83   06/17/19 134/82                 Passed - Patient is age 6 or older        Passed - Recent (12 mo) or future (30 days) visit within the authorizing provider's specialty     Patient has had an office visit with the authorizing provider or a provider within the authorizing providers department within the previous 12 mos or has a future within next 30 days. See \"Patient Info\" tab in inbasket, or \"Choose Columns\" in Meds & Orders section of the refill encounter.              Passed - Medication is active on med list           amLODIPine (NORVASC) 10 MG tablet [Pharmacy Med Name: AMLODIPINE BESYLATE 10 MG TAB] 90 tablet 0     Sig: TAKE 1 TABLET BY MOUTH EVERY DAY       Calcium Channel Blockers Protocol  Failed - 8/31/2020 12:42 AM        Failed - Blood pressure under 140/90 in past 12 months     BP Readings from Last 3 Encounters:   07/06/20 (!) 140/86   03/07/20 (!) 158/83   06/17/19 134/82                 Passed - Recent (12 mo) or future (30 days) visit within the authorizing provider's specialty     Patient has had an office visit with the authorizing provider or a provider within the authorizing providers department within the previous 12 mos or has a future within next 30 days. See \"Patient Info\" tab in inbasket, or \"Choose Columns\" in Meds & Orders section of the refill encounter.              Passed - Medication is active on med list        " Passed - Patient is age 18 or older        Passed - No active pregnancy on record        Passed - Normal serum creatinine on file in past 12 months     Recent Labs   Lab Test 07/06/20  1420   CR 0.94       Ok to refill medication if creatinine is low          Passed - No positive pregnancy test in past 12 months

## 2020-09-17 DIAGNOSIS — I10 HYPERTENSION GOAL BP (BLOOD PRESSURE) < 140/90: ICD-10-CM

## 2020-09-17 RX ORDER — CHLORTHALIDONE 25 MG/1
TABLET ORAL
Qty: 90 TABLET | Refills: 0 | Status: SHIPPED | OUTPATIENT
Start: 2020-09-17 | End: 2020-09-21

## 2020-09-17 NOTE — TELEPHONE ENCOUNTER
Prescription approved per [facility] Refill Protocol.  LOV: 7/6/2020  Rubina Smith RN      BP Readings from Last 3 Encounters:   07/06/20 (!) 140/86   03/07/20 (!) 158/83   06/17/19 134/82

## 2020-09-21 ENCOUNTER — TELEPHONE (OUTPATIENT)
Dept: FAMILY MEDICINE | Facility: CLINIC | Age: 60
End: 2020-09-21

## 2020-09-21 DIAGNOSIS — I10 HYPERTENSION GOAL BP (BLOOD PRESSURE) < 140/90: ICD-10-CM

## 2020-09-21 RX ORDER — AMLODIPINE BESYLATE 10 MG/1
10 TABLET ORAL DAILY
Qty: 90 TABLET | Refills: 0 | Status: SHIPPED | OUTPATIENT
Start: 2020-09-21 | End: 2020-12-21

## 2020-09-21 RX ORDER — METOPROLOL SUCCINATE 100 MG/1
100 TABLET, EXTENDED RELEASE ORAL DAILY
Qty: 90 TABLET | Refills: 0 | Status: SHIPPED | OUTPATIENT
Start: 2020-09-21 | End: 2020-12-21

## 2020-09-21 RX ORDER — CHLORTHALIDONE 25 MG/1
25 TABLET ORAL DAILY
Qty: 90 TABLET | Refills: 0 | Status: SHIPPED | OUTPATIENT
Start: 2020-09-21 | End: 2021-01-04

## 2020-09-21 NOTE — TELEPHONE ENCOUNTER
Prescription approved per [facility] Refill Protocol.  Swati Nye RN    Last office visit: 7.6.20 with prescribing provider:  Shivam   Requested Prescriptions   Pending Prescriptions Disp Refills     amLODIPine (NORVASC) 10 MG tablet 90 tablet 1     Sig: Take 1 tablet (10 mg) by mouth daily       There is no refill protocol information for this order        metoprolol succinate ER (TOPROL-XL) 100 MG 24 hr tablet 90 tablet 1     Sig: Take 1 tablet (100 mg) by mouth daily       There is no refill protocol information for this order        chlorthalidone (HYGROTON) 25 MG tablet 90 tablet 0     Sig: Take 1 tablet (25 mg) by mouth daily       There is no refill protocol information for this order

## 2020-12-18 DIAGNOSIS — I10 HYPERTENSION GOAL BP (BLOOD PRESSURE) < 140/90: ICD-10-CM

## 2020-12-21 RX ORDER — AMLODIPINE BESYLATE 10 MG/1
TABLET ORAL
Qty: 90 TABLET | Refills: 0 | Status: SHIPPED | OUTPATIENT
Start: 2020-12-21 | End: 2021-04-19

## 2020-12-21 RX ORDER — METOPROLOL SUCCINATE 100 MG/1
TABLET, EXTENDED RELEASE ORAL
Qty: 90 TABLET | Refills: 0 | Status: SHIPPED | OUTPATIENT
Start: 2020-12-21 | End: 2021-04-19

## 2020-12-21 NOTE — TELEPHONE ENCOUNTER
"Requested Prescriptions   Pending Prescriptions Disp Refills    metoprolol succinate ER (TOPROL-XL) 100 MG 24 hr tablet [Pharmacy Med Name: METOPROLOL SUCC  MG TAB] 90 tablet 0     Sig: TAKE 1 TABLET BY MOUTH EVERY DAY       Beta-Blockers Protocol Failed - 12/18/2020  7:52 AM        Failed - Blood pressure under 140/90 in past 12 months     BP Readings from Last 3 Encounters:   07/06/20 (!) 140/86   03/07/20 (!) 158/83   06/17/19 134/82                 Passed - Patient is age 6 or older        Passed - Recent (12 mo) or future (30 days) visit within the authorizing provider's specialty     Patient has had an office visit with the authorizing provider or a provider within the authorizing providers department within the previous 12 mos or has a future within next 30 days. See \"Patient Info\" tab in inbasket, or \"Choose Columns\" in Meds & Orders section of the refill encounter.              Passed - Medication is active on med list          amLODIPine (NORVASC) 10 MG tablet [Pharmacy Med Name: AMLODIPINE BESYLATE 10 MG TAB] 90 tablet 0     Sig: TAKE 1 TABLET BY MOUTH EVERY DAY       Calcium Channel Blockers Protocol  Failed - 12/18/2020  7:52 AM        Failed - Blood pressure under 140/90 in past 12 months     BP Readings from Last 3 Encounters:   07/06/20 (!) 140/86   03/07/20 (!) 158/83   06/17/19 134/82                 Passed - Recent (12 mo) or future (30 days) visit within the authorizing provider's specialty     Patient has had an office visit with the authorizing provider or a provider within the authorizing providers department within the previous 12 mos or has a future within next 30 days. See \"Patient Info\" tab in inbasket, or \"Choose Columns\" in Meds & Orders section of the refill encounter.              Passed - Medication is active on med list        Passed - Patient is age 18 or older        Passed - No active pregnancy on record        Passed - Normal serum creatinine on file in past 12 months     " Recent Labs   Lab Test 07/06/20  1420   CR 0.94       Ok to refill medication if creatinine is low          Passed - No positive pregnancy test in past 12 months           Routing refill request to provider for review/approval because:  Failed protocol.  Cori Nguyen BSN-RN  [facility]

## 2021-01-04 DIAGNOSIS — I10 HYPERTENSION GOAL BP (BLOOD PRESSURE) < 140/90: ICD-10-CM

## 2021-01-04 RX ORDER — CHLORTHALIDONE 25 MG/1
25 TABLET ORAL DAILY
Qty: 90 TABLET | Refills: 0 | Status: SHIPPED | OUTPATIENT
Start: 2021-01-04 | End: 2021-04-19

## 2021-01-04 NOTE — TELEPHONE ENCOUNTER
Reason for Call:  Medication or medication refill:    Do you use a [facility]?  Name of the pharmacy and phone number for the current request:  [facility]/PHARMACY #[id number] - [place], [place] - [address] [facility]    Name of the medication requested:   chlorthalidone (HYGROTON) 25 MG tablet    Other request: Patient called and is requesting a refill on this medication. Please advise thank you    Can we leave a detailed message on this number? YES    Phone number patient can be reached at: Home number on file [phone] (home)    Best Time: anyitme    Call taken on 1/4/2021 at 1:55 PM by Miryam Villarreal

## 2021-01-04 NOTE — TELEPHONE ENCOUNTER
Requested Prescriptions   Pending Prescriptions Disp Refills     chlorthalidone (HYGROTON) 25 MG tablet 90 tablet 0     Sig: Take 1 tablet (25 mg) by mouth daily       There is no refill protocol information for this order        Due to the high volume of refills.  We have been advised to start routing them directly to the provider.    Cori HUNTN-RN  Triage Nurse  [facility]

## 2021-01-11 ENCOUNTER — TELEPHONE (OUTPATIENT)
Dept: FAMILY MEDICINE | Facility: CLINIC | Age: 61
End: 2021-01-11

## 2021-01-11 NOTE — TELEPHONE ENCOUNTER
Reason for Call:  Other prescription    Detailed comments: patient is trying to get refill order sent to pharmacy, medication Chlorthalidone - 25MG tablets, patient only has 4 tablets left    Phone Number Patient can be reached at: Cell number on file:    Telephone Information:   Mobile [phone]       Best Time: anytime    Can we leave a detailed message on this number? YES    Call taken on 1/11/2021 at 11:48 AM by Gladys Liao

## 2021-01-11 NOTE — TELEPHONE ENCOUNTER
You have remaining refill(s) 1/4/21.  Often time the pharmacy's automated system does not recognize new medications that are sent.  You will need to call and speak with someone directly.    Verbalized good understanding.   April Lambert RN

## 2021-02-04 ENCOUNTER — PATIENT OUTREACH (OUTPATIENT)
Dept: FAMILY MEDICINE | Facility: CLINIC | Age: 61
End: 2021-02-04

## 2021-02-04 DIAGNOSIS — R87.810 CERVICAL HIGH RISK HPV (HUMAN PAPILLOMAVIRUS) TEST POSITIVE: ICD-10-CM

## 2021-03-04 NOTE — TELEPHONE ENCOUNTER
FYI to provider - Patient is lost to pap tracking follow-up. Attempts to contact pt have been made per reminder process and there has been no reply and/or no appt scheduled.       12/7/11:Pap--LSIL.   12/27/11: [facility] WNL. Plan: HPV DNA PCR in 12 months. If +, colposcopy; if negative, back to screening (annual). Whitney Tirado. Reminder placed in epic.   8/14/14:Pap--NIL, -HPV. Per ASCCP guidelines, repeat Pap + HPV cotesting in 3 years (2017).  3/27/17 NIL /+ HR HPV (not 16 or 18). Plan: colposcopy within 3 months. Due 6/27/17 5/2/17: [facility] ECC - negative. Plan cotest in 1 year.   11/28/18 Patient is lost to pap tracking follow-up.  5/13/19 NIL Pap, + HR HPV (Neg 16/18). Plan colp   6/17/19 [facility] Bx & ECC - Negative. Plan cotest in 1 year.   10/27/20 Reminder letter  2/4/21 Reminder call -- left message  3/4/21 Lost to follow-up for pap tracking

## 2021-04-15 NOTE — PATIENT INSTRUCTIONS
It was a pleasure meeting you today.  You are doing a great job with the lifestyle changes you have made, keep this up.      You can try adding some miralax to get the stools back to your norm.       I suggest you schedule a fasting lab appt (no eating or drinking anything for 9 hours, water is ok) and then your annual physical with pap a few days later.  (we'll also order your mammo and colonoscopy at that time).     I do recommend a covid-19 vaccine, you can either schedule through [place] or the [facility].  [phone] or [phone].

## 2021-04-15 NOTE — PROGRESS NOTES
"   SUBJECTIVE:   CC: Caty Llamas is an 61 year old woman who presents for preventive health visit.     {Split Bill scripting  The purpose of this visit is to discuss your medical history and prevent health problems before you are sick. You may be responsible for a co-pay, coinsurance, or deductible if your visit today includes services such as checking on a sore throat, having an x-ray or lab test, or treating and evaluating a new or existing condition :091003}  Patient has been advised of split billing requirements and indicates understanding: {Yes and No:316010}  Healthy Habits:    Do you get at least three servings of calcium containing foods daily (dairy, green leafy vegetables, etc.)? { :757864::\"yes\"}    Amount of exercise or daily activities, outside of work: { :631574}    Problems taking medications regularly { :856475::\"No\"}    Medication side effects: { :751538::\"No\"}    Have you had an eye exam in the past two years? { :469777}    Do you see a dentist twice per year? { :034159}    Do you have sleep apnea, excessive snoring or daytime drowsiness?{ :377802}  {Outside tests to abstract? :177462}    {additional problems to add (Optional):263618}    Today's PHQ-2 Score:   PHQ-2 ( 1999 Pfizer) 5/18/2020 10/15/2018   Q1: Little interest or pleasure in doing things 0 0   Q2: Feeling down, depressed or hopeless 0 0   PHQ-2 Score 0 0     {PHQ-2 LOOK IN ASSESSMENTS (Optional) :861322}  Abuse: Current or Past(Physical, Sexual or Emotional)- {YES/NO/NA:632750}  Do you feel safe in your environment? {YES/NO/NA:901709}    Have you ever done Advance Care Planning? (For example, a Health Directive, POLST, or a discussion with a medical provider or your loved ones about your wishes): { :425980}    Social History     Tobacco Use     Smoking status: Never Smoker     Smokeless tobacco: Never Used     Tobacco comment: smoke every blue moon   Substance Use Topics     Alcohol use: Yes     Comment: very seldom     If you " "drink alcohol do you typically have >3 drinks per day or >7 drinks per week? {ETOH :617582}                     Reviewed orders with patient.  Reviewed health maintenance and updated orders accordingly - {Yes/No:486805::\"Yes\"}  {Chronicprobdata (Optional):787013}    FSH-7: No flowsheet data found.  {If any of the questions to the BCRA (FHS-7) are answered yes, consider ordering referral for genetic counseling (Optional) :568492::\"click delete button to remove this line now\"}  {AMB Mammogram Decision Support (Optional) :867432}  Pertinent mammograms are reviewed under the imaging tab.    Pertinent mammograms are reviewed under the imaging tab.  History of abnormal Pap smear: {PAP HX:943931}  PAP / HPV Latest Ref Rng & Units 5/13/2019 3/27/2017 8/14/2014   PAP - NIL NIL NIL   HPV 16 DNA NEG:Negative Negative Negative -   HPV 18 DNA NEG:Negative Negative Negative -   OTHER HR HPV NEG:Negative Positive(A) Positive(A) -     Reviewed and updated as needed this visit by clinical staff                 Reviewed and updated as needed this visit by Provider                {HISTORY OPTIONS (Optional):257175}    ROS:  { :171695}    OBJECTIVE:   There were no vitals taken for this visit.  EXAM:  {Exam Choices:569700}    {Diagnostic Test Results (Optional):087530::\"Diagnostic Test Results:\",\"Labs reviewed in Epic\"}    ASSESSMENT/PLAN:   {Diag Picklist:580593}    Patient has been advised of split billing requirements and indicates understanding: {YES / NO:556223::\"Yes\"}  COUNSELING:   {FEMALE COUNSELING MESSAGES:576882::\"Reviewed preventive health counseling, as reflected in patient instructions\"}    Estimated body mass index is 39.84 kg/m  as calculated from the following:    Height as of 3/7/20: 1.575 m (5' 2\").    Weight as of 7/6/20: 98.8 kg (217 lb 12.8 oz).    {Weight Management Plan (ACO) Complete if BMI is abnormal-  Ages 18-64  BMI >24.9.  Age 65+ with BMI <23 or >30 (Optional):103856}    She reports that she has never " smoked. She has never used smokeless tobacco.      Counseling Resources:  ATP IV Guidelines  Pooled Cohorts Equation Calculator  Breast Cancer Risk Calculator  BRCA-Related Cancer Risk Assessment: FHS-7 Tool  FRAX Risk Assessment  ICSI Preventive Guidelines  Dietary Guidelines for Americans, 2010  USDA's MyPlate  ASA Prophylaxis  Lung CA Screening    MANOJ Rosas [facility]

## 2021-04-19 ENCOUNTER — OFFICE VISIT (OUTPATIENT)
Dept: FAMILY MEDICINE | Facility: CLINIC | Age: 61
End: 2021-04-19
Payer: COMMERCIAL

## 2021-04-19 VITALS
OXYGEN SATURATION: 98 % | HEIGHT: 62 IN | DIASTOLIC BLOOD PRESSURE: 83 MMHG | HEART RATE: 71 BPM | SYSTOLIC BLOOD PRESSURE: 137 MMHG | BODY MASS INDEX: 38.46 KG/M2 | TEMPERATURE: 98.4 F | WEIGHT: 209 LBS

## 2021-04-19 DIAGNOSIS — R19.5 CHANGE IN STOOL: ICD-10-CM

## 2021-04-19 DIAGNOSIS — J45.20 MILD INTERMITTENT ASTHMA WITHOUT COMPLICATION: ICD-10-CM

## 2021-04-19 DIAGNOSIS — I10 HYPERTENSION GOAL BP (BLOOD PRESSURE) < 140/90: ICD-10-CM

## 2021-04-19 DIAGNOSIS — Z87.19 H/O HIATAL HERNIA: Primary | ICD-10-CM

## 2021-04-19 DIAGNOSIS — E78.5 HYPERLIPIDEMIA LDL GOAL <160: ICD-10-CM

## 2021-04-19 PROCEDURE — 99215 OFFICE O/P EST HI 40 MIN: CPT | Performed by: PHYSICIAN ASSISTANT

## 2021-04-19 RX ORDER — METOPROLOL SUCCINATE 100 MG/1
100 TABLET, EXTENDED RELEASE ORAL DAILY
Qty: 90 TABLET | Refills: 1 | Status: SHIPPED | OUTPATIENT
Start: 2021-04-19 | End: 2021-05-24

## 2021-04-19 RX ORDER — POLYETHYLENE GLYCOL 3350 17 G/17G
17 POWDER, FOR SOLUTION ORAL DAILY PRN
Qty: 510 G | Refills: 0 | Status: SHIPPED | OUTPATIENT
Start: 2021-04-19 | End: 2022-10-24

## 2021-04-19 RX ORDER — AMLODIPINE BESYLATE 10 MG/1
10 TABLET ORAL DAILY
Qty: 90 TABLET | Refills: 1 | Status: SHIPPED | OUTPATIENT
Start: 2021-04-19 | End: 2021-05-24

## 2021-04-19 RX ORDER — ALBUTEROL SULFATE 90 UG/1
1-2 AEROSOL, METERED RESPIRATORY (INHALATION) EVERY 4 HOURS PRN
Qty: 9 G | Refills: 1 | Status: SHIPPED | OUTPATIENT
Start: 2021-04-19 | End: 2023-01-30

## 2021-04-19 RX ORDER — CHLORTHALIDONE 25 MG/1
25 TABLET ORAL DAILY
Qty: 90 TABLET | Refills: 1 | Status: SHIPPED | OUTPATIENT
Start: 2021-04-19 | End: 2021-05-24

## 2021-04-19 ASSESSMENT — MIFFLIN-ST. JEOR: SCORE: 1455.14

## 2021-04-19 NOTE — LETTER
My Asthma Action Plan    Name: Caty Llamas   YOB: 1960  Date: 4/19/2021   My doctor: Aleida Carrera PA-C   [facility]        My Rescue Medicine:   Albuterol inhaler (Proair/Ventolin/Proventil HFA)  2-4 puffs EVERY 4 HOURS as needed. Use a spacer if recommended by your provider.   My Asthma Severity:   Intermittent / Exercise Induced  Know your asthma triggers: see triggers below             GREEN ZONE   Good Control    I feel good    No cough or wheeze    Can work, sleep and play without asthma symptoms       Take your asthma control medicine every day.     1. If exercise triggers your asthma, take your rescue medication    15 minutes before exercise or sports, and    During exercise if you have asthma symptoms  2. Spacer to use with inhaler: If you have a spacer, make sure to use it with your inhaler             YELLOW ZONE Getting Worse  I have ANY of these:    I do not feel good    Cough or wheeze    Chest feels tight    Wake up at night   1. Keep taking your Green Zone medications  2. Start taking your rescue medicine:    every 20 minutes for up to 1 hour. Then every 4 hours for 24-48 hours.  3. If you stay in the Yellow Zone for more than 12-24 hours, contact your doctor.  4. If you do not return to the Green Zone in 12-24 hours or you get worse, start taking your oral steroid medicine if prescribed by your provider.           RED ZONE Medical Alert - Get Help  I have ANY of these:    I feel awful    Medicine is not helping    Breathing getting harder    Trouble walking or talking    Nose opens wide to breathe       1. Take your rescue medicine NOW  2. If your provider has prescribed an oral steroid medicine, start taking it NOW  3. Call your doctor NOW  4. If you are still in the Red Zone after 20 minutes and you have not reached your doctor:    Take your rescue medicine again and    Call 911 or go to the emergency room right away    See your regular  doctor within 2 weeks of an Emergency Room or Urgent Care visit for follow-up treatment.          Annual Reminders:  Meet with Asthma Educator,  Flu Shot in the Fall, consider Pneumonia Vaccination for patients with asthma (aged 19 and older).    Pharmacy:    [facility]/PHARMACY #[id number]  [place] - [address] AT [address] [facility]  [facility] - [place], [place] - [address]    Electronically signed by Aleida Carrera PA-C   Date: 04/19/21                    Asthma Triggers  How To Control Things That Make Your Asthma Worse    Triggers are things that make your asthma worse.  Look at the list below to help you find your triggers and   what you can do about them. You can help prevent asthma flare-ups by staying away from your triggers.      Trigger                                                          What you can do   Cigarette Smoke  Tobacco smoke can make asthma worse. Do not allow smoking in your home, car or around you.  Be sure no one smokes at a child s day care or school.  If you smoke, ask your health care provider for ways to help you quit.  Ask family members to quit too.  Ask your health care provider for a referral to Quit Plan to help you quit smoking, or call 8-807-853-PLAN.     Colds, Flu, Bronchitis  These are common triggers of asthma. Wash your hands often.  Don t touch your eyes, nose or mouth.  Get a flu shot every year.     Dust Mites  These are tiny bugs that live in cloth or carpet. They are too small to see. Wash sheets and blankets in hot water every week.   Encase pillows and mattress in dust mite proof covers.  Avoid having carpet if you can. If you have carpet, vacuum weekly.   Use a dust mask and HEPA vacuum.   Pollen and Outdoor Mold  Some people are allergic to trees, grass, or weed pollen, or molds. Try to keep your windows closed.  Limit time out doors when pollen count is high.   Ask you health care provider about taking medicine  during allergy season.     Animal Dander  Some people are allergic to skin flakes, urine or saliva from pets with fur or feathers. Keep pets with fur or feathers out of your home.    If you can t keep the pet outdoors, then keep the pet out of your bedroom.  Keep the bedroom door closed.  Keep pets off cloth furniture and away from stuffed toys.     Mice, Rats, and Cockroaches  Some people are allergic to the waste from these pests.   Cover food and garbage.  Clean up spills and food crumbs.  Store grease in the refrigerator.   Keep food out of the bedroom.   Indoor Mold  This can be a trigger if your home has high moisture. Fix leaking faucets, pipes, or other sources of water.   Clean moldy surfaces.  Dehumidify basement if it is damp and smelly.   Smoke, Strong Odors, and Sprays  These can reduce air quality. Stay away from strong odors and sprays, such as perfume, powder, hair spray, paints, smoke incense, paint, cleaning products, candles and new carpet.   Exercise or Sports  Some people with asthma have this trigger. Be active!  Ask your doctor about taking medicine before sports or exercise to prevent symptoms.    Warm up for 5-10 minutes before and after sports or exercise.     Other Triggers of Asthma  Cold air:  Cover your nose and mouth with a scarf.  Sometimes laughing or crying can be a trigger.  Some medicines and food can trigger asthma.

## 2021-04-19 NOTE — PROGRESS NOTES
"    Assessment & Plan     Hypertension goal BP (blood pressure) < 140/90  BP at goal, due to recheck labs (will do so fasting with lipids).  Last microalb normal.   - amLODIPine (NORVASC) 10 MG tablet; Take 1 tablet (10 mg) by mouth daily  - chlorthalidone (HYGROTON) 25 MG tablet; Take 1 tablet (25 mg) by mouth daily  - metoprolol succinate ER (TOPROL-XL) 100 MG 24 hr tablet; Take 1 tablet (100 mg) by mouth daily  - Basic metabolic panel; Future    H/O hiatal hernia  Diagnosed many years ago and has been symptom free since stopping soda.     Change in stool    No signs of bowel obstruction or other worrisome pathology.  No blood in stools.      You can try adding some miralax to get the stools back to your norm.     - polyethylene glycol (MIRALAX) 17 GM/Dose powder; Take 17 g by mouth daily as needed for constipation    Mild intermittent asthma without complication  Stable. AAP given.    - albuterol (PROAIR HFA/PROVENTIL HFA/VENTOLIN HFA) 108 (90 Base) MCG/ACT inhaler; Inhale 1-2 puffs into the lungs every 4 hours as needed for shortness of breath / dyspnea or wheezing    Hyperlipidemia LDL goal <160  Due to recheck. She has been following a healthier diet with lifestyle changes.    - Lipid panel reflex to direct LDL Fasting; Future    Discussed CDC guidelines on preventative screening for HIV screening.    Patient declines screening.     45 minutes spent on the date of the encounter doing chart review, history and exam, documentation and further activities per the note       BMI:   Estimated body mass index is 38.31 kg/m  as calculated from the following:    Height as of this encounter: 1.573 m (5' 1.93\").    Weight as of this encounter: 94.8 kg (209 lb).   Weight management plan: Discussed healthy diet and exercise guidelines        Return for Physical Exam.    Aleida Carrera PA-C  [facility] VELIA Bonds is a 61 year old who presents for the following health issues     HPI "     Hypertension Follow-up      Do you check your blood pressure regularly outside of the clinic? No     Are you following a low salt diet? Yes    Are your blood pressures ever more than 140 on the top number (systolic) OR more   than 90 on the bottom number (diastolic), for example 140/90? n/a      How many servings of fruits and vegetables do you eat daily?  4 or more    On average, how many sweetened beverages do you drink each day (Examples: soda, juice, sweet tea, etc.  Do NOT count diet or artificially sweetened beverages)?   0    How many days per week do you exercise enough to make your heart beat faster? 7    How many minutes a day do you exercise enough to make your heart beat faster? 30 - 60    How many days per week do you miss taking your medication? 0    Has lost some weight.  She has osteoarthritis in left knee (bone on bone).  She cooks her own meals and typically eating salads during the week and exercise during the week.     A few days ago she woke up with low back pain.  She had started some new exercising (sit ups)  She has pain in left inguinal/lower pelvic area.  Tried an aleve with relief for a few days.  Pain returned a few days later and seemed to shift to the center.Her bowels have been harder and smaller in size than normal for her (not rabbit pellets).  Her symptoms fully resolved yesterday (except still feels a little constipated).  Eats a salad 4/7 days per week.  Gets plenty of fiber.   No blood in stool.  No issues with hemorrhoids.    No nausea or vomiting.   Last colonoscopy was normal 3/26/2010.      Asthma:  Well controlled.  Has not needed albuterol. She feels wearing the mask has helped her asthma tremendously.        New Patient/Transfer of Care    Review of Systems   Constitutional, HEENT, cardiovascular, pulmonary, GI, , musculoskeletal, neuro, skin, endocrine and psych systems are negative, except as otherwise noted.      Objective    /83   Pulse 71   Temp 98.4  F  "(36.9  C) (Tympanic)   Ht 1.573 m (5' 1.93\")   Wt 94.8 kg (209 lb)   SpO2 98%   BMI 38.31 kg/m    Body mass index is 38.31 kg/m .  Physical Exam   GENERAL: healthy, alert and no distress  NECK: no adenopathy, no asymmetry, masses, or scars and thyroid normal to palpation  RESP: lungs clear to auscultation - no rales, rhonchi or wheezes  CV: regular rate and rhythm, normal S1 S2, no S3 or S4, no murmur, click or rub, no peripheral edema and peripheral pulses strong  ABDOMEN: soft, nontender, no hepatosplenomegaly, no masses and bowel sounds normal  MS: no gross musculoskeletal defects noted, no edema                "

## 2021-04-20 ASSESSMENT — ASTHMA QUESTIONNAIRES: ACT_TOTALSCORE: 25

## 2021-04-26 ENCOUNTER — IMMUNIZATION (OUTPATIENT)
Dept: NURSING | Facility: CLINIC | Age: 61
End: 2021-04-26
Payer: COMMERCIAL

## 2021-04-26 PROCEDURE — 0001A PR COVID VAC PFIZER DIL RECON 30 MCG/0.3 ML IM: CPT

## 2021-04-26 PROCEDURE — 91300 PR COVID VAC PFIZER DIL RECON 30 MCG/0.3 ML IM: CPT

## 2021-05-10 DIAGNOSIS — I10 HYPERTENSION GOAL BP (BLOOD PRESSURE) < 140/90: ICD-10-CM

## 2021-05-10 DIAGNOSIS — E78.5 HYPERLIPIDEMIA LDL GOAL <160: ICD-10-CM

## 2021-05-10 LAB
ANION GAP SERPL CALCULATED.3IONS-SCNC: 3 MMOL/L (ref 3–14)
BUN SERPL-MCNC: 17 MG/DL (ref 7–30)
CALCIUM SERPL-MCNC: 9.5 MG/DL (ref 8.5–10.1)
CHLORIDE SERPL-SCNC: 102 MMOL/L (ref 94–109)
CHOLEST SERPL-MCNC: 208 MG/DL
CO2 SERPL-SCNC: 32 MMOL/L (ref 20–32)
CREAT SERPL-MCNC: 0.92 MG/DL (ref 0.52–1.04)
GFR SERPL CREATININE-BSD FRML MDRD: 66 ML/MIN/{1.73_M2}
GLUCOSE SERPL-MCNC: 112 MG/DL (ref 70–99)
HDLC SERPL-MCNC: 49 MG/DL
LDLC SERPL CALC-MCNC: 143 MG/DL
NONHDLC SERPL-MCNC: 159 MG/DL
POTASSIUM SERPL-SCNC: 3.9 MMOL/L (ref 3.4–5.3)
SODIUM SERPL-SCNC: 137 MMOL/L (ref 133–144)
TRIGL SERPL-MCNC: 80 MG/DL

## 2021-05-10 PROCEDURE — 80061 LIPID PANEL: CPT | Performed by: PHYSICIAN ASSISTANT

## 2021-05-10 PROCEDURE — 36415 COLL VENOUS BLD VENIPUNCTURE: CPT | Performed by: PHYSICIAN ASSISTANT

## 2021-05-10 PROCEDURE — 80048 BASIC METABOLIC PNL TOTAL CA: CPT | Performed by: PHYSICIAN ASSISTANT

## 2021-05-17 ENCOUNTER — IMMUNIZATION (OUTPATIENT)
Dept: NURSING | Facility: CLINIC | Age: 61
End: 2021-05-17
Attending: FAMILY MEDICINE
Payer: COMMERCIAL

## 2021-05-17 PROCEDURE — 91300 PR COVID VAC PFIZER DIL RECON 30 MCG/0.3 ML IM: CPT

## 2021-05-17 PROCEDURE — 0002A PR COVID VAC PFIZER DIL RECON 30 MCG/0.3 ML IM: CPT

## 2021-05-17 NOTE — PATIENT INSTRUCTIONS
Please call [facility] at [phone]  to set up the MRI of knee.    Schedule f/u with ortho (either [facility] or [facility] ortho)    Preventive Health Recommendations  Female Ages 50 - 64    Yearly exam: See your health care provider every year in order to  o Review health changes.   o Discuss preventive care.    o Review your medicines if your doctor has prescribed any.      Get a Pap test every three years (unless you have an abnormal result and your provider advises testing more often).    If you get Pap tests with HPV test, you only need to test every 5 years, unless you have an abnormal result.     You do not need a Pap test if your uterus was removed (hysterectomy) and you have not had cancer.    You should be tested each year for STDs (sexually transmitted diseases) if you're at risk.     Have a mammogram every 1 to 2 years.    Have a colonoscopy at age 50, or have a yearly FIT test (stool test). These exams screen for colon cancer.      Have a cholesterol test every 5 years, or more often if advised.    Have a diabetes test (fasting glucose) every three years. If you are at risk for diabetes, you should have this test more often.     If you are at risk for osteoporosis (brittle bone disease), think about having a bone density scan (DEXA).    Shots: Get a flu shot each year. Get a tetanus shot every 10 years.    Nutrition:     Eat at least 5 servings of fruits and vegetables each day.    Eat whole-grain bread, whole-wheat pasta and brown rice instead of white grains and rice.    Get adequate Calcium and Vitamin D.     Lifestyle    Exercise at least 150 minutes a week (30 minutes a day, 5 days a week). This will help you control your weight and prevent disease.    Limit alcohol to one drink per day.    No smoking.     Wear sunscreen to prevent skin cancer.     See your dentist every six months for an exam and cleaning.    See your eye doctor every 1 to 2 years.

## 2021-05-17 NOTE — PROGRESS NOTES
SUBJECTIVE:   CC: Caty Llamas is an 61 year old woman who presents for preventive health visit.       Patient has been advised of split billing requirements and indicates understanding: Yes  Healthy Habits:    Do you get at least three servings of calcium containing foods daily (dairy, green leafy vegetables, etc.)? yes    Amount of exercise or daily activities, outside of work: 5 day(s) per week    Problems taking medications regularly No    Medication side effects: No    Have you had an eye exam in the past two years? no    Do you see a dentist twice per year? no    Do you have sleep apnea, excessive snoring or daytime drowsiness?no      Hypertension Follow-up  Amlodipine 10mg every day, chlorthalidone 25mg every day, metoprolol 100mg qd    Do you check your blood pressure regularly outside of the clinic? No     Are you following a low salt diet? Yes    Are your blood pressures ever more than 140 on the top number (systolic) OR more   than 90 on the bottom number (diastolic), for example 140/90? N/A    BP Readings from Last 6 Encounters:   05/17/21 126/78   04/19/21 137/83   07/06/20 (!) 140/86   03/07/20 (!) 158/83   06/17/19 134/82   05/13/19 154/90     Today's PHQ-2 Score:   PHQ-2 ( 1999 [employer]) 5/18/2020 10/15/2018   Q1: Little interest or pleasure in doing things 0 0   Q2: Feeling down, depressed or hopeless 0 0   PHQ-2 Score 0 0       Abuse: Current or Past(Physical, Sexual or Emotional)- No  Do you feel safe in your environment? Yes    Have you ever done Advance Care Planning? (For example, a Health Directive, POLST, or a discussion with a medical provider or your loved ones about your wishes): No, advance care planning information given to patient to review.  Patient declined advance care planning discussion at this time.    Social History     Tobacco Use     Smoking status: Never Smoker     Smokeless tobacco: Never Used     Tobacco comment: smoke every blue moon   Substance Use Topics     Alcohol  use: Yes     Comment: very seldom     If you drink alcohol do you typically have >3 drinks per day or >7 drinks per week? No                     Reviewed orders with patient.  Reviewed health maintenance and updated orders accordingly - Yes  Lab work is in process  Labs reviewed in EPIC  BP Readings from Last 3 Encounters:   05/24/21 122/71   05/17/21 126/78   04/19/21 137/83    Wt Readings from Last 3 Encounters:   05/24/21 92.4 kg (203 lb 12.8 oz)   05/17/21 95.5 kg (210 lb 8 oz)   04/19/21 94.8 kg (209 lb)                    FSH-7: No flowsheet data found.    Mammogram Screening: Recommended annual mammography  Pertinent mammograms are reviewed under the imaging tab.    Pertinent mammograms are reviewed under the imaging tab.  History of abnormal Pap smear:   Last 3 Pap and HPV Results:   PAP / HPV Latest Ref Rng & Units 5/13/2019 3/27/2017 8/14/2014   PAP - NIL NIL NIL   HPV 16 DNA NEG:Negative Negative Negative -   HPV 18 DNA NEG:Negative Negative Negative -   OTHER HR HPV NEG:Negative Positive(A) Positive(A) -     PAP / HPV Latest Ref Rng & Units 5/13/2019 3/27/2017 8/14/2014   PAP - NIL NIL NIL   HPV 16 DNA NEG:Negative Negative Negative -   HPV 18 DNA NEG:Negative Negative Negative -   OTHER HR HPV NEG:Negative Positive(A) Positive(A) -     Reviewed and updated as needed this visit by clinical staff  Tobacco  Allergies  Meds   Med Hx  Surg Hx  Fam Hx  Soc Hx        Reviewed and updated as needed this visit by Provider               She thinks she may have had covid-19 in the past.  She had changes with taste and smell, the taste changes have persisted.   She c/o brain fog and isn't sure if this is related to potentially that previous infection.   She has tried to limit alcohol, that has not helped with the brain fog.   She does try to keep her mind active with brain puzzles.  She denies any issues with long term memory.  She denies forgetting people's names, getting lost while driving, or leaving the  "stove on while cooking.       In the 90's she had issues with her left knee s/p reconstructive knee surgery (meniscus tear). She has been feeling instability in her left knee that has been worsening over the past year.  She has a quick, sharp pain in her knee that comes and goes.   She loves to dance and it is important for her to be able to continue with this.           ROS:  CONSTITUTIONAL: NEGATIVE for fever, chills, change in weight  INTEGUMENTARY/SKIN: NEGATIVE for worrisome rashes, moles or lesions  EYES: NEGATIVE for vision changes or irritation  ENT: NEGATIVE for ear, mouth and throat problems  RESP: NEGATIVE for significant cough or SOB  BREAST: NEGATIVE for masses, tenderness or discharge  CV: NEGATIVE for chest pain, palpitations or peripheral edema  GI: NEGATIVE for nausea, abdominal pain, heartburn, or change in bowel habits  : NEGATIVE for unusual urinary or vaginal symptoms. No vaginal bleeding.  MUSCULOSKELETAL: NEGATIVE for significant arthralgias or myalgia  NEURO: NEGATIVE for weakness, dizziness or paresthesias  PSYCHIATRIC: NEGATIVE for changes in mood or affect     OBJECTIVE:   /78   Pulse 74   Temp 97.9  F (36.6  C) (Tympanic)   Resp 16   Ht 1.573 m (5' 1.93\")   Wt 95.5 kg (210 lb 8 oz)   SpO2 99%   BMI 38.59 kg/m    EXAM:  GENERAL APPEARANCE: healthy, alert and no distress  EYES: Eyes grossly normal to inspection, PERRL and conjunctivae and sclerae normal  HENT: ear canals and TM's normal, nose and mouth without ulcers or lesions, oropharynx clear and oral mucous membranes moist  NECK: no adenopathy, no asymmetry, masses, or scars and thyroid normal to palpation  RESP: lungs clear to auscultation - no rales, rhonchi or wheezes  BREAST: normal without masses, tenderness or nipple discharge and no palpable axillary masses or adenopathy  CV: regular rate and rhythm, normal S1 S2, no S3 or S4, no murmur, click or rub, no peripheral edema and peripheral pulses strong  ABDOMEN: soft, " nontender, no hepatosplenomegaly, no masses and bowel sounds normal  MS: no musculoskeletal defects are noted and gait is age appropriate without ataxia, left knee in knee sleeve.  Swelling noted over left knee.  Gait, normal.   Genitalia:  Normal external female genitalia, no lesions noted.  Vagina and Cervix without discharge or lesions.  No adnexal or uterine masses noted.  No cervical motion tenderness noted.    SKIN: no suspicious lesions or rashes  NEURO: Normal strength and tone, sensory exam grossly normal, mentation intact and speech normal  PSYCH: mentation appears normal and affect normal/bright    Diagnostic Test Results:  Labs reviewed in Epic  Results for orders placed or performed in visit on 05/24/21 (from the past 24 hour(s))   Albumin Random Urine Quantitative with Creat Ratio   Result Value Ref Range    Creatinine Urine 227 mg/dL    Albumin Urine mg/L 13 mg/L    Albumin Urine mg/g Cr 5.64 0 - 25 mg/g Cr       ASSESSMENT/PLAN:   1. Routine general medical examination at a health care facility       HEALTH CARE MAINTENANCE              Reviewed USPTF recommendations and anticipatory guidance.              See orders.      Reassured Cammie that there are no red flags in relation to the brain fog she is experiencing.  Will monitor for now.     2. Hypertension goal BP (blood pressure) < 140/90  BP at goal, labs current.  Will leave as is.   - amLODIPine (NORVASC) 10 MG tablet; Take 1 tablet (10 mg) by mouth daily  Dispense: 90 tablet; Refill: 1  - chlorthalidone (HYGROTON) 25 MG tablet; Take 1 tablet (25 mg) by mouth daily  Dispense: 90 tablet; Refill: 1  - metoprolol succinate ER (TOPROL-XL) 100 MG 24 hr tablet; Take 1 tablet (100 mg) by mouth daily  Dispense: 90 tablet; Refill: 1  - Albumin Random Urine Quantitative with Creat Ratio    3. Cervical high risk HPV (human papillomavirus) test positive  Last pap abnormal with positive HPV.  Colposcopy done 6/17/2019 and normal, told to f/u in 1 year.  F/u  "has been delayed due to covid-19 pandemic.     4. Screening for cervical cancer  Due to recheck pap.   - Pap imaged thin layer screen with HPV - recommended age 30 - 65  - HPV High Risk Types DNA Cervical    5. Morbid obesity (H)/Prediabetes  Discussed healthy eating recommendations.  Limit carbs and plenty of fruits and veggies advised.  Reviewed recent labs which show prediabetes.  We reviewed high glycemic index fruits (as she eats a banana daily).     6. Primary osteoarthritis of left knee  Xray from 7/2018:     HISTORY: Left knee internal derangement.                                                                      IMPRESSION: Left knee anterior cruciate ligament reconstruction  interference screws are seen. Severe tricompartmental osteoarthritis  is noted with complete loss of joint space in the medial compartment.  No fractures are seen in the left knee.    Further evaluation with MRI     7. S/P medial meniscus repair of left knee  Further evaluation with MRI indicated for likely surgical intervention in near future.   - Orthopedic & Spine  Referral; Future  - MR Knee Left w/o & w Contrast; Future    8. Knee instability, left  Wearing a compression brace.  Further evaluation with MRI      Patient has been advised of split billing requirements and indicates understanding: Yes  COUNSELING:   Reviewed preventive health counseling, as reflected in patient instructions       Regular exercise       Healthy diet/nutrition    Estimated body mass index is 38.59 kg/m  as calculated from the following:    Height as of this encounter: 1.573 m (5' 1.93\").    Weight as of this encounter: 95.5 kg (210 lb 8 oz).    Weight management plan: Discussed healthy diet and exercise guidelines    She reports that she has never smoked. She has never used smokeless tobacco.      Counseling Resources:  ATP IV Guidelines  Pooled Cohorts Equation Calculator  Breast Cancer Risk Calculator  BRCA-Related Cancer Risk Assessment: " FHS-7 Tool  FRAX Risk Assessment  ICSI Preventive Guidelines  Dietary Guidelines for Americans, 2010  USDA's MyPlate  ASA Prophylaxis  Lung CA Screening    Aleida Carrera PA-C  [facility]

## 2021-05-24 ENCOUNTER — OFFICE VISIT (OUTPATIENT)
Dept: FAMILY MEDICINE | Facility: CLINIC | Age: 61
End: 2021-05-24
Payer: COMMERCIAL

## 2021-05-24 VITALS
WEIGHT: 203.8 LBS | OXYGEN SATURATION: 97 % | SYSTOLIC BLOOD PRESSURE: 122 MMHG | TEMPERATURE: 97.9 F | HEIGHT: 62 IN | HEART RATE: 69 BPM | DIASTOLIC BLOOD PRESSURE: 71 MMHG | BODY MASS INDEX: 37.5 KG/M2

## 2021-05-24 DIAGNOSIS — R73.03 PREDIABETES: ICD-10-CM

## 2021-05-24 DIAGNOSIS — M25.362 KNEE INSTABILITY, LEFT: ICD-10-CM

## 2021-05-24 DIAGNOSIS — Z00.00 ROUTINE GENERAL MEDICAL EXAMINATION AT A HEALTH CARE FACILITY: Primary | ICD-10-CM

## 2021-05-24 DIAGNOSIS — Z98.890 S/P MEDIAL MENISCUS REPAIR OF LEFT KNEE: ICD-10-CM

## 2021-05-24 DIAGNOSIS — R87.810 CERVICAL HIGH RISK HPV (HUMAN PAPILLOMAVIRUS) TEST POSITIVE: ICD-10-CM

## 2021-05-24 DIAGNOSIS — I10 HYPERTENSION GOAL BP (BLOOD PRESSURE) < 140/90: ICD-10-CM

## 2021-05-24 DIAGNOSIS — Z12.4 SCREENING FOR CERVICAL CANCER: ICD-10-CM

## 2021-05-24 DIAGNOSIS — M17.12 PRIMARY OSTEOARTHRITIS OF LEFT KNEE: ICD-10-CM

## 2021-05-24 DIAGNOSIS — E66.01 MORBID OBESITY (H): ICD-10-CM

## 2021-05-24 LAB
CREAT UR-MCNC: 227 MG/DL
MICROALBUMIN UR-MCNC: 13 MG/L
MICROALBUMIN/CREAT UR: 5.64 MG/G CR (ref 0–25)

## 2021-05-24 PROCEDURE — 87624 HPV HI-RISK TYP POOLED RSLT: CPT | Performed by: PHYSICIAN ASSISTANT

## 2021-05-24 PROCEDURE — G0145 SCR C/V CYTO,THINLAYER,RESCR: HCPCS | Performed by: PHYSICIAN ASSISTANT

## 2021-05-24 PROCEDURE — 99396 PREV VISIT EST AGE 40-64: CPT | Performed by: PHYSICIAN ASSISTANT

## 2021-05-24 PROCEDURE — 82043 UR ALBUMIN QUANTITATIVE: CPT | Performed by: PHYSICIAN ASSISTANT

## 2021-05-24 PROCEDURE — 99214 OFFICE O/P EST MOD 30 MIN: CPT | Mod: 25 | Performed by: PHYSICIAN ASSISTANT

## 2021-05-24 RX ORDER — METOPROLOL SUCCINATE 100 MG/1
100 TABLET, EXTENDED RELEASE ORAL DAILY
Qty: 90 TABLET | Refills: 1 | Status: SHIPPED | OUTPATIENT
Start: 2021-05-24 | End: 2022-01-31

## 2021-05-24 RX ORDER — CHLORTHALIDONE 25 MG/1
25 TABLET ORAL DAILY
Qty: 90 TABLET | Refills: 1 | Status: SHIPPED | OUTPATIENT
Start: 2021-05-24 | End: 2022-01-31

## 2021-05-24 RX ORDER — AMLODIPINE BESYLATE 10 MG/1
10 TABLET ORAL DAILY
Qty: 90 TABLET | Refills: 1 | Status: SHIPPED | OUTPATIENT
Start: 2021-05-24 | End: 2022-01-31

## 2021-05-24 ASSESSMENT — MIFFLIN-ST. JEOR: SCORE: 1427.19

## 2021-05-24 NOTE — LETTER
May 26, 2021      Caty Llamas  [address] [place] [address]        Dear ,    We are writing to inform you of your test results.    Your urine test was normal. This is a screening test used to detect microscopic protein in the urine - which would be an early sign of damage to the kidneys brought on by hypertension and/or diabetes. We will follow this on an annual basis.     Resulted Orders   Albumin Random Urine Quantitative with Creat Ratio   Result Value Ref Range    Creatinine Urine 227 mg/dL    Albumin Urine mg/L 13 mg/L    Albumin Urine mg/g Cr 5.64 0 - 25 mg/g Cr       If you have any questions or concerns, please call the clinic at the number listed above.       Sincerely,      Aleida Carrera PA-C

## 2021-05-26 LAB
COPATH REPORT: NORMAL
PAP: NORMAL

## 2021-05-28 LAB
FINAL DIAGNOSIS: NORMAL
HPV HR 12 DNA CVX QL NAA+PROBE: NEGATIVE
HPV16 DNA SPEC QL NAA+PROBE: NEGATIVE
HPV18 DNA SPEC QL NAA+PROBE: NEGATIVE
SPECIMEN DESCRIPTION: NORMAL
SPECIMEN SOURCE CVX/VAG CYTO: NORMAL

## 2021-06-22 DIAGNOSIS — M25.562 LEFT KNEE PAIN: Primary | ICD-10-CM

## 2021-07-01 ENCOUNTER — OFFICE VISIT (OUTPATIENT)
Dept: ORTHOPEDICS | Facility: CLINIC | Age: 61
End: 2021-07-01
Attending: PHYSICIAN ASSISTANT
Payer: COMMERCIAL

## 2021-07-01 ENCOUNTER — ANCILLARY PROCEDURE (OUTPATIENT)
Dept: GENERAL RADIOLOGY | Facility: CLINIC | Age: 61
End: 2021-07-01
Attending: ORTHOPAEDIC SURGERY
Payer: COMMERCIAL

## 2021-07-01 VITALS — BODY MASS INDEX: 37.49 KG/M2 | WEIGHT: 203.71 LBS | HEIGHT: 62 IN

## 2021-07-01 DIAGNOSIS — Z98.890 S/P MEDIAL MENISCUS REPAIR OF LEFT KNEE: ICD-10-CM

## 2021-07-01 PROCEDURE — 99203 OFFICE O/P NEW LOW 30 MIN: CPT | Performed by: ORTHOPAEDIC SURGERY

## 2021-07-01 PROCEDURE — 73562 X-RAY EXAM OF KNEE 3: CPT | Mod: LT | Performed by: RADIOLOGY

## 2021-07-01 ASSESSMENT — KOOS JR
KOOS JR SCORING: 91.98
BENDING TO THE FLOOR TO PICK UP OBJECT: MILD

## 2021-07-01 ASSESSMENT — MIFFLIN-ST. JEOR: SCORE: 1426.76

## 2021-07-01 NOTE — NURSING NOTE
"Reason For Visit:   Chief Complaint   Patient presents with     Consult     Left knee pain.  Ref. Aleida Carrera PA-C S/P medial meniscus repair, loose body removal and, ACL thermal shrinkage, and with Dr. Maxwell DOS: 1/26/2009 with 2 other surgeries prior       Ht 1.566 m (5' 1.65\")   Wt 92.4 kg (203 lb 11.3 oz)   BMI 37.68 kg/m      Pain Assessment  Patient Currently in Pain: [place]    Connie Mckeon ATC    "

## 2021-07-01 NOTE — LETTER
7/1/2021         RE: Caty Llamas  [address] [place] [address]        Dear Colleague,    Thank you for referring your patient, Caty Llamas, to the [facility] [place]. Please see a copy of my visit note below.    Assessment: This is a 61 year old with advanced left knee osteoarthritis. She reports that she is not interested in intervention. Wants to see radiographs to see state of the joint which we reviewed. Discussed the diagnosis and the treatment options including living with it and total knee.     Plan: PRN.  Very happy to see back.    Chief Complaint: No chief complaint on file.    Physician:  Aleida Carrera    HPI: Caty Llamas is a 61 year old female who presents today for evaluation of    Symptom Profile  Location of symptoms:  Medial and posterior knee   Onset: insidious   Trend: getting a little soraya r  Duration of symptoms:  Quality of symptoms: aching, sharp/stabbing  Severity: mild   Alleviate: activity modification, recent weight loss    Exacerbating: activities, dancing, pivoting  Previous Treatments: Previous treatments include activity modification, oral pain medication    DERRICK Machuca: 91.88  Golfs, walks about 1 mile a day  Limitations on hiking and dancing due to knee pain    MEDICAL HISTORY:   Past Medical History:   Diagnosis Date     Abnormal Pap smear of cervix 2011    see problem list     CARDIOVASCULAR SCREENING; LDL GOAL LESS THAN 160 5/9/2010     Cervical high risk HPV (human papillomavirus) test positive 03/27/2017, 2019    see problem list     Hx of colposcopy with cervical biopsy 2011    wnl     Hypertension goal BP (blood pressure) < 140/90 12/5/2011     Status post colposcopy 05/02/2017    ECC - negative for dysplasia       Medications:     Current Outpatient Medications:      albuterol (PROAIR HFA/PROVENTIL HFA/VENTOLIN HFA) 108 (90 Base) MCG/ACT inhaler, Inhale 1-2 puffs into the lungs every 4 hours as needed for  shortness of breath / dyspnea or wheezing (Patient not taking: Reported on 5/24/2021), Disp: 9 g, Rfl: 1     amLODIPine (NORVASC) 10 MG tablet, Take 1 tablet (10 mg) by mouth daily, Disp: 90 tablet, Rfl: 1     chlorthalidone (HYGROTON) 25 MG tablet, Take 1 tablet (25 mg) by mouth daily, Disp: 90 tablet, Rfl: 1     metoprolol succinate ER (TOPROL-XL) 100 MG 24 hr tablet, Take 1 tablet (100 mg) by mouth daily, Disp: 90 tablet, Rfl: 1     polyethylene glycol (MIRALAX) 17 GM/Dose powder, Take 17 g by mouth daily as needed for constipation (Patient not taking: Reported on 5/24/2021), Disp: 510 g, Rfl: 0    Allergies: Butalbital-aspirin-caffeine and Salicylates    SURGICAL HISTORY:   Past Surgical History:   Procedure Laterality Date     ARTHROSCOPY KNEE RT/LT  1988, 1994    left knee       FAMILY HISTORY:   Family History   Problem Relation Age of Onset     C.A.D. Father      Cerebrovascular Disease Father      Cancer Father      Diabetes Sister      Hypertension Sister      Diabetes Brother      Diabetes Sister      Hypertension Sister        SOCIAL HISTORY:   Social History     Tobacco Use     Smoking status: Never Smoker     Smokeless tobacco: Never Used     Tobacco comment: smoke every blue moon   Substance Use Topics     Alcohol use: Yes     Comment: very seldom       REVIEW OF SYSTEMS:  The comprehensive review of systems from the intake form was reviewed with the patient.  No fever, weight change or fatigue. No dry eyes. No oral ulcers, sore throat or voice change. No palpitations, syncope, angina or edema.  No chest pain, excessive sleepiness, shortness of breath or hemoptysis.   No abdominal pain, nausea, vomiting, diarrhea or heartburn.  No skin rash. No focal weakness or numbness. No bleeding or lymphadenopathy. No rhinitis or hives.     Exam:  On physical examination the patient appears the stated age, is in no acute distress, affectThe is appropriate, and breathing is non-labored.  Vitals are documented in  the EMR and have been reviewed:    There were no vitals taken for this visit.  Data Unavailable  There is no height or weight on file to calculate BMI.    Rises from chair: easily   Gait:varus almost varus thrust gait   Gains the exam table: with effort   Left Knee  Appearance: benign  Clinical alignment: varus and not correctable  Effusion:mld   Tenderness to palpation: medial greater than lateral joint line   Extension: 15  Flexion: 100  Collateral ligaments: intact  Cruciate ligaments: grossly intact     Right Knee  Appearance: benign  Clinical alignment: neutral   Effusion: no  Tenderness to palpation: no  Extension:0  Flexion: 110  Collateral ligaments: intact  Cruciate ligaments: grossly intact     Distally, the circulatory, motor, and sensation exam is intact with 5/5 EHL, gastroc-soleus, and tibialis anterior.  Sensation to light touch is intact.  Dorsalis pedis and posterior tibialis pulses are palpable.  There are no sores on the feet, no bruising, and no lymphedema.    X-rays:   End stage OA left knee, tricompartmental, varus       Harinder Lara MD

## 2021-07-01 NOTE — PROGRESS NOTES
Assessment: This is a 61 year old with advanced left knee osteoarthritis. She reports that she is not interested in intervention. Wants to see radiographs to see state of the joint which we reviewed. Discussed the diagnosis and the treatment options including living with it and total knee.     Plan: PRN.  Very happy to see back.    Chief Complaint: No chief complaint on file.    Physician:  Aleida Carrera    HPI: Caty Llamas is a 61 year old female who presents today for evaluation of    Symptom Profile  Location of symptoms:  Medial and posterior knee   Onset: insidious   Trend: getting a little soraya r  Duration of symptoms:  Quality of symptoms: aching, sharp/stabbing  Severity: mild   Alleviate: activity modification, recent weight loss    Exacerbating: activities, dancing, pivoting  Previous Treatments: Previous treatments include activity modification, oral pain medication    DERRICK Machuca: 91.88  Golfs, walks about 1 mile a day  Limitations on hiking and dancing due to knee pain    MEDICAL HISTORY:   Past Medical History:   Diagnosis Date     Abnormal Pap smear of cervix 2011    see problem list     CARDIOVASCULAR SCREENING; LDL GOAL LESS THAN 160 5/9/2010     Cervical high risk HPV (human papillomavirus) test positive 03/27/2017, 2019    see problem list     Hx of colposcopy with cervical biopsy 2011    wnl     Hypertension goal BP (blood pressure) < 140/90 12/5/2011     Status post colposcopy 05/02/2017    ECC - negative for dysplasia       Medications:     Current Outpatient Medications:      albuterol (PROAIR HFA/PROVENTIL HFA/VENTOLIN HFA) 108 (90 Base) MCG/ACT inhaler, Inhale 1-2 puffs into the lungs every 4 hours as needed for shortness of breath / dyspnea or wheezing (Patient not taking: Reported on 5/24/2021), Disp: 9 g, Rfl: 1     amLODIPine (NORVASC) 10 MG tablet, Take 1 tablet (10 mg) by mouth daily, Disp: 90 tablet, Rfl: 1     chlorthalidone (HYGROTON) 25 MG tablet, Take 1 tablet (25 mg) by  mouth daily, Disp: 90 tablet, Rfl: 1     metoprolol succinate ER (TOPROL-XL) 100 MG 24 hr tablet, Take 1 tablet (100 mg) by mouth daily, Disp: 90 tablet, Rfl: 1     polyethylene glycol (MIRALAX) 17 GM/Dose powder, Take 17 g by mouth daily as needed for constipation (Patient not taking: Reported on 5/24/2021), Disp: 510 g, Rfl: 0    Allergies: Butalbital-aspirin-caffeine and Salicylates    SURGICAL HISTORY:   Past Surgical History:   Procedure Laterality Date     ARTHROSCOPY KNEE RT/LT  1988, 1994    left knee       FAMILY HISTORY:   Family History   Problem Relation Age of Onset     C.A.D. Father      Cerebrovascular Disease Father      Cancer Father      Diabetes Sister      Hypertension Sister      Diabetes Brother      Diabetes Sister      Hypertension Sister        SOCIAL HISTORY:   Social History     Tobacco Use     Smoking status: Never Smoker     Smokeless tobacco: Never Used     Tobacco comment: smoke every blue moon   Substance Use Topics     Alcohol use: Yes     Comment: very seldom       REVIEW OF SYSTEMS:  The comprehensive review of systems from the intake form was reviewed with the patient.  No fever, weight change or fatigue. No dry eyes. No oral ulcers, sore throat or voice change. No palpitations, syncope, angina or edema.  No chest pain, excessive sleepiness, shortness of breath or hemoptysis.   No abdominal pain, nausea, vomiting, diarrhea or heartburn.  No skin rash. No focal weakness or numbness. No bleeding or lymphadenopathy. No rhinitis or hives.     Exam:  On physical examination the patient appears the stated age, is in no acute distress, affectThe is appropriate, and breathing is non-labored.  Vitals are documented in the EMR and have been reviewed:    There were no vitals taken for this visit.  Data Unavailable  There is no height or weight on file to calculate BMI.    Rises from chair: easily   Gait:varus almost varus thrust gait   Gains the exam table: with effort   Left  Knee  Appearance: benign  Clinical alignment: varus and not correctable  Effusion:mld   Tenderness to palpation: medial greater than lateral joint line   Extension: 15  Flexion: 100  Collateral ligaments: intact  Cruciate ligaments: grossly intact     Right Knee  Appearance: benign  Clinical alignment: neutral   Effusion: no  Tenderness to palpation: no  Extension:0  Flexion: 110  Collateral ligaments: intact  Cruciate ligaments: grossly intact     Distally, the circulatory, motor, and sensation exam is intact with 5/5 EHL, gastroc-soleus, and tibialis anterior.  Sensation to light touch is intact.  Dorsalis pedis and posterior tibialis pulses are palpable.  There are no sores on the feet, no bruising, and no lymphedema.    X-rays:   End stage OA left knee, tricompartmental, varus

## 2021-12-20 ENCOUNTER — IMMUNIZATION (OUTPATIENT)
Dept: PEDIATRICS | Facility: OTHER | Age: 61
End: 2021-12-20
Payer: COMMERCIAL

## 2021-12-20 PROCEDURE — 0003A PR COVID VAC PFIZER DIL RECON 30 MCG/0.3 ML IM: CPT

## 2021-12-20 PROCEDURE — 91300 PR COVID VAC PFIZER DIL RECON 30 MCG/0.3 ML IM: CPT

## 2022-01-29 DIAGNOSIS — I10 HYPERTENSION GOAL BP (BLOOD PRESSURE) < 140/90: ICD-10-CM

## 2022-01-29 NOTE — TELEPHONE ENCOUNTER
Reason for Call:  Other prescription    Detailed comments: patient will be out of medication in 1 week:    1)  Amloditine  2)  Metoprolol  3)  Chlorthalidon.    Would like to pickup at [facility] [place].  Please contact patient.  Thank you.    Phone Number Patient can be reached at: Home number on file [phone] (home)    Best Time: any    Can we leave a detailed message on this number? YES    Call taken on 1/29/2022 at 9:51 AM by Marlena Cunningham

## 2022-01-30 DIAGNOSIS — I10 HYPERTENSION GOAL BP (BLOOD PRESSURE) < 140/90: ICD-10-CM

## 2022-01-31 RX ORDER — METOPROLOL SUCCINATE 100 MG/1
100 TABLET, EXTENDED RELEASE ORAL DAILY
Qty: 90 TABLET | Refills: 0 | Status: SHIPPED | OUTPATIENT
Start: 2022-01-31 | End: 2022-05-05

## 2022-01-31 RX ORDER — CHLORTHALIDONE 25 MG/1
25 TABLET ORAL DAILY
Qty: 90 TABLET | Refills: 0 | Status: SHIPPED | OUTPATIENT
Start: 2022-01-31 | End: 2022-06-06

## 2022-01-31 RX ORDER — AMLODIPINE BESYLATE 10 MG/1
10 TABLET ORAL DAILY
Qty: 90 TABLET | Refills: 0 | Status: SHIPPED | OUTPATIENT
Start: 2022-01-31 | End: 2022-05-05

## 2022-01-31 NOTE — TELEPHONE ENCOUNTER
Medication is being filled for 1 time refill only due to:  Patient needs to be seen because need appt.    Please call to to inform refills sent and schedule appt-  Return in about 6 months (around 11/24/2021) for med recheck.

## 2022-02-01 RX ORDER — METOPROLOL SUCCINATE 100 MG/1
TABLET, EXTENDED RELEASE ORAL
Qty: 90 TABLET | Refills: 1 | OUTPATIENT
Start: 2022-02-01

## 2022-02-01 RX ORDER — AMLODIPINE BESYLATE 10 MG/1
TABLET ORAL
Qty: 90 TABLET | Refills: 1 | OUTPATIENT
Start: 2022-02-01

## 2022-05-09 ENCOUNTER — TELEPHONE (OUTPATIENT)
Dept: FAMILY MEDICINE | Facility: CLINIC | Age: 62
End: 2022-05-09
Payer: COMMERCIAL

## 2022-05-09 NOTE — TELEPHONE ENCOUNTER
Refills sent in 5/5/22. Patient has a week left so will get to her appointment.    Aleida Mata RN

## 2022-05-31 DIAGNOSIS — I10 HYPERTENSION GOAL BP (BLOOD PRESSURE) < 140/90: ICD-10-CM

## 2022-06-02 RX ORDER — METOPROLOL SUCCINATE 100 MG/1
100 TABLET, EXTENDED RELEASE ORAL DAILY
Qty: 30 TABLET | Refills: 0 | Status: SHIPPED | OUTPATIENT
Start: 2022-06-02 | End: 2022-06-06

## 2022-06-02 RX ORDER — AMLODIPINE BESYLATE 10 MG/1
10 TABLET ORAL DAILY
Qty: 30 TABLET | Refills: 0 | Status: SHIPPED | OUTPATIENT
Start: 2022-06-02 | End: 2022-06-06

## 2022-06-06 ENCOUNTER — TELEPHONE (OUTPATIENT)
Dept: OTHER | Facility: CLINIC | Age: 62
End: 2022-06-06

## 2022-06-06 ENCOUNTER — OFFICE VISIT (OUTPATIENT)
Dept: FAMILY MEDICINE | Facility: CLINIC | Age: 62
End: 2022-06-06
Payer: COMMERCIAL

## 2022-06-06 VITALS
SYSTOLIC BLOOD PRESSURE: 124 MMHG | DIASTOLIC BLOOD PRESSURE: 72 MMHG | HEART RATE: 70 BPM | RESPIRATION RATE: 18 BRPM | TEMPERATURE: 97.6 F | HEIGHT: 62 IN | WEIGHT: 203 LBS | BODY MASS INDEX: 37.36 KG/M2 | OXYGEN SATURATION: 98 %

## 2022-06-06 DIAGNOSIS — M19.011 PRIMARY OSTEOARTHRITIS OF RIGHT SHOULDER: ICD-10-CM

## 2022-06-06 DIAGNOSIS — M19.041 PRIMARY OSTEOARTHRITIS OF BOTH HANDS: ICD-10-CM

## 2022-06-06 DIAGNOSIS — M19.042 PRIMARY OSTEOARTHRITIS OF BOTH HANDS: ICD-10-CM

## 2022-06-06 DIAGNOSIS — L65.9 HAIR THINNING: ICD-10-CM

## 2022-06-06 DIAGNOSIS — I87.2 STASIS DERMATITIS OF BOTH LEGS: ICD-10-CM

## 2022-06-06 DIAGNOSIS — Z12.11 SCREEN FOR COLON CANCER: ICD-10-CM

## 2022-06-06 DIAGNOSIS — Z12.31 VISIT FOR SCREENING MAMMOGRAM: ICD-10-CM

## 2022-06-06 DIAGNOSIS — E66.01 MORBID OBESITY (H): ICD-10-CM

## 2022-06-06 DIAGNOSIS — E87.6 HYPOKALEMIA: ICD-10-CM

## 2022-06-06 DIAGNOSIS — M65.30 TRIGGER FINGER, ACQUIRED: ICD-10-CM

## 2022-06-06 DIAGNOSIS — Z00.00 ROUTINE GENERAL MEDICAL EXAMINATION AT A HEALTH CARE FACILITY: Primary | ICD-10-CM

## 2022-06-06 DIAGNOSIS — I10 HYPERTENSION GOAL BP (BLOOD PRESSURE) < 140/90: ICD-10-CM

## 2022-06-06 PROCEDURE — 84443 ASSAY THYROID STIM HORMONE: CPT | Performed by: PHYSICIAN ASSISTANT

## 2022-06-06 PROCEDURE — 82043 UR ALBUMIN QUANTITATIVE: CPT | Performed by: PHYSICIAN ASSISTANT

## 2022-06-06 PROCEDURE — 80048 BASIC METABOLIC PNL TOTAL CA: CPT | Performed by: PHYSICIAN ASSISTANT

## 2022-06-06 PROCEDURE — 99214 OFFICE O/P EST MOD 30 MIN: CPT | Mod: 25 | Performed by: PHYSICIAN ASSISTANT

## 2022-06-06 PROCEDURE — 99396 PREV VISIT EST AGE 40-64: CPT | Performed by: PHYSICIAN ASSISTANT

## 2022-06-06 PROCEDURE — 36415 COLL VENOUS BLD VENIPUNCTURE: CPT | Performed by: PHYSICIAN ASSISTANT

## 2022-06-06 RX ORDER — CHLORTHALIDONE 25 MG/1
12.5 TABLET ORAL DAILY
Qty: 45 TABLET | Refills: 1 | Status: SHIPPED | OUTPATIENT
Start: 2022-06-06 | End: 2023-01-05

## 2022-06-06 RX ORDER — METOPROLOL SUCCINATE 100 MG/1
100 TABLET, EXTENDED RELEASE ORAL DAILY
Qty: 90 TABLET | Refills: 1 | Status: SHIPPED | OUTPATIENT
Start: 2022-06-06 | End: 2023-01-03

## 2022-06-06 RX ORDER — AMLODIPINE BESYLATE 10 MG/1
10 TABLET ORAL DAILY
Qty: 90 TABLET | Refills: 1 | Status: SHIPPED | OUTPATIENT
Start: 2022-06-06 | End: 2023-01-03

## 2022-06-06 ASSESSMENT — ENCOUNTER SYMPTOMS
CHILLS: 0
PALPITATIONS: 0
CONSTIPATION: 0
SORE THROAT: 0
HEADACHES: 0
ARTHRALGIAS: 1
HEARTBURN: 0
ABDOMINAL PAIN: 0
HEMATURIA: 0
DYSURIA: 0
DIARRHEA: 0
EYE PAIN: 0
WEAKNESS: 0
MYALGIAS: 0
NAUSEA: 0
JOINT SWELLING: 1
NERVOUS/ANXIOUS: 0
HEMATOCHEZIA: 0
BREAST MASS: 0
DIZZINESS: 0
FEVER: 0
COUGH: 0
FREQUENCY: 0
SHORTNESS OF BREATH: 0
PARESTHESIAS: 0

## 2022-06-06 ASSESSMENT — ASTHMA QUESTIONNAIRES: ACT_TOTALSCORE: 25

## 2022-06-06 ASSESSMENT — PAIN SCALES - GENERAL: PAINLEVEL: NO PAIN (0)

## 2022-06-06 NOTE — PROGRESS NOTES
SUBJECTIVE:   CC: Caty Llaams is an 62 year old woman who presents for preventive health visit.       Patient has been advised of split billing requirements and indicates understanding: Yes  Healthy Habits:     Getting at least 3 servings of Calcium per day:  Yes    Bi-annual eye exam:  Yes    Dental care twice a year:  NO    Sleep apnea or symptoms of sleep apnea:  None    Diet:  Regular (no restrictions)    Frequency of exercise:  4-5 days/week    Duration of exercise:  30-45 minutes    Taking medications regularly:  Yes    Medication side effects:  None    PHQ-2 Total Score: 0    Additional concerns today:  Yes    Patient has concerns with bilateral lower leg swelling and discoloration. Has been ongoing for a couple years and pt recently started using compression stockings which have helped with swelling, color and discomfort.  Other family members have similar issues and someone in her family had a procedure that was done and helped with this.  She also had an aunt who had a blood clot that lead to a stroke and she is worried this may happen.  She denies any pain or swelling behind the knees or calves.  Symptoms have been present for years and improving with compression stockings.        C/o bilateral hand pain for years.  She feels stiffness and will sometimes have her right middle digit will lock at times.  She does a lot of typing (for years).  Her left thumb will also have more pain (when opening a pickle jar).  Feels a snapping/grinding sensation at the base of her left hand. No numbness or tingling in hands.   No meds tried (she tried to avoid medications).         She c/o thinning hair for a few months.  Has tried a biotin shampoo but that hasn't done much.  She denies any other changes in skin, hair or nails.     She c/o right shoulder pain that has been present for years.  She has good ROM.           Hypertension Follow-up      Do you check your blood pressure regularly outside of the clinic? No      Are you following a low salt diet? Yes    Are your blood pressures ever more than 140 on the top number (systolic) OR more   than 90 on the bottom number (diastolic), for example 140/90? No      Today's PHQ-2 Score:   PHQ-2 ( 1999 Pf[employer]) 6/6/2022   Q1: Little interest or pleasure in doing things 0   Q2: Feeling down, depressed or hopeless 0   PHQ-2 Score 0   PHQ-2 Total Score (12-17 Years)- Positive if 3 or more points; Administer PHQ-A if positive -   Q1: Little interest or pleasure in doing things Not at all   Q2: Feeling down, depressed or hopeless Not at all   PHQ-2 Score 0       Abuse: Current or Past (Physical, Sexual or Emotional) - No  Do you feel safe in your environment? Yes        Social History     Tobacco Use     Smoking status: Never Smoker     Smokeless tobacco: Never Used     Tobacco comment: smoke every blue moon   Substance Use Topics     Alcohol use: Yes     Comment: very seldom         Alcohol Use 6/6/2022   Prescreen: >3 drinks/day or >7 drinks/week? No   Prescreen: >3 drinks/day or >7 drinks/week? -       Reviewed orders with patient.  Reviewed health maintenance and updated orders accordingly - Yes  Lab work is in process  Labs reviewed in EPIC  BP Readings from Last 3 Encounters:   06/06/22 124/72   05/24/21 122/71   05/17/21 126/78    Wt Readings from Last 3 Encounters:   06/06/22 92.1 kg (203 lb)   07/01/21 92.4 kg (203 lb 11.3 oz)   05/24/21 92.4 kg (203 lb 12.8 oz)                    Breast Cancer Screening:  Any new diagnosis of family breast, ovarian, or bowel cancer? No    FHS-7: No flowsheet data found.    Mammogram Screening: Recommended annual mammography  Pertinent mammograms are reviewed under the imaging tab.    History of abnormal Pap smear: NO - age 30-65 PAP every 5 years with negative HPV co-testing recommended  PAP / HPV Latest Ref Rng & Units 5/24/2021 5/13/2019 3/27/2017   PAP (Historical) - NIL NIL NIL   HPV16 NEG:Negative Negative Negative Negative   HPV18  NEG:Negative Negative Negative Negative   HRHPV NEG:Negative Negative Positive(A) Positive(A)     Reviewed and updated as needed this visit by clinical staff   Tobacco  Allergies  Meds                Reviewed and updated as needed this visit by Provider                       Review of Systems   Constitutional: Negative for chills and fever.   HENT: Negative for congestion, ear pain, hearing loss and sore throat.    Eyes: Negative for pain and visual disturbance.   Respiratory: Negative for cough and shortness of breath.    Cardiovascular: Negative for chest pain, palpitations and peripheral edema.   Gastrointestinal: Negative for abdominal pain, constipation, diarrhea, heartburn, hematochezia and nausea.   Breasts:  Negative for tenderness, breast mass and discharge.   Genitourinary: Negative for dysuria, frequency, genital sores, hematuria, pelvic pain, urgency, vaginal bleeding and vaginal discharge.   Musculoskeletal: Positive for arthralgias and joint swelling. Negative for myalgias.   Skin: Negative for rash.   Neurological: Negative for dizziness, weakness, headaches and paresthesias.   Psychiatric/Behavioral: Negative for mood changes. The patient is not nervous/anxious.      CONSTITUTIONAL: NEGATIVE for fever, chills, change in weight  INTEGUMENTARY/SKIN: NEGATIVE for worrisome rashes, moles or lesions  EYES: NEGATIVE for vision changes or irritation  ENT: NEGATIVE for ear, mouth and throat problems  RESP: NEGATIVE for significant cough or SOB  BREAST: NEGATIVE for masses, tenderness or discharge  CV: NEGATIVE for chest pain, palpitations or peripheral edema  GI: NEGATIVE for nausea, abdominal pain, heartburn, or change in bowel habits  : NEGATIVE for unusual urinary or vaginal symptoms. No vaginal bleeding.  MUSCULOSKELETAL: NEGATIVE for significant arthralgias or myalgia  NEURO: NEGATIVE for weakness, dizziness or paresthesias  PSYCHIATRIC: NEGATIVE for changes in mood or affect      OBJECTIVE:   BP  "124/72 (BP Location: Left arm, Patient Position: Chair, Cuff Size: Adult Large)   Pulse 70   Temp 97.6  F (36.4  C) (Tympanic)   Resp 18   Ht 1.575 m (5' 2\")   Wt 92.1 kg (203 lb)   LMP  (LMP Unknown)   SpO2 98%   Breastfeeding No   BMI 37.13 kg/m    Physical Exam  GENERAL APPEARANCE: healthy, alert and no distress  EYES: Eyes grossly normal to inspection, PERRL and conjunctivae and sclerae normal  HENT: ear canals and TM's normal, nose and mouth without ulcers or lesions, oropharynx clear and oral mucous membranes moist  NECK: no adenopathy, no asymmetry, masses, or scars and thyroid normal to palpation  RESP: lungs clear to auscultation - no rales, rhonchi or wheezes  BREAST: normal without masses, tenderness or nipple discharge and no palpable axillary masses or adenopathy  CV: regular rate and rhythm, normal S1 S2, no S3 or S4, no murmur, click or rub, no peripheral edema and peripheral pulses strong  ABDOMEN: soft, nontender, no hepatosplenomegaly, no masses and bowel sounds normal  MS: no musculoskeletal defects are noted and gait is age appropriate without ataxia  Hands:  hands are placed in the palms up position and clicking noted over right 3rd digit with active flexion and extension.  3rd digit locks while making a fist and is slowly massaged to release.   Swelling negative  Tenderness negative  Shoulders:   Inspection: No obvious deformity, asymmetry, muscle atrophy or abnormal motion noted.   Palpation:  No pain over AC or SC joint, acromion and subacromial space, bicipital groove and greater/less tubercles, scapular spine and adjacent musculature, cervical spine.    External/Internal rotation strength:  full  Abduction/Adduction strength: full, crepitus noted  Biceps/Triceps strength: normal  Neck examination: normal  ROM/crepitus? Yes over right shoulder with ROM  Capillary refills less than 2 seconds and radial pulses normal bilaterally.   Sensation intact bilateral fingers, hands and " arms.  X-ray: not indicated.  SKIN: No suspicious rash noted.    Lower legs with hyperpigmented rash around ankles and extending up to shin area.  Negative Roshan's sign bilaterally. No calf or popliteal pain.  Posterior Tibialis and dorsalis pedis pulses intact bilaterally.  Capillary refill less than 2 seconds bilateral lower extremities.    NEURO: Normal strength and tone, sensory exam grossly normal, mentation intact and speech normal  PSYCH: mentation appears normal and affect normal/bright    Diagnostic Test Results:  Labs reviewed in Epic  Results for orders placed or performed in visit on 06/06/22   TSH with free T4 reflex     Status: Normal   Result Value Ref Range    TSH 0.69 0.40 - 4.00 mU/L   Basic metabolic panel  (Ca, Cl, CO2, Creat, Gluc, K, Na, BUN)     Status: Abnormal   Result Value Ref Range    Sodium 139 133 - 144 mmol/L    Potassium 3.2 (L) 3.4 - 5.3 mmol/L    Chloride 103 94 - 109 mmol/L    Carbon Dioxide (CO2) 30 20 - 32 mmol/L    Anion Gap 6 3 - 14 mmol/L    Urea Nitrogen 22 7 - 30 mg/dL    Creatinine 0.94 0.52 - 1.04 mg/dL    Calcium 9.4 8.5 - 10.1 mg/dL    Glucose 96 70 - 99 mg/dL    GFR Estimate 67 >60 mL/min/1.73m2   Albumin Random Urine Quantitative with Creat Ratio     Status: None   Result Value Ref Range    Creatinine Urine mg/dL 61 mg/dL    Albumin Urine mg/L <5 mg/L    Albumin Urine mg/g Cr         ASSESSMENT/PLAN:   (Z00.00) Routine general medical examination at a health care facility  (primary encounter diagnosis)  Comment:      HEALTH CARE MAINTENANCE              Reviewed USPTF recommendations and anticipatory guidance.              See orders.       (Z12.11) Screen for colon cancer  Comment: I discussed the importance of colorectal cancer screening, including the risks and benefits of the various procedures, including colonoscopy, cologuard and annual FOB immunoassay test.  Patient has opted to colonoscopy      Plan: Adult Gastro Ref - Procedure Only            (Z12.31) Visit  for screening mammogram  Comment: I discussed in detail with Cayt Llamas the importance of breast cancer screening through monthly self breast exams and annual breast exams in the clinic.    Plan: MA SCREENING DIGITAL BILAT - Future  (s+30)            (I10) Hypertension goal BP (blood pressure) < 140/90  Comment: BP at goal, she hopes to get off medication if possible.  I suggest we slowly decrease the dose on her medication, however it is doubtful she will be able to get completely off all the medication and keep blood pressures at goal.  Will cut back chorthalidone from 25 mg daily to 12.5 mg daily.    Plan: Albumin Random Urine Quantitative with Creat         Ratio, Basic metabolic panel  (Ca, Cl, CO2,         Creat, Gluc, K, Na, BUN), amLODIPine (NORVASC)         10 MG tablet, chlorthalidone (HYGROTON) 25 MG         tablet, metoprolol succinate ER (TOPROL XL) 100        MG 24 hr tablet            (I87.2) Stasis dermatitis of both legs  Comment: Likely due to chronic venous insufficiency.  Symptoms have been present for years and improving with compression stockings.  She would like to see a specialist for further evaluation of this.   Plan: Vascular Medicine Referral            (E66.01) Morbid obesity (H)  Comment: morbid obesity with co-morbidity (HTN).   Plan: discussed importance of healthy diet and exercise.     (M19.041,  M19.042) Primary osteoarthritis of both hands  Comment: For your hands/finger, I suggest an anti-inflammatory medication such as advil 400 mg twice per day for 7 days (with food).  Then switch to a topical arthritis medication (Australian Dreams, Bengay) to be used PRN.    Plan:     (M65.30) Trigger finger, acquired      Trigger Finger.    I discussed the pathophysiology of this condition as well as treatment options.  I advised her to rest the area (no excessive repetitive movements), apply ice and use anti-inflammatory medications to help reduce inflammation of the tendon.  We did  "discuss the use of steroid injections to help reduce the swelling, patient may f/u with ortho for that if necessary.  Patient education materials given during examination today.    Plan:     (L65.9) Hair thinning  Comment: continue with biotin, thyroid levels normal.   Plan: TSH with free T4 reflex            (M19.011) Primary osteoarthritis of right shoulder  Comment: Discussed importance of maintaining full ROM of shoulder.  May use arthritis cream on right shoulder as well.   May consider physical therapy   Plan:     (E87.6) Hypokalemia  Comment: Your potassium is low, I recommend you increase your intake of potassium rich foods, such as bananas, spinach, broccoli, asparagus, brussel sprouts, and tomatoes.  Let's recheck this level in 2 weeks.  Likely will improve as we are cutting down on chlorthalidone as well.     Plan: Potassium              Patient has been advised of split billing requirements and indicates understanding: Yes    COUNSELING:  Reviewed preventive health counseling, as reflected in patient instructions       Regular exercise       Healthy diet/nutrition    Estimated body mass index is 37.13 kg/m  as calculated from the following:    Height as of this encounter: 1.575 m (5' 2\").    Weight as of this encounter: 92.1 kg (203 lb).    Weight management plan: Discussed healthy diet and exercise guidelines    She reports that she has never smoked. She has never used smokeless tobacco.      Counseling Resources:  ATP IV Guidelines  Pooled Cohorts Equation Calculator  Breast Cancer Risk Calculator  BRCA-Related Cancer Risk Assessment: FHS-7 Tool  FRAX Risk Assessment  ICSI Preventive Guidelines  Dietary Guidelines for Americans, 2010  USDA's MyPlate  ASA Prophylaxis  Lung CA Screening    50 minutes spent on the date of the encounter doing chart review, history and exam, documentation and further activities per the note, approximately 30 minutes was spent discussing issues above and beyond what is " covered in a preventative physical.     Aleida Carrera PA-C  [facility]

## 2022-06-06 NOTE — TELEPHONE ENCOUNTER
Pt referred to [facility] by Aleida Carrera PA-C for bilateral lower extremity stasis dermatitis.     Pt needs to be scheduled for new pt in person consult with Araceli Hays PA-C.  Will route to scheduling to coordinate an appointment at next available.     Appt note: New pt ref by [facility] by Aleida Carrera PA-C for bilateral lower extremity stasis dermatitis.     CHARISSE Thornton, RN  [facility]

## 2022-06-06 NOTE — LETTER
June 9, 2022      Cammie Llamas  [address]  [place] [address]        Dear ,    We are writing to inform you of your test results.    Your potassium is low, I recommend you increase your intake of potassium rich foods, such as bananas, spinach, broccoli, asparagus, brussel sprouts, and tomatoes.  Let's recheck this level in 2 - 3 weeks.     The rest of your labs are normal.      Please follow-up if you have any questions or concerns.    Resulted Orders   TSH with free T4 reflex   Result Value Ref Range    TSH 0.69 0.40 - 4.00 mU/L   Basic metabolic panel  (Ca, Cl, CO2, Creat, Gluc, K, Na, BUN)   Result Value Ref Range    Sodium 139 133 - 144 mmol/L    Potassium 3.2 (L) 3.4 - 5.3 mmol/L    Chloride 103 94 - 109 mmol/L    Carbon Dioxide (CO2) 30 20 - 32 mmol/L    Anion Gap 6 3 - 14 mmol/L    Urea Nitrogen 22 7 - 30 mg/dL    Creatinine 0.94 0.52 - 1.04 mg/dL    Calcium 9.4 8.5 - 10.1 mg/dL    Glucose 96 70 - 99 mg/dL    GFR Estimate 67 >60 mL/min/1.73m2      Comment:      Effective December 21, 2021 eGFRcr in adults is calculated using the 2021 CKD-EPI creatinine equation which includes age and gender ( et al., NEJ, DOI: 10.1056/SIEDiy3295293)   Albumin Random Urine Quantitative with Creat Ratio   Result Value Ref Range    Creatinine Urine mg/dL 61 mg/dL    Albumin Urine mg/L <5 mg/L    Albumin Urine mg/g Cr        Comment:      Unable to calculate:  Urine creatinine or albumin value below detectable level       If you have any questions or concerns, please call the clinic at the number listed above.       Sincerely,      Aleida Carrera PA-C/miguel

## 2022-06-06 NOTE — PATIENT INSTRUCTIONS
Please call [phone] to schedule the mammogram    Someone will call you to schedule the colonoscopy, see highlighted number if you do not hear from them.     For your hands/finger, I suggest an anti-inflammatory medication such as advil 400 mg twice per day for 7 days (with food).  Then switch to a topical arthritis medication (Australian Dreams, Bengay) to be used PRN.      Discussed Shingrex vaccine, please inquire at pharmacy for coverage and administration.        Preventive Health Recommendations  Female Ages 50 - 64    Yearly exam: See your health care provider every year in order to  Review health changes.   Discuss preventive care.    Review your medicines if your doctor has prescribed any.    Get a Pap test every three years (unless you have an abnormal result and your provider advises testing more often).  If you get Pap tests with HPV test, you only need to test every 5 years, unless you have an abnormal result.   You do not need a Pap test if your uterus was removed (hysterectomy) and you have not had cancer.  You should be tested each year for STDs (sexually transmitted diseases) if you're at risk.   Have a mammogram every 1 to 2 years.  Have a colonoscopy at age 50, or have a yearly FIT test (stool test). These exams screen for colon cancer.    Have a cholesterol test every 5 years, or more often if advised.  Have a diabetes test (fasting glucose) every three years. If you are at risk for diabetes, you should have this test more often.   If you are at risk for osteoporosis (brittle bone disease), think about having a bone density scan (DEXA).    Shots: Get a flu shot each year. Get a tetanus shot every 10 years.    Nutrition:   Eat at least 5 servings of fruits and vegetables each day.  Eat whole-grain bread, whole-wheat pasta and brown rice instead of white grains and rice.  Get adequate Calcium and Vitamin D.     Lifestyle  Exercise at least 150 minutes a week (30 minutes a day, 5 days a week).  This will help you control your weight and prevent disease.  Limit alcohol to one drink per day.  No smoking.   Wear sunscreen to prevent skin cancer.   See your dentist every six months for an exam and cleaning.  See your eye doctor every 1 to 2 years.

## 2022-06-07 ENCOUNTER — TELEPHONE (OUTPATIENT)
Dept: GASTROENTEROLOGY | Facility: CLINIC | Age: 62
End: 2022-06-07
Payer: COMMERCIAL

## 2022-06-07 ENCOUNTER — HOSPITAL ENCOUNTER (OUTPATIENT)
Facility: AMBULATORY SURGERY CENTER | Age: 62
End: 2022-06-07
Attending: INTERNAL MEDICINE
Payer: COMMERCIAL

## 2022-06-07 PROBLEM — M19.011 PRIMARY OSTEOARTHRITIS OF RIGHT SHOULDER: Status: ACTIVE | Noted: 2022-06-07

## 2022-06-07 PROBLEM — M19.042 PRIMARY OSTEOARTHRITIS OF BOTH HANDS: Status: ACTIVE | Noted: 2022-06-07

## 2022-06-07 PROBLEM — M19.041 PRIMARY OSTEOARTHRITIS OF BOTH HANDS: Status: ACTIVE | Noted: 2022-06-07

## 2022-06-07 PROBLEM — M65.30 TRIGGER FINGER, ACQUIRED: Status: ACTIVE | Noted: 2022-06-07

## 2022-06-07 LAB
ANION GAP SERPL CALCULATED.3IONS-SCNC: 6 MMOL/L (ref 3–14)
BUN SERPL-MCNC: 22 MG/DL (ref 7–30)
CALCIUM SERPL-MCNC: 9.4 MG/DL (ref 8.5–10.1)
CHLORIDE BLD-SCNC: 103 MMOL/L (ref 94–109)
CO2 SERPL-SCNC: 30 MMOL/L (ref 20–32)
CREAT SERPL-MCNC: 0.94 MG/DL (ref 0.52–1.04)
CREAT UR-MCNC: 61 MG/DL
GFR SERPL CREATININE-BSD FRML MDRD: 67 ML/MIN/1.73M2
GLUCOSE BLD-MCNC: 96 MG/DL (ref 70–99)
MICROALBUMIN UR-MCNC: <5 MG/L
MICROALBUMIN/CREAT UR: NORMAL MG/G{CREAT}
POTASSIUM BLD-SCNC: 3.2 MMOL/L (ref 3.4–5.3)
SODIUM SERPL-SCNC: 139 MMOL/L (ref 133–144)
TSH SERPL DL<=0.005 MIU/L-ACNC: 0.69 MU/L (ref 0.4–4)

## 2022-06-07 NOTE — TELEPHONE ENCOUNTER
Screening Questions  BlueKIND OF PREP RedLOCATION [review exclusion criteria] GreenSEDATION TYPE  1. Are you active on mychart? n    2. Ordering/Referring Provider: Aleida Carrera PA-C    3. What insurance is in the chart? [employer]     4. Do you have a legal guardian or medical Power of ?  Are you able to give consent for your medical care? n   (Sedation review/consideration needed)    3.   BMI 36.2 [BMI OVER 40-EXTENDED PREP]  If greater than 40 review exclusion criteria [PAC APPT IF @ [facility]]      4. Have you had a positive covid test in the last 90 days? n     5.  Respiratory Screening :  [If yes to any of the following HOSPITAL setting only]     Do you use daily home oxygen? n  Do you have mod to severe Obstructive Sleep Apnea? n [OKAY @ [facility] SH PH RI]   Do you have Pulmonary Hypertension? n   Do you have UNCONTROLLED asthma? n      6.   Have you had a heart or lung transplant? n      7.   Are you currently on dialysis? n [ If yes, G-PREP & HOSPITAL setting only]     8.   Do you have chronic kidney disease? n[ If yes, G-PREP ]    9.   Have you had a stroke or Transient ischemic attack (TIA - aka  mini stroke ) within 6 months?  n(If yes, please review exclusion criteria)    10.   In the past 6 months, have you had any heart related issues including cardiomyopathy or heart attack? n          If yes, did it require cardiac stenting or other implantable device?       11.   Do you have any implantable devices in your body (pacemaker, defib, LVAD)? n (If yes, please review exclusion criteria)    12.   Do you take nitroglycerin? n           If yes, how often?   (if yes, HOSPITAL setting ONLY)    13.   Are you currently taking any blood thinners? n           [IF YES, INFORM PATIENT TO FOLLOW UP W/ ORDERING PROVIDER FOR BRIDGING INSTRUCTIONS]     14.   Do you have a diagnosis of diabetes? n [ If yes, G-PREP ]    15.   [FEMALES] Are you currently pregnant? n    If yes, how many weeks?      16.   Are you taking any prescription pain medications on a routine schedule?  n [ If yes, EXTENDED PREP.] [If yes, MAC]    17.   Do you have any chemical dependencies such as alcohol, street drugs, or methadone?  n [If yes, MAC]    18.   Do you have any history of post-traumatic stress syndrome, severe anxiety or history of psychosis?  n [If yes, MAC]    19.   Do you transfer independently?  y    20.  On a regular basis do you go 3-5 days between bowel movements? n [ If yes, EXTENDED PREP.]    21.   Preferred LOCAL Pharmacy for Pre Prescription      [id number] - [place], [place] - [address]      Scheduling Details      Caller :  Caty   (Please ask for phone number if not scheduled by patient)    Type of Procedure Scheduled: Lower Endoscopy [Colonoscopy]  Which Colonoscopy Prep was Sent?: Miralax    Surgeon: Soni  Date of Procedure: 8/1 12pm  Location: [facility]    Sedation Type: CS    Conscious Sedation- Needs  for 6 hours after the procedure  MAC/General-Needs  for 24 hours after procedure    Pre-op Required at [facility] [place], [place] and OR for MAC sedation: n  (advise patient they will need a pre-op prior to procedure -)      Informed patient they will need an adult  y  Cannot take any type of public or medical transportation alone    Pre-Procedure Covid test to [facility] or Externally: home test    Confirmed Nurse will call to complete assessment y    Additional comments:

## 2022-06-07 NOTE — TELEPHONE ENCOUNTER
Scheduled for 7/11/22.     Future Appointments   Date Time Provider Department Center   7/11/2022  9:20 AM Araceli Hays PA-C [facility]         Nay Angel    [facility]   [phone]

## 2022-06-13 ENCOUNTER — ANCILLARY PROCEDURE (OUTPATIENT)
Dept: MAMMOGRAPHY | Facility: CLINIC | Age: 62
End: 2022-06-13
Attending: PHYSICIAN ASSISTANT
Payer: COMMERCIAL

## 2022-06-13 DIAGNOSIS — Z12.31 VISIT FOR SCREENING MAMMOGRAM: ICD-10-CM

## 2022-06-13 PROCEDURE — 77067 SCR MAMMO BI INCL CAD: CPT | Mod: GC | Performed by: RADIOLOGY

## 2022-07-11 ENCOUNTER — OFFICE VISIT (OUTPATIENT)
Dept: OTHER | Facility: CLINIC | Age: 62
End: 2022-07-11
Attending: PHYSICIAN ASSISTANT
Payer: COMMERCIAL

## 2022-07-11 VITALS
OXYGEN SATURATION: 98 % | WEIGHT: 198 LBS | DIASTOLIC BLOOD PRESSURE: 78 MMHG | SYSTOLIC BLOOD PRESSURE: 126 MMHG | HEIGHT: 63 IN | HEART RATE: 77 BPM | BODY MASS INDEX: 35.08 KG/M2

## 2022-07-11 DIAGNOSIS — I10 BENIGN ESSENTIAL HYPERTENSION: ICD-10-CM

## 2022-07-11 DIAGNOSIS — I87.2 PERIPHERAL VENOUS INSUFFICIENCY: ICD-10-CM

## 2022-07-11 DIAGNOSIS — I87.2 STASIS DERMATITIS OF BOTH LEGS: Primary | ICD-10-CM

## 2022-07-11 PROCEDURE — 99204 OFFICE O/P NEW MOD 45 MIN: CPT | Performed by: PHYSICIAN ASSISTANT

## 2022-07-11 PROCEDURE — G0463 HOSPITAL OUTPT CLINIC VISIT: HCPCS

## 2022-07-11 NOTE — PROGRESS NOTES
[facility] INITIAL VASCULAR MEDICINE CONSULT      PRIMARY HEALTH CARE PROVIDER:  Aleida Carrera      REFERRING HEALTH CARE PROVIDER;  Aleida Carrera      REASON FOR CONSULT:  Venous stasis dermatitis      HPI: Caty Llamas is a 62 year old female with hypertension on amlodipine, metoprolol and chlorthalidone who for many years has had swelling in her lower extremities. She then eventually developed an orange/bronze discoloration in her lower legs. In April 2022 her brother gave her a pair of compression socks. She started wearing these and states her swelling and discoloration has started to improve. She also has been working hard on losing weight and has lost 50 pounds this past year. She works from home but has been getting up during her breaks and doing her treadmill. She is also eating healthy. At times her legs feel very heavy, almost like elephant trunks. She does have a family history of varicose veins.     PAST MEDICAL HISTORY  Past Medical History:   Diagnosis Date     Abnormal Pap smear of cervix 2011    see problem list     CARDIOVASCULAR SCREENING; LDL GOAL LESS THAN 160 5/9/2010     Cervical high risk HPV (human papillomavirus) test positive 03/27/2017, 2019    see problem list     Hx of colposcopy with cervical biopsy 2011    wnl     Hypertension goal BP (blood pressure) < 140/90 12/5/2011     Status post colposcopy 05/02/2017    ECC - negative for dysplasia       CURRENT MEDICATIONS  amLODIPine (NORVASC) 10 MG tablet, Take 1 tablet (10 mg) by mouth daily  chlorthalidone (HYGROTON) 25 MG tablet, Take 0.5 tablets (12.5 mg) by mouth daily  metoprolol succinate ER (TOPROL XL) 100 MG 24 hr tablet, Take 1 tablet (100 mg) by mouth daily  albuterol (PROAIR HFA/PROVENTIL HFA/VENTOLIN HFA) 108 (90 Base) MCG/ACT inhaler, Inhale 1-2 puffs into the lungs every 4 hours as needed for shortness of breath / dyspnea or wheezing (Patient not taking: No sig  reported)  polyethylene glycol (MIRALAX) 17 GM/Dose powder, Take 17 g by mouth daily as needed for constipation (Patient not taking: Reported on 7/11/2022)    No current facility-administered medications on file prior to visit.      PAST SURGICAL HISTORY:  Past Surgical History:   Procedure Laterality Date     ARTHROSCOPY KNEE RT/LT  1988, 1994    left knee       ALLERGIES     Allergies   Allergen Reactions     Butalbital-Aspirin-Caffeine      Salicylates      ferenol       FAMILY HISTORY  Family History   Problem Relation Age of Onset     C.A.D. Father      Cerebrovascular Disease Father      Cancer Father      Diabetes Sister      Hypertension Sister      Diabetes Brother      Diabetes Sister      Hypertension Sister        SOCIAL HISTORY  Social History     Socioeconomic History     Marital status: Single     Spouse name: Not on file     Number of children: Not on file     Years of education: Not on file     Highest education level: Not on file   Occupational History     Not on file   Tobacco Use     Smoking status: Never Smoker     Smokeless tobacco: Never Used     Tobacco comment: smoke every blue moon   Substance and Sexual Activity     Alcohol use: Yes     Comment: very seldom     Drug use: No     Sexual activity: Not Currently       ROS:   General: No change in weight, sleep or appetite.  Normal energy.  No fever or chills  Eyes: Negative for vision changes or eye problems  ENT: No problems with ears, nose or throat.  No difficulty swallowing.  Resp: No coughing, wheezing or shortness of breath  CV: No chest pains or palpitations  GI: No nausea, vomiting,  heartburn, abdominal pain, diarrhea, constipation or change in bowel habits  : No urinary frequency or dysuria, bladder or kidney problems  Musculoskeletal: No significant muscle or joint pains. Knee pain better with weight loss. Edema in lower legs.   Neurologic: No headaches, numbness, tingling, weakness, problems with balance or  "coordination  Psychiatric: No problems with anxiety, depression or mental health  Heme/immune/allergy: No history of bleeding or clotting problems or anemia.  No allergies or immune system problems  Endocrine: No history of thyroid disease, diabetes or other endocrine disorders  Skin: No rashes,worrisome lesions or skin problems  Vascular:  No claudication, lifestyle limiting or otherwise; no ischemic rest pain; no non-healing ulcers. No weakness, No loss of sensation. Varicose vein right leg. Heaviness and swelling in legs.     EXAM:  /78 (BP Location: Left arm, Patient Position: Chair, Cuff Size: Adult Regular)   Pulse 77   Ht 5' 3\" (1.6 m)   Wt 198 lb (89.8 kg)   LMP  (LMP Unknown)   SpO2 98%   BMI 35.07 kg/m    In general, the patient is a pleasant female in no apparent distress.    HEENT: NC/AT.  PERRLA.  EOMI.  Sclerae white, not injected.  Dentition intact.    Neck:  Carotids +2/2 bilaterally without bruits.    Heart: RRR. Normal S1, S2 splits physiologically. No murmur, rub, click, or gallop.   Lungs: CTA.  No ronchi, wheezes, rales.    Abdomen: Soft, nontender, nondistended.   Extremities: No clubbing, cyanosis.  No wounds. Varicose vein on right lower extremity in popliteal fossa area and up lateral thigh. Bilateral lower extremity edema with some chronic venous stasis changes.      Labs:  LIPID RESULTS:  Lab Results   Component Value Date    CHOL 208 (H) 05/10/2021    HDL 49 (L) 05/10/2021     (H) 05/10/2021    TRIG 80 05/10/2021    CHOLHDLRATIO 4.2 08/14/2014       LIVER ENZYME RESULTS:  Lab Results   Component Value Date    AST 26 12/04/2008    ALT 21 12/04/2008       CBC RESULTS:  Lab Results   Component Value Date    WBC 7.5 01/15/2013    RBC 4.28 01/15/2013    HGB 13.3 01/15/2013    HCT 39.2 01/15/2013    MCV 92 01/15/2013    MCH 31.1 01/15/2013    MCHC 33.9 01/15/2013    RDW 13.6 01/15/2013     01/15/2013       BMP RESULTS:  Lab Results   Component Value Date     " 06/06/2022     05/10/2021    POTASSIUM 3.2 (L) 06/06/2022    POTASSIUM 3.9 05/10/2021    CHLORIDE 103 06/06/2022    CHLORIDE 102 05/10/2021    CO2 30 06/06/2022    CO2 32 05/10/2021    ANIONGAP 6 06/06/2022    ANIONGAP 3 05/10/2021    GLC 96 06/06/2022     (H) 05/10/2021    BUN 22 06/06/2022    BUN 17 05/10/2021    CR 0.94 06/06/2022    CR 0.92 05/10/2021    GFRESTIMATED 67 06/06/2022    GFRESTIMATED 66 05/10/2021    GFRESTBLACK 77 05/10/2021    LYNN 9.4 06/06/2022    LYNN 9.5 05/10/2021        THYROID RESULTS:  Lab Results   Component Value Date    TSH 0.69 06/06/2022    TSH 1.37 12/12/2012       Procedures:   None      Assessment and Plan:   Venous insufficiency with venous stasis changes    -She has had leg swelling for some time with now venous stasis changes for months. She also has leg heaviness and fatigue.   -She has had some improvement in swelling and stasis dermatitis with wearing compression socks (knee-high, unknown strength) and weight loss.   -She is active, exercising frequently.   -She has a family history of varicose veins.     Recommendations:  -Prescription given for compression socks (20-30 mmHg thigh high) as she does have a larger bulging varicose vein on her right leg into her thigh. She should wear these during the day and remove them at night.   -Continue to exercise and lose weight as able.   -Return to clinic in 3 months (as she has already worn compression for 3 months) to reassess her symptoms. If she is not happy with symptoms after utilizing compression socks, then we will obtain venous competency studies.   -Consider alternative blood pressure medication for amlodipine as this can also contribute to ankle swelling.   -Reassured her that venous insufficiency does not increase her risk for stroke as she was worried about.       Araceli Hays PA-C      45 minutes spent on the date of the encounter doing chart review, history and exam, documentation, and further activities as  noted above.

## 2022-07-11 NOTE — PATIENT INSTRUCTIONS
Get and wear compression socks (20-30 mmHg knee or thigh high).     2.  Follow-up in 3 months. We will send you a letter to schedule this. If you aren't happy with your results, then we can obtain venous competency testing at that time.     3.  Follow-up with your primary care provider about possibly changing Amlodipine as this can also cause lower extremity edema.     4. Call us with any questions or concerns ([phone]).

## 2022-07-11 NOTE — PROGRESS NOTES
"Patient is here to discuss consult.    /78 (BP Location: Left arm, Patient Position: Chair, Cuff Size: Adult Regular)   Pulse 77   Ht 5' 3\" (1.6 m)   Wt 198 lb (89.8 kg)   LMP  (LMP Unknown)   SpO2 98%   BMI 35.07 kg/m      Questions patient would like addressed today are: N/A.    Refills are needed: N/A    Has homecare services and agency name:  Sneha Samson  "

## 2022-07-27 ENCOUNTER — TELEPHONE (OUTPATIENT)
Dept: GASTROENTEROLOGY | Facility: CLINIC | Age: 62
End: 2022-07-27

## 2022-07-27 DIAGNOSIS — Z12.11 ENCOUNTER FOR SCREENING COLONOSCOPY: Primary | ICD-10-CM

## 2022-07-27 RX ORDER — BISACODYL 5 MG/1
TABLET, DELAYED RELEASE ORAL
Qty: 4 TABLET | Refills: 0 | Status: SHIPPED | OUTPATIENT
Start: 2022-07-27 | End: 2023-01-30

## 2022-07-27 NOTE — TELEPHONE ENCOUNTER
Patient returned call.     States that they will not be completing the test as they are not understanding why they need to have a  as they had this completed before and they just needed to wait 30 minutes after procedure and they drove themselves home.    Patient states they will reschedule at a later time if they need to.     Will send to endoscopy scheduling to cancel procedure per request.    Mary Lou Fontanez RN

## 2022-07-27 NOTE — LETTER
[facility] ENDOSCOPY  [address] [address]  Phone: [phone]    07/27/22    Caty Llamas  [address]  [place] [address]      To whom it may concern:     A magnesium citrate safety advisory alert has been initiated. Effective immediately magnesium citrate oral solution (lemon flavor) has been removed from stock at local pharmacies.  Due to limited availability of other flavors of magnesium citrate an alternative bowel prep, Golytely, is being recommended.  This bowel prep has been sent [place] [id number] [place] [address] [facility] pharmacy and updated instructions are attached below.    Please contact our endoscopy team if you have any further questions at [phone] option 3 (Monday-Friday 8:00am-4:30pm)      Thank you,   [facility] Endoscopy     -----------------------------------------------------------------------------------------------------------------------------------------------------------------------------------------------------------------------------------------------------------------  STANDARD Golytely (Colyte, Nulytely)  Prep Instructions for your Colonoscopy      Please read these instructions carefully at least 7 days prior to your colonoscopy procedure. Be sure to follow all directions completely. The inside of your colon must be clean to allow for a complete examination for the presence of any growths, polyps, and/or abnormalities, as well as their biopsy or removal. A number of tips are included in order to make this part of the procedure as comfortable as possible.    Getting ready:     A nurse will call you approximately 1 week before your exam to prescribe your bowel prep and review the prep instructions with you.    You must arrange for an adult to drive you home after your exam. Your colonoscopy cannot be done unless you have a ride. If you need to use public transportation, someone  must ride with you and stay with you for a minimum of 6-24 hours.    Check with your insurance company to be sure they will cover this exam.    7 days before the exam:    Talk to your doctor:  If you take blood-thinners (such as Coumadin, Plavix, Xarelto), your prescription or schedule may need to change before the test.    Stop taking fiber supplements, multi-vitamins with iron, and medicines that contain iron.    Continue taking prescribed aspirin; talk to your prescribing doctor with any concerns.    Stop eating corn, nuts and foods with seeds.  These can stay in the colon for days.    If you have diabetes:  Ask to have your exam early in the morning.  Also, ask your doctor if you should change your diet or medicines.    3 days before the exam:    Begin a low-fiber diet: No raw fruits or vegetables, whole wheat, seeds, nuts, popcorn or other high-fiber foods (see list on page). No binding agents: (bran, Metamucil, Fibercon) and no Olestra (a fat substitute).    One day before the exam:    You can have a light, low-fiber breakfast. But drink only clear liquids after 9 a.m. (see list below). Drink at least 8 to 10 full glasses of clear liquids during the day.     Fill the jug that contains the Golytely powder with warm water. Cover and shake until well mixed. Use a full gallon of water. Chill for 3 hours, but do not add ice.    You will start drinking half of the Golytely solution at 6 p.m. You should drink the other half about 6 hours before your exam. So, the timing of Step 2 will depend on your exam time.     After you start drinking the solution, stay near a toilet. You may have watery stools (diarrhea), mild cramping, bloating , and nausea.     Step One:  o At 3 p.m., take 2 tablets of Dulcolax (bisacodyl).  o At 6 p.m. start drinking the Golytely solution. Drink an 8-ounce glass every 15 minutes until the jug is half empty. Drink each glass quickly.     Step Two:   If you arrive before 11 AM:  At 11 p.m. on  the day before your exam:    Take 2 Dulcolax (Bisacodyl) tablets.     Start drinking the other half of the Golytely jug. Drink one 8-ounce glass every 15 minutes until the jug is empty. Drink each glass quickly.  If you arrive after 11 AM:  At 6 AM on the day of the exam:    Take 2 tablets of Dulcolax (Bisacodyl).     Drink the other half of the Golytyel jug. Drink one 8-ounce glass every 15 minutes until the jug is empty. Drink each glass quickly. You should finish the prep 4 hours before the exam.      Day of exam:      You may drink clear liquids only up until 4 hours before your exam.    Do not drink anything 4 hours before your exam, not even water. (If you must take medicine, you can take it with a sip of water.)     Do not chew or swallow anything including water or gum for at least 4 hours before your exam. If you do, we may cancel the exam for your safety.     Do not take diabetes medicine by mouth until after your exam.    If you have asthma, bring your inhaler.    Arrive with an adult who will take you home after your test. The medicine used will make you sleepy. If you don't have someone to take you home, we will cancel your test.       CLEAR LIQUIDS   You may have:    Water, tea, coffee (no milk or cream)    Soda pop, Gatorade (not red or purple)    Jell-O, Popsicles (no milk or fruit pieces - not red or purple)    Fat-free soup broth or bouillon    Plain hard candy, such as clear life savers (not red or purple)    Clear juices and fruit-flavored drinks, such as apple juice, white grape juice, Hi-C, and Blayne-Aid (not red or purple)   Do not have:    Milk or milk products such as ice cream, malts or shakes, or coffee creamer    Red or purple drinks of any kind such as cranberry juice or grape juice. Avoid red or purple Jell-O, Popsicles, Blayne-Aid, sorbet, sherbet and candy    Juices with pulp such as orange, grapefruit, pineapple or tomato juice    Cream soups of any kind    Alcohol and beer    Protein  drinks or protein powder     LOW FIBER DIET   You may have:      Starches: White bread, rolls, biscuits, croissants, Vienna toast, white flour tortillas, waffles, pancakes, Japanese toast; white rice, noodles, pasta, macaroni; cooked and peeled potatoes; plain crackers, saltines; cooked farina or cream of rice; puffed rice, corn flakes, Rice Krispies, Special K     Vegetables: tender cooked and canned, vegetable broths    Fruits and fruit juices: Strained fruit juice, canned fruit without seeds or skin (not pineapple), applesauce, pear sauce, ripe bananas, melons (not watermelon)     Milk products: Milk (plain or flavored), cheese, cottage cheese, yogurt (no berries), custard, ice cream      Proteins: Tender, well-cooked ground beef, lamb, veal, ham, pork, chicken, turkey, fish or organ meats; eggs; creamy peanut butter     Fats and condiments:  Margarine, butter, oils, mayonnaise, sour cream, salad dressing, plain gravy; spices, cooked herbs; sugar, clear jelly, honey, syrup     Snacks, sweets and drinks: Pretzels, hard candy; plain cakes and cookies (no nuts or seeds); gelatin, plain pudding, sherbet, Popsicles; coffee, tea, carbonated ( fizzy ) drinks Do not have:      Starches: Breads or rolls that contain nuts, seeds or fruit; whole wheat or whole grain breads that contain more than 1 gram of fiber per slice; cornbread; corn or whole wheat tortillas; potatoes with skin; brown rice, wild rice, kasha (buckwheat), and oatmeal     Vegetables: Any raw or steamed vegetables; vegetables with seeds; corn in any form     Fruits and fruit juices: Prunes, prune juice, raisins and other dried fruits, berries and other fruits with seeds, canned pineapple juices with pulp such as orange, grapefruit, pineapple or tomato juice    Milk products: Any yogurt with nuts, seeds or berries     Proteins: Tough, fibrous meats with gristle; cooked dried beans, peas or lentils; crunchy peanut butter    Fats and condiments: Pickles, olives,  relish, horseradish; jam, marmalade, preserves     Snacks, sweets and drinks: Popcorn, nuts, seeds, granola, coconut, candies made with nuts or seeds; all desserts that contain nuts, seeds, raisins and other dried fruits, coconut, whole grains or bran.        FAQ:      How do you know if your colon is cleaned out?   o After completing the bowel prep, your bowel movements should be all liquid and yellow. Your bowel movements will look similar to urine in the toilet. If there are pieces of stool (poop) in the toilet, or if you can't see to the bottom of the toilet, please call our office for advice. Call [phone] and ask to speak with a nurse.     Why do you need a responsible  to take you home and stay with you?  o We require a responsible adult to take you home for your safety. The sedation medicines used to relax you during the procedure can impair your judgement and reaction time, make you forgetful and possible a little unsteady. Do not drive, make any important decisions, or sign any legal documents for 24 hours after your procedure.     It is normal to feel bloated and gassy after your procedure. Walking will help move the air through your colon. You can take non-aspirin pain relievers that contain acetaminophen (Tylenol).     When can you eat after your procedure?  o You may resume your normal diet when you feel ready, unless advised otherwise by the doctor performing your procedure. Do not drink alcohol for 24 hours after your procedure.     You many resume normal activities (work, exercise, etc.) after 24 hours.     When will you get test results?  o You should have your procedure results and any lab results (if applicable) by letter, MyChart message, or phone call within 2 weeks. If you have any questions, please call the doctor that referred you for the procedure.       Thank you for choosing [facility], for your procedure. If you are sent a survey regarding your care, please take the time  to complete the questionnaire. We value your feedback!             Updated: 6/22/2022        Sincerely,      Esthela Jarvis RN

## 2022-07-27 NOTE — TELEPHONE ENCOUNTER
Attempted to contact patient regarding upcoming Colonoscopy procedure on 8/1/22 for pre assessment questions.  No answer.  Left message to return call to [phone] #2    Patient scheduled for Colonoscopy on 8/1/22.     Discuss at home test option.     Arrival time: 12:00pm    [place] [facility] [place]edation type: CS    Indication for procedure: Screening    Anticoagulations? None     Bowel prep recommendation: GoLytely d/t Mag Citrate recall. (originally M-Prep)    [facility]ely [id number] - [place], [place] - [address] [facility]  pharmacy. Prep instructions sent via Letter    Pre visit planning completed.    Esthela Jarvis RN

## 2022-08-03 DIAGNOSIS — I10 HYPERTENSION GOAL BP (BLOOD PRESSURE) < 140/90: ICD-10-CM

## 2022-08-03 RX ORDER — CHLORTHALIDONE 25 MG/1
TABLET ORAL
Qty: 90 TABLET | OUTPATIENT
Start: 2022-08-03

## 2022-08-03 NOTE — TELEPHONE ENCOUNTER
Last written on 6/6/22 & sent to requesting pharmacy.     Anahy Vieyra RN BSN  [facility]

## 2022-10-24 ENCOUNTER — OFFICE VISIT (OUTPATIENT)
Dept: OTHER | Facility: CLINIC | Age: 62
End: 2022-10-24
Attending: PHYSICIAN ASSISTANT
Payer: COMMERCIAL

## 2022-10-24 VITALS
OXYGEN SATURATION: 96 % | WEIGHT: 206 LBS | HEIGHT: 63 IN | SYSTOLIC BLOOD PRESSURE: 121 MMHG | HEART RATE: 72 BPM | DIASTOLIC BLOOD PRESSURE: 75 MMHG | BODY MASS INDEX: 36.5 KG/M2

## 2022-10-24 DIAGNOSIS — I87.2 PERIPHERAL VENOUS INSUFFICIENCY: ICD-10-CM

## 2022-10-24 DIAGNOSIS — I87.2 STASIS DERMATITIS OF BOTH LEGS: ICD-10-CM

## 2022-10-24 DIAGNOSIS — I10 BENIGN ESSENTIAL HYPERTENSION: ICD-10-CM

## 2022-10-24 PROCEDURE — G0463 HOSPITAL OUTPT CLINIC VISIT: HCPCS

## 2022-10-24 PROCEDURE — 99213 OFFICE O/P EST LOW 20 MIN: CPT | Performed by: PHYSICIAN ASSISTANT

## 2022-10-24 NOTE — PROGRESS NOTES
[facility]  VASCULAR MEDICINE FOLLOW-UP VISIT      PRIMARY HEALTH CARE PROVIDER:  Aleida Carrera    REASON FOR VISIT:  Follow-up chronic venous insufficiency      HPI: Caty Llamas is a very pleasant 62 year old female with hypertension on amlodipine, metoprolol and chlorthalidone and a family history of varicose veins who for many years has had swelling in her lower extremities. She then eventually developed an orange/bronze discoloration in her lower legs. In April 2022 her brother gave her a pair of compression socks. She started wearing these and states her swelling and discoloration started to improve. She also has been working hard on losing weight and has lost 50 pounds this past year. She works from home but has been getting up during her breaks and doing her treadmill. She is also eating healthy.     She has been wearing knee-high compression socks daily for the past 3+ months and her leg swelling and bronze discoloration has significantly improved. In the past there were times her legs felt very heavy, almost like elephant trunks, but this has pretty much resolved with the use of compression socks. She is able to walk more and is generally pleased with her results.       PAST MEDICAL HISTORY  Past Medical History:   Diagnosis Date     Abnormal Pap smear of cervix 2011    see problem list     CARDIOVASCULAR SCREENING; LDL GOAL LESS THAN 160 5/9/2010     Cervical high risk HPV (human papillomavirus) test positive 03/27/2017, 2019    see problem list     Hx of colposcopy with cervical biopsy 2011    wnl     Hypertension goal BP (blood pressure) < 140/90 12/5/2011     Status post colposcopy 05/02/2017    ECC - negative for dysplasia       PAST SURGICAL HISTORY:  Past Surgical History:   Procedure Laterality Date     ARTHROSCOPY KNEE RT/LT  1988, 1994    left knee         CURRENT MEDICATIONS  amLODIPine (NORVASC) 10 MG tablet, Take 1 tablet (10 mg) by mouth daily  bisacodyl  (DULCOLAX) 5 MG EC tablet, Take as directed. One day before exam take 2 tablets at 3 PM. Day of exam take 2 tablets at 6 AM.  chlorthalidone (HYGROTON) 25 MG tablet, Take 0.5 tablets (12.5 mg) by mouth daily  metoprolol succinate ER (TOPROL XL) 100 MG 24 hr tablet, Take 1 tablet (100 mg) by mouth daily  albuterol (PROAIR HFA/PROVENTIL HFA/VENTOLIN HFA) 108 (90 Base) MCG/ACT inhaler, Inhale 1-2 puffs into the lungs every 4 hours as needed for shortness of breath / dyspnea or wheezing (Patient not taking: Reported on 5/24/2021)    No current facility-administered medications on file prior to visit.      ALLERGIES     Allergies   Allergen Reactions     Butalbital-Aspirin-Caffeine      Salicylates      ferenol       FAMILY HISTORY  Family History   Problem Relation Age of Onset     C.A.D. Father      Cerebrovascular Disease Father      Cancer Father      Diabetes Sister      Hypertension Sister      Diabetes Brother      Diabetes Sister      Hypertension Sister        SOCIAL HISTORY  Social History     Socioeconomic History     Marital status: Single     Spouse name: Not on file     Number of children: Not on file     Years of education: Not on file     Highest education level: Not on file   Occupational History     Not on file   Tobacco Use     Smoking status: Never     Smokeless tobacco: Never     Tobacco comments:     smoke every blue moon   Substance and Sexual Activity     Alcohol use: Not Currently     Comment: very seldom     Drug use: No     Sexual activity: Not Currently   Other Topics Concern     Parent/sibling w/ CABG, MI or angioplasty before 65F 55M? Not Asked   Social History Narrative        Dairy/d 3 servings/d.     Caffeine 4-5 servings/d    Exercise 3-4 x week    Sunscreen used - No    Seatbelts used - Yes    Working smoke/CO detectors in the home - Yes    Guns stored in the home - No    Self Breast Exams - Yes    Self Testicular Exam - NA    Eye Exam up to date - No    Dental Exam up to date - No     "Pap Smear up to date - No    Mammogram up to date - No    PSA up to date - NA    Dexa Scan up to date - No    Flex Sig / Colonoscopy up to date - No    Immunizations up to date - Yes    Abuse: Current or Past(Physical, Sexual or Emotional)- No    Do you feel safe in your environment - Yes    Tristian Miguelito HAMZAH           ROS:   Complete ROS negative other than what is stated in HPI.     EXAM:  /75 (BP Location: Right arm, Patient Position: Chair, Cuff Size: Adult Large)   Pulse 72   Ht 1.588 m (5' 2.5\")   Wt 93.4 kg (206 lb)   LMP  (LMP Unknown)   SpO2 96%   BMI 37.08 kg/m    In general, the patient is a pleasant female in no apparent distress.    HEENT: NC/AT.  PERRLA.  EOMI.  Sclerae white, not injected.    Heart: RRR. Normal S1, S2 splits physiologically. No murmur, rub, click, or gallop.   Lungs: CTA.  No ronchi, wheezes, rales.    Extremities: No clubbing, cyanosis. Mild lower extremity edema bilaterally with decreased bronze discoloration compared to several months ago. No wounds. Varicose vein (bulging mildly) on right posterior thigh.     Labs:  LIVER ENZYME RESULTS:  Lab Results   Component Value Date    AST 26 12/04/2008    ALT 21 12/04/2008       CBC RESULTS:  Lab Results   Component Value Date    WBC 7.5 01/15/2013    RBC 4.28 01/15/2013    HGB 13.3 01/15/2013    HCT 39.2 01/15/2013    MCV 92 01/15/2013    MCH 31.1 01/15/2013    MCHC 33.9 01/15/2013    RDW 13.6 01/15/2013     01/15/2013       BMP RESULTS:  Lab Results   Component Value Date     06/06/2022     05/10/2021    POTASSIUM 3.2 (L) 06/06/2022    POTASSIUM 3.9 05/10/2021    CHLORIDE 103 06/06/2022    CHLORIDE 102 05/10/2021    CO2 30 06/06/2022    CO2 32 05/10/2021    ANIONGAP 6 06/06/2022    ANIONGAP 3 05/10/2021    GLC 96 06/06/2022     (H) 05/10/2021    BUN 22 06/06/2022    BUN 17 05/10/2021    CR 0.94 06/06/2022    CR 0.92 05/10/2021    GFRESTIMATED 67 06/06/2022    GFRESTIMATED 66 05/10/2021    GFRESTBLACK 77 " 05/10/2021    LYNN 9.4 06/06/2022    LYNN 9.5 05/10/2021        THYROID RESULTS:  Lab Results   Component Value Date    TSH 0.69 06/06/2022    TSH 1.37 12/12/2012         Procedures:   None    Assessment and Plan:   Venous insufficiency with venous stasis changes     -She has had leg swelling for some time with now venous stasis changes for months. She also has leg heaviness and fatigue.   -She has had some improvement in swelling and stasis dermatitis with wearing compression socks (knee-high, unknown strength) and weight loss.   -She is active, exercising frequently.   -She has a family history of varicose veins.      Recommendations:  -Prescription given for compression socks (20-30 mmHg knee high) again as she lost her last prescription. She would ultimately benefit most from thigh high as she does have a larger bulging varicose vein on her right leg into her thigh, but given significant improvement in symptoms from knee-high socks, she wants to keep wearing these. She should wear these during the day and remove them at night.   -Continue to exercise and lose weight as able.   -Return to clinic in February 2023 to reassess her symptoms. Advised to call sooner if she develops any worsening symptoms - then venous competency can be ordered for further evaluation.  -Consider alternative blood pressure medication for amlodipine as this can also contribute to ankle swelling.         Araceli Hays PA-C      25 minutes spent on the date of the encounter doing chart review, history and exam, documentation, and further activities as noted above.

## 2022-10-24 NOTE — PROGRESS NOTES
"Patient is here to discuss follow up.    /75 (BP Location: Right arm, Patient Position: Chair, Cuff Size: Adult Large)   Pulse 72   Ht 5' 2.5\" (1.588 m)   Wt 206 lb (93.4 kg)   LMP  (LMP Unknown)   SpO2 96%   BMI 37.08 kg/m      Questions patient would like addressed today are: N/A.    Refills are needed: N/A    Has homecare services and agency name:  [facility]  "

## 2022-11-03 ENCOUNTER — TELEPHONE (OUTPATIENT)
Dept: OTHER | Facility: CLINIC | Age: 62
End: 2022-11-03

## 2022-11-03 NOTE — TELEPHONE ENCOUNTER
Patient needs to be scheduled for in person follow up in 3 months with Araceli Hays for venous insuffiencey.    Appt note: 3 month follow up for venous insufficiency.    Natalie MCQUEEN, JESSICA    [facility]  [facility]  Office: [phone]  Fax: [phone]

## 2022-11-07 NOTE — TELEPHONE ENCOUNTER
Future Appointments   Date Time Provider Department Center   2/13/2023 10:20 AM Araceli Hays PA-C [facility]

## 2023-01-03 ENCOUNTER — TELEPHONE (OUTPATIENT)
Dept: FAMILY MEDICINE | Facility: CLINIC | Age: 63
End: 2023-01-03

## 2023-01-03 DIAGNOSIS — I10 HYPERTENSION GOAL BP (BLOOD PRESSURE) < 140/90: ICD-10-CM

## 2023-01-03 RX ORDER — METOPROLOL SUCCINATE 100 MG/1
100 TABLET, EXTENDED RELEASE ORAL DAILY
Qty: 90 TABLET | Refills: 1 | Status: SHIPPED | OUTPATIENT
Start: 2023-01-03 | End: 2023-01-30

## 2023-01-03 RX ORDER — AMLODIPINE BESYLATE 10 MG/1
10 TABLET ORAL DAILY
Qty: 90 TABLET | Refills: 1 | Status: SHIPPED | OUTPATIENT
Start: 2023-01-03 | End: 2023-01-24

## 2023-01-05 RX ORDER — CHLORTHALIDONE 25 MG/1
12.5 TABLET ORAL DAILY
Qty: 45 TABLET | Refills: 0 | Status: SHIPPED | OUTPATIENT
Start: 2023-01-05 | End: 2023-01-30

## 2023-01-06 NOTE — TELEPHONE ENCOUNTER
Please call patient, due for recheck for further refills, please assist patient in scheduling appt.  Darlene refill given.       Aleida Carrera PA-C

## 2023-01-09 NOTE — TELEPHONE ENCOUNTER
If it is urgent and she is having issues with her medication, then you can use a provider approval slot.  If she just needs a refill or there isn't a rush, I can provide another bello refill to last until February.     If those don't work, then yes, she can schedule with another provider (it's just more ideal to try and have her stay with her PCP because I know her history)    Aleida Carrera PA-C

## 2023-01-09 NOTE — TELEPHONE ENCOUNTER
Reason for Call:  Other call back    Detailed comments: Patient returned call and due to unavailability this month with Aleida Carrera, she is wondering if she could schedule with any other provider to further discuss potential changes for her prescription.    Phone Number Patient can be reached at: Cell number on file:    Telephone Information:   Mobile [phone]       Best Time: Any time.    Can we leave a detailed message on this number? YES    Call taken on 1/9/2023 at 1:08 PM by Carlos Nascimento

## 2023-01-10 NOTE — TELEPHONE ENCOUNTER
Spoke with pt, got her scheduled with PCP on 1/30 @1040am .       States she has a 16 day supply of her meds and would need a 5 day supply to get her through to her appt .     Per chart review it appears pt has meds available for  as refills were sent last week.     Matilda Trevino RN  [facility]

## 2023-01-24 DIAGNOSIS — I10 HYPERTENSION GOAL BP (BLOOD PRESSURE) < 140/90: ICD-10-CM

## 2023-01-24 RX ORDER — AMLODIPINE BESYLATE 10 MG/1
10 TABLET ORAL DAILY
Qty: 90 TABLET | Refills: 1 | Status: SHIPPED | OUTPATIENT
Start: 2023-01-24 | End: 2023-01-30

## 2023-01-30 ENCOUNTER — OFFICE VISIT (OUTPATIENT)
Dept: FAMILY MEDICINE | Facility: CLINIC | Age: 63
End: 2023-01-30
Payer: COMMERCIAL

## 2023-01-30 VITALS
OXYGEN SATURATION: 97 % | WEIGHT: 205 LBS | SYSTOLIC BLOOD PRESSURE: 136 MMHG | DIASTOLIC BLOOD PRESSURE: 76 MMHG | RESPIRATION RATE: 16 BRPM | TEMPERATURE: 97.7 F | HEART RATE: 74 BPM | HEIGHT: 63 IN | BODY MASS INDEX: 36.32 KG/M2

## 2023-01-30 DIAGNOSIS — E87.6 HYPOKALEMIA: ICD-10-CM

## 2023-01-30 DIAGNOSIS — I10 HYPERTENSION GOAL BP (BLOOD PRESSURE) < 140/90: Primary | ICD-10-CM

## 2023-01-30 DIAGNOSIS — E66.01 MORBID OBESITY (H): ICD-10-CM

## 2023-01-30 PROCEDURE — 99214 OFFICE O/P EST MOD 30 MIN: CPT | Performed by: PHYSICIAN ASSISTANT

## 2023-01-30 RX ORDER — METOPROLOL SUCCINATE 25 MG/1
TABLET, EXTENDED RELEASE ORAL
Qty: 84 TABLET | Refills: 0 | Status: SHIPPED | OUTPATIENT
Start: 2023-01-30 | End: 2023-03-13

## 2023-01-30 RX ORDER — AMLODIPINE BESYLATE 10 MG/1
10 TABLET ORAL DAILY
Qty: 90 TABLET | Refills: 1 | Status: SHIPPED | OUTPATIENT
Start: 2023-01-30 | End: 2023-04-24

## 2023-01-30 NOTE — PATIENT INSTRUCTIONS
Stop chlorthalidone and continue to check blood pressure once per day for the next 2 weeks.      If your blood pressure remain < 140/90 mmHg consistently, then I would suggest cutting back on the metroprolol as follows:     75 mg daily for 2 weeks.  If blood pressure is < 140/90 mmHg consistently then cut down to 50 mg daily for 2 weeks.    If blood pressure is < 140/90 mmHg consistently then cut down to 25 mg daily for 2 weeks.    If blood pressure is < 140/90 mmHg consistently then stop metoprolol.      Continue to hold off on coffee.

## 2023-01-30 NOTE — PROGRESS NOTES
"  Assessment & Plan     Hypertension goal BP (blood pressure) < 140/90  BP's have been controlled and she has made drastic changes to her diet and lifestyle.      Stop chlorthalidone and continue to check blood pressure once per day for the next 2 weeks.      If your blood pressure remain < 140/90 mmHg consistently, then I would suggest cutting back on the metroprolol as follows:     75 mg daily for 2 weeks.  If blood pressure is < 140/90 mmHg consistently then cut down to 50 mg daily for 2 weeks.    If blood pressure is < 140/90 mmHg consistently then cut down to 25 mg daily for 2 weeks.    If blood pressure is < 140/90 mmHg consistently then stop metoprolol.      Continue to hold off on coffee.        - Basic metabolic panel  (Ca, Cl, CO2, Creat, Gluc, K, Na, BUN); Future  - metoprolol succinate ER (TOPROL XL) 25 MG 24 hr tablet; Take 3 tablets (75 mg) by mouth daily for 14 days, THEN 2 tablets (50 mg) daily for 14 days, THEN 1 tablet (25 mg) daily for 14 days.  - amLODIPine (NORVASC) 10 MG tablet; Take 1 tablet (10 mg) by mouth daily    Morbid obesity (H)  Morbid obesity with co-morbidity: HTN  She has adopted a healthier lifestyle and working on getting off BP meds.     Hypokalemia  Was low with last set of labs.  She has increased her potassium intake.  Plan to stop chlorthalidone today, therefore will instead check labs in 2 months.         35 minutes spent on the date of the encounter doing chart review, history and exam, documentation and further activities per the note       BMI:   Estimated body mass index is 36.9 kg/m  as calculated from the following:    Height as of this encounter: 1.588 m (5' 2.5\").    Weight as of this encounter: 93 kg (205 lb).   Weight management plan: Discussed healthy diet and exercise guidelines        Return in about 2 months (around 3/30/2023) for nurse only BP recheck.    Aleida Carrera PA-C  [facility] VELIA Hudson is a 62 year old, " "presenting for the following health issues:  Recheck Medication      HPI     Patient with history of hypertension arrived for medication follow-up and to discuss lowering/stopping bp meds. Patient reports that she has cut out coffee from her diet, has avoided fast food except for [employer] x 2-3 years and her blood pressure readings from home have been improving.     Hypertension Follow-up      Do you check your blood pressure regularly outside of the clinic? Yes     Are you following a low salt diet? Yes    Are your blood pressures ever more than 140 on the top number (systolic) OR more   than 90 on the bottom number (diastolic), for example 140/90? No - Pt brought in paper copy of records averaging between 136/75 to 123/67.       How many servings of fruits and vegetables do you eat daily?  2-3    On average, how many sweetened beverages do you drink each day (Examples: soda, juice, sweet tea, etc.  Do NOT count diet or artificially sweetened beverages)?   0    How many days per week do you exercise enough to make your heart beat faster? 3 or less    How many minutes a day do you exercise enough to make your heart beat faster? 9 or less    How many days per week do you miss taking your medication? 0    She has drastically changed her diet and lifestyle, she is very interested in stopping medication.     Has not had any swelling in her legs and the red/orange tint has resolved.  She does wear compression stockings.             Review of Systems   Constitutional, HEENT, cardiovascular, pulmonary, gi and gu systems are negative, except as otherwise noted.      Objective    /76 (BP Location: Left arm, Patient Position: Chair, Cuff Size: Adult Large)   Pulse 74   Temp 97.7  F (36.5  C) (Tympanic)   Resp 16   Ht 1.588 m (5' 2.5\")   Wt 93 kg (205 lb)   LMP  (LMP Unknown)   SpO2 97%   BMI 36.90 kg/m    Body mass index is 36.9 kg/m .  Physical Exam   GENERAL: healthy, alert and no distress  NECK: no " adenopathy, no asymmetry, masses, or scars and thyroid normal to palpation  RESP: lungs clear to auscultation - no rales, rhonchi or wheezes  CV: regular rate and rhythm, normal S1 S2, no S3 or S4, no murmur, click or rub, no peripheral edema and peripheral pulses strong  MS: no gross musculoskeletal defects noted, no edema

## 2023-02-10 ENCOUNTER — TELEPHONE (OUTPATIENT)
Dept: FAMILY MEDICINE | Facility: CLINIC | Age: 63
End: 2023-02-10
Payer: COMMERCIAL

## 2023-02-10 NOTE — TELEPHONE ENCOUNTER
Called patient. No answer. Left message to call back.    The ancillary schedule is not available on Mondays at the [facility]. Patient can schedule a BP check at a nearby clinic if Mondays are the only days that work for her ([place] and [place] usually have openings). Please inform and help patient schedule when she returns call.    Neela Prince, CMA on 2/10/2023 at 11:23 AM

## 2023-02-10 NOTE — TELEPHONE ENCOUNTER
Patient has lab appt scheduled for 3/27/23. Patient also needs BP checked. Patient is only available on Mondays. Patient requesting 3/27/23 nurse appt also. Informed patient there is no schedule. Patient requested message to be sent to team. Ok to leave voicemail.

## 2023-02-13 NOTE — TELEPHONE ENCOUNTER
Left message for patient to call back. Kenyatta Griffin MA       See below. Patient can schedule at a different location if she needs Mondays. She can also schedule with the pharmacy. Kenyatta Griffin MA   [facility]

## 2023-02-14 NOTE — TELEPHONE ENCOUNTER
Patient has scheduled appointment at [place]. Kenyatta Griffin MA   [facility]

## 2023-02-19 ENCOUNTER — TELEPHONE (OUTPATIENT)
Dept: FAMILY MEDICINE | Facility: CLINIC | Age: 63
End: 2023-02-19
Payer: COMMERCIAL

## 2023-02-19 DIAGNOSIS — I10 HYPERTENSION GOAL BP (BLOOD PRESSURE) < 140/90: ICD-10-CM

## 2023-02-20 RX ORDER — METOPROLOL SUCCINATE 25 MG/1
TABLET, EXTENDED RELEASE ORAL
Qty: 70 TABLET | Refills: 1 | OUTPATIENT
Start: 2023-02-20

## 2023-02-20 NOTE — TELEPHONE ENCOUNTER
Patient was to titrate down. Please verify if she is still taking this and at what dose.     Thank you,  Aleida Mata RN

## 2023-02-20 NOTE — TELEPHONE ENCOUNTER
Called patient to follow up on metoprolol request.      Patient did not request refill; has metoprolol Rx from 1/30/2023; titrating as noted below from provider's office visit notes on 1/30/2023.    Currently patient is on 50 mg and will go down to 25 mg on 2/27/2023.  Patient doing well; BP today 126/68.    Cherie Jimenez RN      Per PCP 1/30/2023 office visit:  Hypertension goal BP (blood pressure) < 140/90  BP's have been controlled and she has made drastic changes to her diet and lifestyle.       Stop chlorthalidone and continue to check blood pressure once per day for the next 2 weeks.       If your blood pressure remain < 140/90 mmHg consistently, then I would suggest cutting back on the metroprolol as follows:      75 mg daily for 2 weeks.  If blood pressure is < 140/90 mmHg consistently then cut down to 50 mg daily for 2 weeks.    If blood pressure is < 140/90 mmHg consistently then cut down to 25 mg daily for 2 weeks.    If blood pressure is < 140/90 mmHg consistently then stop metoprolol.

## 2023-03-11 DIAGNOSIS — I10 HYPERTENSION GOAL BP (BLOOD PRESSURE) < 140/90: ICD-10-CM

## 2023-03-13 RX ORDER — METOPROLOL SUCCINATE 25 MG/1
25 TABLET, EXTENDED RELEASE ORAL DAILY
Qty: 30 TABLET | Refills: 1 | Status: SHIPPED | OUTPATIENT
Start: 2023-03-13 | End: 2023-04-24

## 2023-03-13 NOTE — TELEPHONE ENCOUNTER
We are tapering down, reviewed last message from 2/19/23 and doing well with lowering the dose.  Sent a shorter script for 25 mg tab    Aleida Carrera PA-C

## 2023-03-20 ENCOUNTER — ALLIED HEALTH/NURSE VISIT (OUTPATIENT)
Dept: FAMILY MEDICINE | Facility: CLINIC | Age: 63
End: 2023-03-20
Payer: COMMERCIAL

## 2023-03-20 VITALS — DIASTOLIC BLOOD PRESSURE: 88 MMHG | SYSTOLIC BLOOD PRESSURE: 144 MMHG

## 2023-03-20 DIAGNOSIS — I10 HYPERTENSION GOAL BP (BLOOD PRESSURE) < 140/90: Primary | ICD-10-CM

## 2023-03-20 PROCEDURE — 99207 PR NO CHARGE NURSE ONLY: CPT

## 2023-03-20 NOTE — PROGRESS NOTES
Patient said BP was high due to running around  She was to recheck at hime and call it it    It was 129/72

## 2023-03-20 NOTE — PROGRESS NOTES
I met with Caty Llamas at the request of Aleida Carrera PA-C  to recheck her blood pressure.  Blood pressure medications on the med list were reviewed with patient.    Patient has taken all medications as per usual regimen: No- Tuesday was the last day that she took the bp medicine   Patient reports tolerating them without any issues or concerns: Yes    Vitals:    03/20/23 1041 03/20/23 1053   BP: (!) 184/92 (!) 144/88       After 5 minutes, the patient's blood pressure remained greater than or equal to 140/90.    Is the patient currently having any chest pain? No  Does the patient currently have a headache? No  Does the patient currently have any vision changes? No  Does the patient currently have any nausea? No  Does the patient currently have any abdominal pain? No    The previous encounter was reviewed.  The patient was discharged and the note will be sent to the provider for final review.   PRAOMD Luis

## 2023-03-27 ENCOUNTER — LAB (OUTPATIENT)
Dept: LAB | Facility: CLINIC | Age: 63
End: 2023-03-27
Payer: COMMERCIAL

## 2023-03-27 DIAGNOSIS — I10 HYPERTENSION GOAL BP (BLOOD PRESSURE) < 140/90: ICD-10-CM

## 2023-03-27 PROCEDURE — 36415 COLL VENOUS BLD VENIPUNCTURE: CPT

## 2023-03-27 PROCEDURE — 80048 BASIC METABOLIC PNL TOTAL CA: CPT

## 2023-03-28 LAB
ANION GAP SERPL CALCULATED.3IONS-SCNC: 3 MMOL/L (ref 3–14)
BUN SERPL-MCNC: 20 MG/DL (ref 7–30)
CALCIUM SERPL-MCNC: 9.7 MG/DL (ref 8.5–10.1)
CHLORIDE BLD-SCNC: 110 MMOL/L (ref 94–109)
CO2 SERPL-SCNC: 31 MMOL/L (ref 20–32)
CREAT SERPL-MCNC: 0.9 MG/DL (ref 0.52–1.04)
GFR SERPL CREATININE-BSD FRML MDRD: 71 ML/MIN/1.73M2
GLUCOSE BLD-MCNC: 98 MG/DL (ref 70–99)
POTASSIUM BLD-SCNC: 4.2 MMOL/L (ref 3.4–5.3)
SODIUM SERPL-SCNC: 144 MMOL/L (ref 133–144)

## 2023-03-31 ENCOUNTER — TELEPHONE (OUTPATIENT)
Dept: FAMILY MEDICINE | Facility: CLINIC | Age: 63
End: 2023-03-31
Payer: COMMERCIAL

## 2023-04-03 ENCOUNTER — OFFICE VISIT (OUTPATIENT)
Dept: OTHER | Facility: CLINIC | Age: 63
End: 2023-04-03
Attending: PHYSICIAN ASSISTANT
Payer: COMMERCIAL

## 2023-04-03 VITALS
SYSTOLIC BLOOD PRESSURE: 158 MMHG | BODY MASS INDEX: 37.32 KG/M2 | HEIGHT: 63 IN | DIASTOLIC BLOOD PRESSURE: 72 MMHG | WEIGHT: 210.6 LBS | OXYGEN SATURATION: 96 % | HEART RATE: 78 BPM

## 2023-04-03 DIAGNOSIS — I87.2 PERIPHERAL VENOUS INSUFFICIENCY: ICD-10-CM

## 2023-04-03 DIAGNOSIS — I87.2 STASIS DERMATITIS OF BOTH LEGS: ICD-10-CM

## 2023-04-03 DIAGNOSIS — I10 HYPERTENSION GOAL BP (BLOOD PRESSURE) < 140/90: ICD-10-CM

## 2023-04-03 DIAGNOSIS — I10 BENIGN ESSENTIAL HYPERTENSION: ICD-10-CM

## 2023-04-03 PROCEDURE — 99213 OFFICE O/P EST LOW 20 MIN: CPT | Performed by: PHYSICIAN ASSISTANT

## 2023-04-03 PROCEDURE — 99212 OFFICE O/P EST SF 10 MIN: CPT | Performed by: PHYSICIAN ASSISTANT

## 2023-04-03 PROCEDURE — G0463 HOSPITAL OUTPT CLINIC VISIT: HCPCS

## 2023-04-03 RX ORDER — METOPROLOL SUCCINATE 25 MG/1
25 TABLET, EXTENDED RELEASE ORAL DAILY
Qty: 30 TABLET | Refills: 1 | Status: CANCELLED | OUTPATIENT
Start: 2023-04-03

## 2023-04-03 NOTE — PROGRESS NOTES
"Patient is here to discuss follow up    BP (!) 158/72 (BP Location: Right arm, Patient Position: Chair, Cuff Size: Adult Regular)   Pulse 78   Ht 5' 3\" (1.6 m)   Wt 210 lb 9.6 oz (95.5 kg)   LMP  (LMP Unknown)   SpO2 96%   BMI 37.31 kg/m      Questions patient would like addressed today are: N/A.    Refills are needed: No    Has homecare services and agency name:  [facility]    "

## 2023-04-03 NOTE — PROGRESS NOTES
[facility]  VASCULAR MEDICINE FOLLOW-UP VISIT      PRIMARY HEALTH CARE PROVIDER:  Aleida Carrera    REASON FOR VISIT:  Follow-up chronic venous insufficiency      HPI: Caty Llamas is a very pleasant 62 year old female with hypertension (just recently stopped medications due to excellent control of blood pressure and weight loss) and a family history of varicose veins who for many years had swelling in her lower extremities. She then eventually developed an orange/bronze discoloration in her lower legs. In April 2022 her brother gave her a pair of compression socks. She started wearing these and states her swelling and discoloration started to improve. She also has been working hard on losing weight and has lost 50 pounds this past year. She works from home but has been getting up during her breaks and doing her treadmill. She is also eating healthy.     She has been wearing knee-high compression socks daily for the past 6+ months and her leg swelling and bronze discoloration has significantly improved. In the past there were times her legs felt very heavy, almost like elephant trunks, but this has pretty much resolved with the use of compression socks. She is able to walk more and is generally pleased with her results.       PAST MEDICAL HISTORY  Past Medical History:   Diagnosis Date     Abnormal Pap smear of cervix 2011    see problem list     CARDIOVASCULAR SCREENING; LDL GOAL LESS THAN 160 5/9/2010     Cervical high risk HPV (human papillomavirus) test positive 03/27/2017, 2019    see problem list     Hx of colposcopy with cervical biopsy 2011    wnl     Hypertension goal BP (blood pressure) < 140/90 12/5/2011     Status post colposcopy 05/02/2017    ECC - negative for dysplasia       PAST SURGICAL HISTORY:  Past Surgical History:   Procedure Laterality Date     ARTHROSCOPY KNEE RT/LT  1988, 1994    left knee         CURRENT MEDICATIONS  amLODIPine (NORVASC) 10 MG tablet, Take 1  tablet (10 mg) by mouth daily (Patient not taking: Reported on 4/3/2023)  metoprolol succinate ER (TOPROL XL) 25 MG 24 hr tablet, Take 1 tablet (25 mg) by mouth daily (Patient not taking: Reported on 4/3/2023)    No current facility-administered medications on file prior to visit.      ALLERGIES     Allergies   Allergen Reactions     Butalbital-Aspirin-Caffeine      Salicylates      ferenol       FAMILY HISTORY  Family History   Problem Relation Age of Onset     C.A.D. Father      Cerebrovascular Disease Father      Cancer Father      Hypertension Father      Diabetes Sister      Hypertension Sister      Cerebrovascular Disease Sister      Diabetes Sister      Hypertension Sister      Cancer Sister         possibly kidney     Diabetes Brother        SOCIAL HISTORY  Social History     Socioeconomic History     Marital status: Single     Spouse name: Not on file     Number of children: Not on file     Years of education: Not on file     Highest education level: Not on file   Occupational History     Not on file   Tobacco Use     Smoking status: Never     Smokeless tobacco: Never     Tobacco comments:     smoke every blue moon   Substance and Sexual Activity     Alcohol use: Not Currently     Comment: very seldom     Drug use: No     Sexual activity: Not Currently   Other Topics Concern     Parent/sibling w/ CABG, MI or angioplasty before 65F 55M? Not Asked   Social History Narrative        Dairy/d 3 servings/d.     Caffeine 4-5 servings/d    Exercise 3-4 x week    Sunscreen used - No    Seatbelts used - Yes    Working smoke/CO detectors in the home - Yes    Guns stored in the home - No    Self Breast Exams - Yes    Self Testicular Exam - NA    Eye Exam up to date - No    Dental Exam up to date - No    Pap Smear up to date - No    Mammogram up to date - No    PSA up to date - NA    Dexa Scan up to date - No    Flex Sig / Colonoscopy up to date - No    Immunizations up to date - Yes    Abuse: Current or Past(Physical,  "Sexual or Emotional)- No    Do you feel safe in your environment - Yes    Tristian Miguelito CMA           ROS:   Complete ROS negative other than what is stated in HPI.     EXAM:  BP (!) 158/72 (BP Location: Right arm, Patient Position: Chair, Cuff Size: Adult Regular)   Pulse 78   Ht 5' 3\" (1.6 m)   Wt 210 lb 9.6 oz (95.5 kg)   LMP  (LMP Unknown)   SpO2 96%   BMI 37.31 kg/m    In general, the patient is a pleasant female in no apparent distress.    HEENT: NC/AT.  PERRLA.  EOMI.  Sclerae white, not injected.    Heart: RRR. Normal S1, S2 splits physiologically. No murmur, rub, click, or gallop.   Lungs: CTA.  No ronchi, wheezes, rales.    Extremities: No clubbing, cyanosis. Mild lower extremity edema bilaterally has resolved with decreased bronze discoloration compared to several months ago. No wounds. Varicose vein (bulging mildly) on right posterior thigh, non-tender.     Labs:  LIVER ENZYME RESULTS:  Lab Results   Component Value Date    AST 26 12/04/2008    ALT 21 12/04/2008       CBC RESULTS:  Lab Results   Component Value Date    WBC 7.5 01/15/2013    RBC 4.28 01/15/2013    HGB 13.3 01/15/2013    HCT 39.2 01/15/2013    MCV 92 01/15/2013    MCH 31.1 01/15/2013    MCHC 33.9 01/15/2013    RDW 13.6 01/15/2013     01/15/2013       BMP RESULTS:  Lab Results   Component Value Date     03/27/2023     05/10/2021    POTASSIUM 4.2 03/27/2023    POTASSIUM 3.9 05/10/2021    CHLORIDE 110 (H) 03/27/2023    CHLORIDE 102 05/10/2021    CO2 31 03/27/2023    CO2 32 05/10/2021    ANIONGAP 3 03/27/2023    ANIONGAP 3 05/10/2021    GLC 98 03/27/2023     (H) 05/10/2021    BUN 20 03/27/2023    BUN 17 05/10/2021    CR 0.90 03/27/2023    CR 0.92 05/10/2021    GFRESTIMATED 71 03/27/2023    GFRESTIMATED 66 05/10/2021    GFRESTBLACK 77 05/10/2021    LYNN 9.7 03/27/2023    LYNN 9.5 05/10/2021        THYROID RESULTS:  Lab Results   Component Value Date    TSH 0.69 06/06/2022    TSH 1.37 12/12/2012         Procedures: "   None    Assessment and Plan:   Venous insufficiency with venous stasis changes     -She had leg swelling and venous stasis changes for months with leg heaviness and fatigue.   -She has had significant improvement in swelling and stasis dermatitis with wearing compression socks (knee-high) and weight loss.   -She is active, exercising frequently.   -She has a family history of varicose veins.      Recommendations:  -Continue to wear compression socks (20-30 mmHg). She would ultimately benefit most from thigh high as she does have a larger bulging varicose vein on her right leg into her thigh, but given significant improvement in symptoms from knee-high socks, she wants to keep wearing these. She should wear these during the day and remove them at night.   -Continue to exercise and lose weight as able.   -Return to clinic PRN    Hypertension    -BP elevated in clinic, but she has been checking this at home daily. Her PCP just recently stopped her medications and is watching her closely. Advised her to follow-up with PCP or us if blood pressure becomes elevated at home (consistently >130/80).         Araceli Hays PA-C      25 minutes spent on the date of the encounter doing chart review, history and exam, documentation, and further activities as noted above.

## 2023-04-05 ENCOUNTER — TELEPHONE (OUTPATIENT)
Dept: FAMILY MEDICINE | Facility: CLINIC | Age: 63
End: 2023-04-05
Payer: COMMERCIAL

## 2023-04-05 NOTE — TELEPHONE ENCOUNTER
Left message on voice mail for patient to call clinic.   [phone]    According to current medication list, it is noted on 3/13/23 that patient is tapering off Metoprolol succinate ER (TOPROL XL) 25 MG 24 hr tablet.     Need to check with patient regarding this refill request.    Anahy Vieyra RN BSN  [facility]

## 2023-04-06 NOTE — TELEPHONE ENCOUNTER
I spoke with Becca by phone.     She reports that she has not been taking her BP medications.      She stopped taking both the AmLODIPine (NORVASC) 10 MG tablet & Metoprolol succinate ER (TOPROL XL) 25 MG 24 hr tablet around February 13.     Patient said she is feeling good and stopped drinking her daily coffee. She does have 1 cup of coffee each week. She believes that the anxiety of daily life can be a factor. She been doing some meditation and uses other coping skills.     Patient has not been checking her BP.     Becca plans to check her BP 2 times daily for the next 2 weeks (starting April 10). She has scheduled an appointment with provider to bring in her readings and discuss either staying off medication completely or taking a very low dose.       Future Office Visit:   Next 5 appointments (look out 90 days)    Apr 24, 2023 10:30 AM  (Arrive by 10:10 AM)  Provider Visit with Aleida Carrera PA-C  [facility] [address] [address] [facility] [address] [facility] [id number] [phone]           I spoke with Audrey at [facility] [place], [place] - [address] [facility] to let them know that patient is not taking the Metoprolol succinate ER (TOPROL XL) 25 MG 24 hr tablets at this time and a 90 day supply is not needed. She verbalized a good understanding.       Anahy Vieyra RN BSN  [facility]

## 2023-04-06 NOTE — TELEPHONE ENCOUNTER
Called [phone] (home). Did they answer the phone: No, left a message on voicemail to return call to the [facility] at [phone], and to ask for any available triage nurse.    Cori RN  Triage Nurse  [facility]: [facility]

## 2023-04-24 ENCOUNTER — OFFICE VISIT (OUTPATIENT)
Dept: FAMILY MEDICINE | Facility: CLINIC | Age: 63
End: 2023-04-24
Payer: COMMERCIAL

## 2023-04-24 VITALS
TEMPERATURE: 97.7 F | HEIGHT: 63 IN | BODY MASS INDEX: 37.03 KG/M2 | OXYGEN SATURATION: 99 % | RESPIRATION RATE: 16 BRPM | SYSTOLIC BLOOD PRESSURE: 134 MMHG | DIASTOLIC BLOOD PRESSURE: 74 MMHG | HEART RATE: 76 BPM | WEIGHT: 209 LBS

## 2023-04-24 DIAGNOSIS — Z53.20 COLON CANCER SCREENING DECLINED: ICD-10-CM

## 2023-04-24 DIAGNOSIS — I10 HYPERTENSION GOAL BP (BLOOD PRESSURE) < 140/90: Primary | ICD-10-CM

## 2023-04-24 PROCEDURE — 99214 OFFICE O/P EST MOD 30 MIN: CPT | Performed by: PHYSICIAN ASSISTANT

## 2023-04-24 RX ORDER — AMLODIPINE BESYLATE 10 MG/1
10 TABLET ORAL DAILY
Qty: 90 TABLET | Refills: 1 | Status: SHIPPED | OUTPATIENT
Start: 2023-04-24 | End: 2023-10-30

## 2023-04-24 RX ORDER — METOPROLOL SUCCINATE 50 MG/1
50 TABLET, EXTENDED RELEASE ORAL DAILY
Qty: 90 TABLET | Refills: 1 | Status: SHIPPED | OUTPATIENT
Start: 2023-04-24 | End: 2023-10-30

## 2023-04-24 ASSESSMENT — ASTHMA QUESTIONNAIRES
ACT_TOTALSCORE: 25
QUESTION_3 LAST FOUR WEEKS HOW OFTEN DID YOUR ASTHMA SYMPTOMS (WHEEZING, COUGHING, SHORTNESS OF BREATH, CHEST TIGHTNESS OR PAIN) WAKE YOU UP AT NIGHT OR EARLIER THAN USUAL IN THE MORNING: NOT AT ALL
ACT_TOTALSCORE: 25
QUESTION_5 LAST FOUR WEEKS HOW WOULD YOU RATE YOUR ASTHMA CONTROL: COMPLETELY CONTROLLED
QUESTION_1 LAST FOUR WEEKS HOW MUCH OF THE TIME DID YOUR ASTHMA KEEP YOU FROM GETTING AS MUCH DONE AT WORK, SCHOOL OR AT HOME: NONE OF THE TIME
QUESTION_4 LAST FOUR WEEKS HOW OFTEN HAVE YOU USED YOUR RESCUE INHALER OR NEBULIZER MEDICATION (SUCH AS ALBUTEROL): NOT AT ALL
QUESTION_2 LAST FOUR WEEKS HOW OFTEN HAVE YOU HAD SHORTNESS OF BREATH: NOT AT ALL

## 2023-04-24 NOTE — PROGRESS NOTES
Assessment & Plan     Hypertension goal BP (blood pressure) < 140/90  Reviewed home readings (which follow her taper off the medications).  When she was off meds and stress levels high, systolic BP's in the 140's.  She does feel better when on the medication, will restart the amlodipine at 10 mg and metoprolol 50 mg.   - amLODIPine (NORVASC) 10 MG tablet; Take 1 tablet (10 mg) by mouth daily  - metoprolol succinate ER (TOPROL XL) 50 MG 24 hr tablet; Take 1 tablet (50 mg) by mouth daily    Colon cancer screening declined  I discussed the importance of colorectal cancer screening, including the risks and benefits of the various procedures, including colonoscopy and cologuard.  Patient would like to wait until physical in June for me to order something (she is thinking colonoscopy).             20 minutes spent by me on the date of the encounter doing chart review, history and exam, documentation and further activities per the note       FUTURE APPOINTMENTS:       - Follow-up visit in 3 months for a physical.     Aleida Carrera PA-C  [facility] VELIA Hudson is a 63 year old, presenting for the following health issues:  Hypertension         View : No data to display.              Patient arrived to discuss her blood pressures and woulike to talk about possibly stopping bp medication. Patient explains she first was started on her bp medication mostly for her anxiety, she decided to slowly stop taking her bp medication and kept recordings of her home readings while doing this. Readings averaged between 130/75 to highest reading being 148/80 and pt explains higher readings might have been caused by outside factors, her roof caved in.     **Is currently taking bp medication and starting using Hemp flowers for her anxiety.     History of Present Illness       Hypertension: She presents for follow up of hypertension.  She does check blood pressure  regularly outside of the clinic.  "Outpatient blood pressures have not been over 140/90. She follows a low salt diet.     She eats 4 or more servings of fruits and vegetables daily.She consumes 0 sweetened beverage(s) daily.She exercises with enough effort to increase her heart rate 30 to 60 minutes per day.  She exercises with enough effort to increase her heart rate 5 days per week.   She is taking medications regularly.     She tapered off BP meds and levels were looking good.  She had some issues with more stress and noticed readings are going higher when she is more anxious.     She feels improvement in how she feels when she is taking the amlodipine and metoprolol.   Would like to restart amlodipine 10 mg and metoprolol 50 mg.   She is planning on completing a will soon.     Trying to stay active and take care of herself, she continues to eat a healthy diet.      Using hemp oil PRN for anxiety/stress.  This is working well and she has noticed improvement in sleep.     Prep has changed for colonoscopy and therefore she does not want to do it.                 Review of Systems   Constitutional, HEENT, cardiovascular, pulmonary, gi and gu systems are negative, except as otherwise noted.      Objective    /74 (BP Location: Left arm, Patient Position: Chair, Cuff Size: Adult Regular)   Pulse 76   Temp 97.7  F (36.5  C) (Tympanic)   Resp 16   Ht 1.6 m (5' 3\")   Wt 94.8 kg (209 lb)   LMP  (LMP Unknown)   SpO2 99%   BMI 37.02 kg/m    Body mass index is 37.02 kg/m .  Physical Exam   GENERAL: healthy, alert and no distress                    "

## 2023-10-28 DIAGNOSIS — I10 HYPERTENSION GOAL BP (BLOOD PRESSURE) < 140/90: ICD-10-CM

## 2023-10-30 RX ORDER — METOPROLOL SUCCINATE 50 MG/1
50 TABLET, EXTENDED RELEASE ORAL DAILY
Qty: 90 TABLET | Refills: 0 | Status: SHIPPED | OUTPATIENT
Start: 2023-10-30 | End: 2024-02-09

## 2023-10-30 RX ORDER — AMLODIPINE BESYLATE 10 MG/1
10 TABLET ORAL DAILY
Qty: 90 TABLET | Refills: 0 | Status: SHIPPED | OUTPATIENT
Start: 2023-10-30 | End: 2024-02-09

## 2023-11-06 DIAGNOSIS — I10 HYPERTENSION GOAL BP (BLOOD PRESSURE) < 140/90: ICD-10-CM

## 2023-11-06 RX ORDER — AMLODIPINE BESYLATE 10 MG/1
10 TABLET ORAL DAILY
Qty: 90 TABLET | Refills: 0 | Status: CANCELLED | OUTPATIENT
Start: 2023-11-06

## 2023-11-06 RX ORDER — METOPROLOL SUCCINATE 50 MG/1
50 TABLET, EXTENDED RELEASE ORAL DAILY
Qty: 90 TABLET | Refills: 0 | Status: CANCELLED | OUTPATIENT
Start: 2023-11-06

## 2023-11-06 NOTE — TELEPHONE ENCOUNTER
Refills sent for both of these for 90 day supply 10/30/23. Please direct patient to contact her pharmacy for refill.    Shahnaz Masterson RN

## 2023-11-06 NOTE — TELEPHONE ENCOUNTER
Patient requesting refill of metoprolol succinate ER (TOPROL XL) 50 MG 24 hr tablet, and amLODIPine (NORVASC) 10 MG tablet.

## 2023-11-08 NOTE — TELEPHONE ENCOUNTER
Spoke with patient, informed as below. Patient verbalized understanding and had no further questions or concerns.

## 2023-11-14 ENCOUNTER — NURSE TRIAGE (OUTPATIENT)
Dept: FAMILY MEDICINE | Facility: CLINIC | Age: 63
End: 2023-11-14
Payer: COMMERCIAL

## 2023-11-14 NOTE — TELEPHONE ENCOUNTER
Patient complaining of left sided neck pain since Sunday, is radiating to left upper shoulder area. Was in a car driving for over 4 hours round trip on Sunday. Doesn't normally sit this long in cars. Felt sore and stiff on Monday when woke up. Did her usual exercises and working out, lifted weights and feels this likely aggravated her neck pain and made worse. Pain is a 5/10 at it's worst. Comes and goes, depending on movement. Gets a sharp pain at times that shoots up to left upper neck, near ear area. Dull ache, otherwise. Is causing a mild, dull headache on left side of head but this is treated with Aleve.   Using Aspercreme to area and taking Aleve, this seems to be helping for now. Patient asking about using heat vs ice.    Home care advice given. Patient advised to be seen in person with persisting or worsening symptoms. Clinic visit vs urgent care visit discussed. Patient agreed to call back to speak with a nurse, if needed or has further questions.  Patient agreed with recommendations and plans.      Ayla Bean RN  Clinical Triage/Primary Care  [facility]      Reason for Disposition   Neck pain or stiffness    Additional Information   Negative: Followed an injury to neck (e.g., MVA, sports, impact or collision)   Negative: Chest pain   Negative: Lymph node in the neck is swollen or painful to the touch   Negative: Sore throat is main symptom   Negative: Shock suspected (e.g., cold/pale/clammy skin, too weak to stand, low BP, rapid pulse)   Negative: Similar pain previously and it was from 'heart attack'   Negative: Similar pain previously from 'angina' and not relieved by nitroglycerin   Negative: Difficult to awaken or acting confused (e.g., disoriented, slurred speech)   Negative: Sounds like a life-threatening emergency to the triager   Negative: Difficulty breathing or unusual sweating (e.g., sweating without exertion)   Negative: Chest pain lasting longer than 5 minutes    "Negative: Stiff neck (can't touch chin to chest) and has headache   Negative: Stiff neck (can't touch chin to chest) and fever   Negative: Weakness of an arm or hand   Negative: Problems with bowel or bladder control   Negative: Patient sounds very sick or weak to the triager   Negative: SEVERE pain (e.g., excruciating, unable to do any normal activities)   Negative: Head is twisting to one side (or ask 'is it turning against your will?')   Negative: Fever > 103 F (39.4 C)   Negative: Fever > 100.0 F (37.8 C) and Intravenous Drug Use (IVDU)   Negative: Fever > 100.0 F (37.8 C) and has diabetes mellitus or a weak immune system (e.g., HIV positive, cancer chemotherapy, organ transplant, splenectomy, chronic steroids)   Negative: Numbness in an arm or hand (i.e., loss of sensation)   Negative: Painful rash with multiple small blisters grouped together (i.e., dermatomal distribution or 'band' or 'stripe')   Negative: High-risk adult (e.g., history of cancer, HIV, or IV Drug Use)   Negative: Patient wants to be seen   Negative: Tenderness in front of neck over windpipe   Negative: Pain shoots (radiates) into arm or hand   Negative: Neck pain persists > 2 weeks   Negative: MODERATE neck pain (e.g., interferes with normal activities like work or school)    Answer Assessment - Initial Assessment Questions  1. ONSET: \"When did the pain begin?\"       Since Sunday  2. LOCATION: \"Where does it hurt?\"       Neck and shoulder, left side  3. PATTERN \"Does the pain come and go, or has it been constant since it started?\"       Comes and goes, depends on the movement  4. SEVERITY: \"How bad is the pain?\"  (Scale 1-10; or mild, moderate, severe)    - NO PAIN (0): no pain or only slight stiffness     - MILD (1-3): doesn't interfere with normal activities     - MODERATE (4-7): interferes with normal activities or awakens from sleep     - SEVERE (8-10):  excruciating pain, unable to do any normal activities       5/10 at it's worst  5. " "RADIATION: \"Does the pain go anywhere else, shoot into your arms?\"      Radiates to left shoulder  6. CORD SYMPTOMS: \"Any weakness or numbness of the arms or legs?\"      No  7. CAUSE: \"What do you think is causing the neck pain?\"      Thinks from sitting in a long car ride on Sunday  8. NECK OVERUSE: \"Any recent activities that involved turning or twisting the neck?\"      Was sitting in a car for long period of time, feels aggravated the neck or muscles.  9. OTHER SYMPTOMS: \"Do you have any other symptoms?\" (e.g., headache, fever, chest pain, difficulty breathing, neck swelling)      Dull, mild headache on left side. Shoulder feels tight. No chest pain or difficulty breathing.  10. PREGNANCY: \"Is there any chance you are pregnant?\" \"When was your last menstrual period?\"        N/A    Protocols used: Neck Pain or [id number]    "

## 2024-01-12 ENCOUNTER — OFFICE VISIT (OUTPATIENT)
Dept: URGENT CARE | Facility: URGENT CARE | Age: 64
End: 2024-01-12
Payer: COMMERCIAL

## 2024-01-12 ENCOUNTER — NURSE TRIAGE (OUTPATIENT)
Dept: FAMILY MEDICINE | Facility: CLINIC | Age: 64
End: 2024-01-12
Payer: COMMERCIAL

## 2024-01-12 VITALS
HEART RATE: 70 BPM | DIASTOLIC BLOOD PRESSURE: 82 MMHG | TEMPERATURE: 97.5 F | OXYGEN SATURATION: 98 % | BODY MASS INDEX: 38.01 KG/M2 | WEIGHT: 214.6 LBS | SYSTOLIC BLOOD PRESSURE: 160 MMHG

## 2024-01-12 DIAGNOSIS — F43.22 ADJUSTMENT DISORDER WITH ANXIOUS MOOD: ICD-10-CM

## 2024-01-12 DIAGNOSIS — H69.92 DYSFUNCTION OF LEFT EUSTACHIAN TUBE: ICD-10-CM

## 2024-01-12 DIAGNOSIS — R42 DIZZINESS: Primary | ICD-10-CM

## 2024-01-12 DIAGNOSIS — I10 HYPERTENSION GOAL BP (BLOOD PRESSURE) < 140/90: ICD-10-CM

## 2024-01-12 DIAGNOSIS — J06.9 VIRAL URI WITH COUGH: ICD-10-CM

## 2024-01-12 PROCEDURE — 36415 COLL VENOUS BLD VENIPUNCTURE: CPT | Performed by: PHYSICIAN ASSISTANT

## 2024-01-12 PROCEDURE — 99214 OFFICE O/P EST MOD 30 MIN: CPT | Mod: 25 | Performed by: PHYSICIAN ASSISTANT

## 2024-01-12 PROCEDURE — 80053 COMPREHEN METABOLIC PANEL: CPT | Performed by: PHYSICIAN ASSISTANT

## 2024-01-12 PROCEDURE — 93000 ELECTROCARDIOGRAM COMPLETE: CPT | Performed by: PHYSICIAN ASSISTANT

## 2024-01-12 NOTE — LETTER
[facility] URGENT [facility]ARE [place]  [address] [facility] [address]  Phone: [phone]    January 12, 2024        Caty Llamas  [address] [place] [address]          To whom it may concern:    RE: Caty Llamas    Patient was seen and treated today at our clinic.  Please excuse her from work missed January 12th 13th and 14th 2024.    Please contact me for questions or concerns.      Sincerely,      Kavita Wilks

## 2024-01-12 NOTE — PATIENT INSTRUCTIONS
Drink plenty of fluids  Rest  Continue medications  Be seen in ED dizziness significantly worsens or by primary care provider does not improve after 2 more weeks

## 2024-01-12 NOTE — TELEPHONE ENCOUNTER
Luzma reports flu like symptoms; mainly body aches and fatigue - afebrile    Started about one week ago     Yesterday nausea; feeling lightheaded today; changing position causes dizziness    Otherwise dizziness not present     Patient does take BP medications; is not aware of what her BP is     Appointments with PCP not available for 2 weeks; patient declines seeing another provider    Advised UC; patient verbalized understanding and will go to [facility]  Reason for Disposition   MODERATE dizziness (e.g., interferes with normal activities)  (Exception: Dizziness caused by heat exposure, sudden standing, or poor fluid intake.)    Additional Information   Negative: SEVERE difficulty breathing (e.g., struggling for each breath, speaks in single words)   Negative: Shock suspected (e.g., cold/pale/clammy skin, too weak to stand, low BP, rapid pulse)   Negative: Difficult to awaken or acting confused (e.g., disoriented, slurred speech)   Negative: Fainted, and still feels dizzy afterwards   Negative: Overdose (accidental or intentional) of medications   Negative: New neurologic deficit that is present now: * Weakness of the face, arm, or leg on one side of the body * Numbness of the face, arm, or leg on one side of the body * Loss of speech or garbled speech   Negative: Heart beating < 50 beats per minute OR > 140 beats per minute   Negative: Sounds like a life-threatening emergency to the triager   Negative: Chest pain   Negative: Rectal bleeding, bloody stool, or tarry-black stool   Negative: Vomiting is main symptom   Negative: Diarrhea is main symptom   Negative: Headache is main symptom   Negative: Heat exhaustion suspected (i.e., dehydration from heat exposure)   Negative: Patient states that they are having an anxiety or panic attack   Negative: Dizziness from low blood sugar (i.e., < 60 mg/dl or 3.5 mmol/l)   Negative: SEVERE dizziness (e.g., unable to stand, requires support to walk, feels like passing out  now)   Negative: SEVERE headache or neck pain   Negative: Spinning or tilting sensation (vertigo) present now and one or more stroke risk factors (i.e., hypertension, diabetes mellitus, prior stroke/TIA, heart attack, age over 60) (Exception: Prior physician evaluation for this AND no different/worse than usual.)   Negative: Neurologic deficit that was brief (now gone), ANY of the following:* Weakness of the face, arm, or leg on one side of the body* Numbness of the face, arm, or leg on one side of the body* Loss of speech or garbled speech   Negative: Loss of vision or double vision  (Exception: Similar to previous migraines.)   Negative: Extra heartbeats, irregular heart beating, or heart is beating very fast (i.e., 'palpitations')   Negative: Difficulty breathing   Negative: Drinking very little and dehydration suspected (e.g., no urine > 12 hours, very dry mouth, very lightheaded)   Negative: Follows bleeding (e.g., stomach, rectum, vagina)  (Exception: Became dizzy from sight of small amount blood.)   Negative: Patient sounds very sick or weak to the triager   Negative: Lightheadedness (dizziness) present now, after 2 hours of rest and fluids   Negative: Spinning or tilting sensation (vertigo) present now   Negative: Fever > 103 F (39.4 C)   Negative: Fever > 100.0 F (37.8 C) and has diabetes mellitus or a weak immune system (e.g., HIV positive, cancer chemotherapy, organ transplant, splenectomy, chronic steroids)    Protocols used: Dizziness-A-OH    Maria L New RN  [facility]

## 2024-01-12 NOTE — PROGRESS NOTES
Assessment & Plan     Dizziness  - EKG 12-lead complete w/read - Clinics  - Comprehensive metabolic panel (BMP + Alb, Alk Phos, ALT, AST, Total. Bili, TP); Future    Viral URI with cough  - Comprehensive metabolic panel (BMP + Alb, Alk Phos, ALT, AST, Total. Bili, TP); Future    Dysfunction of left eustachian tube    Hypertension goal BP (blood pressure) < 140/90  Elevated today. Continue amlodipine and metoprolol, monitor blood pressure at home once daily    Adjustment disorder with anxious mood      EKG normal, CMP in process.  We discussed Flonase to help encourage eustachian tubes to remain patent.    Return in about 1 week (around 1/19/2024) for Physical Exam with primary care provider.     Kavita Wilks PA-C  [facility]    Subjective   Cayt Llamas is a 63 year old who presents for the following health issues     Patient presents with:  Cough: Currently dry cough, started 12/3/23 sx worsened, pt took Robitussin DM to help with cough  Dizziness: Noticing dizziness, when getting up in the morning, putting on shoes, make a sudden movement       HPI    Becca presents clinic today for evaluation of dizziness.  She states that she had a cough that started about a little over a week ago.  It was at its worst last Friday.  She went home from work because she was not feeling well.  She took some Robitussin DM which did help significantly to control her cough.  She took 2 doses in 1 day and thought she maybe felt a little dizzy with that.  Her cough has gotten quite a bit better and today she has not taken any medications and has not coughed.  However her dizziness, is not improving.  She feels dizzy with movements especially turning her head quickly or changing positions.  Dizziness does seem to be fairly intermittent.  She describes this as feeling more like being on a boat versus the room spinning.  She has still been able to go about all of her normal daily activities. She did take  her blood pressure meds this morning.    Review of Systems   ROS negative except as stated above.      Objective    BP (!) 160/82   Pulse 70   Temp 97.5  F (36.4  C) (Tympanic)   Wt 97.3 kg (214 lb 9.6 oz)   LMP  (LMP Unknown)   SpO2 98%   BMI 38.01 kg/m    Physical Exam   GENERAL: healthy, alert and no distress  EYES: Eyes grossly normal to inspection, PERRL and conjunctivae and sclerae normal  HENT: ear canals normal, questionable minimal serous effusion behind left tympanic membrane, no erythema TM is not bulging.  Right tympanic membrane gray with good cone of light no effusion noted.  Nose and mouth without ulcers or lesions  NECK: no adenopathy, no asymmetry, masses, or scars and thyroid normal to palpation  RESP: lungs clear to auscultation - no rales, rhonchi or wheezes  CV: regular rate and rhythm, normal S1 S2, no S3 or S4, no murmur, click or rub, no peripheral edema and peripheral pulses strong    No results found for any visits on 01/12/24.

## 2024-01-13 LAB
ALBUMIN SERPL BCG-MCNC: 4.5 G/DL (ref 3.5–5.2)
ALP SERPL-CCNC: 92 U/L (ref 40–150)
ALT SERPL W P-5'-P-CCNC: 28 U/L (ref 0–50)
ANION GAP SERPL CALCULATED.3IONS-SCNC: 11 MMOL/L (ref 7–15)
AST SERPL W P-5'-P-CCNC: 25 U/L (ref 0–45)
BILIRUB SERPL-MCNC: 0.6 MG/DL
BUN SERPL-MCNC: 12.6 MG/DL (ref 8–23)
CALCIUM SERPL-MCNC: 9.5 MG/DL (ref 8.8–10.2)
CHLORIDE SERPL-SCNC: 104 MMOL/L (ref 98–107)
CREAT SERPL-MCNC: 0.71 MG/DL (ref 0.51–0.95)
DEPRECATED HCO3 PLAS-SCNC: 26 MMOL/L (ref 22–29)
EGFRCR SERPLBLD CKD-EPI 2021: >90 ML/MIN/1.73M2
GLUCOSE SERPL-MCNC: 105 MG/DL (ref 70–99)
POTASSIUM SERPL-SCNC: 3.6 MMOL/L (ref 3.4–5.3)
PROT SERPL-MCNC: 7.7 G/DL (ref 6.4–8.3)
SODIUM SERPL-SCNC: 141 MMOL/L (ref 135–145)

## 2024-01-19 ENCOUNTER — OFFICE VISIT (OUTPATIENT)
Dept: URGENT CARE | Facility: URGENT CARE | Age: 64
End: 2024-01-19
Payer: COMMERCIAL

## 2024-01-19 VITALS
WEIGHT: 213 LBS | HEART RATE: 73 BPM | OXYGEN SATURATION: 97 % | BODY MASS INDEX: 37.73 KG/M2 | TEMPERATURE: 97.5 F | SYSTOLIC BLOOD PRESSURE: 146 MMHG | DIASTOLIC BLOOD PRESSURE: 86 MMHG | RESPIRATION RATE: 16 BRPM

## 2024-01-19 DIAGNOSIS — R42 VERTIGO: Primary | ICD-10-CM

## 2024-01-19 PROCEDURE — 99213 OFFICE O/P EST LOW 20 MIN: CPT

## 2024-01-19 RX ORDER — ONDANSETRON 4 MG/1
4 TABLET, ORALLY DISINTEGRATING ORAL EVERY 8 HOURS PRN
Qty: 12 TABLET | Refills: 0 | Status: SHIPPED | OUTPATIENT
Start: 2024-01-19

## 2024-01-19 RX ORDER — MECLIZINE HCL 12.5 MG 12.5 MG/1
12.5 TABLET ORAL 3 TIMES DAILY PRN
Qty: 12 TABLET | Refills: 0 | Status: SHIPPED | OUTPATIENT
Start: 2024-01-19 | End: 2024-02-26

## 2024-01-19 ASSESSMENT — PAIN SCALES - GENERAL: PAINLEVEL: NO PAIN (0)

## 2024-01-19 ASSESSMENT — ENCOUNTER SYMPTOMS
DIAPHORESIS: 0
FACIAL ASYMMETRY: 0
NAUSEA: 1
PSYCHIATRIC NEGATIVE: 1
CHILLS: 0
TREMORS: 0
DIZZINESS: 1
SPEECH DIFFICULTY: 0
NUMBNESS: 0
WEAKNESS: 0
CARDIOVASCULAR NEGATIVE: 1
DIARRHEA: 0
HEADACHES: 0
FEVER: 0
FATIGUE: 0
VOMITING: 0
APPETITE CHANGE: 0
SEIZURES: 0
LIGHT-HEADEDNESS: 0
RESPIRATORY NEGATIVE: 1
CONSTITUTIONAL NEGATIVE: 1
ACTIVITY CHANGE: 0
UNEXPECTED WEIGHT CHANGE: 0

## 2024-01-19 NOTE — LETTER
January 19, 2024      Caty Llamas  [address]  [place] [address]        To Whom It May Concern:    Caty Llamas  was seen on 1/19/2024.  Please excuse her  until 1/23/2024 due to illness.        Sincerely,        ASCENCION Pacheco CNP

## 2024-01-20 NOTE — PATIENT INSTRUCTIONS
Try zofran 4 mg by mouth every 8 hours as needed for nausea.     Try meclizine 12.5 mg by mouth up to three times per day as needed for dizziness.     Please follow up with vestibular physical therapy. I have placed an order for this and they should be in touch with you within the next week.     Go to the ER if you experience: difficulty walking due to feeling off balanced, visual disturbances, hearing disturbances, weakness in arms or legs, severe headaches.

## 2024-01-20 NOTE — PROGRESS NOTES
Patient presents with:  Urgent Care  Dizziness  Ear Problem      Clinical Decision Makin-year-old female, well, nontoxic appearing presenting with persisting dizziness with nausea since 2024, following of viral URI.  Although she does feel she is overall improving, dizziness and nausea are still bothersome, and she presents to see if there is anything else to help with symptoms.  Reassuringly, without postural instability, without changes in hearing or tinnitus, without visual disturbances, without ataxia, without dysarthria, without focal or lateralized weakness -I therefore do not suspect central vertigo cause, and have suspicion for peripheral vertigo cause such as post viral vestibular neuritis - which would be consistent with pt's reporting history of more severe vestibular symptoms which she reports has gradually improved.  Furthermore, exam is reassuringly unremarkable, without gross or focal neurodeficits-patient is without nystagmus at rest, and without nystagmus with provoking maneuvers.  Therefore, recommended symptomatic treatment with Zofran and meclizine as needed, and follow-up with vestibular PT.  Recommended follow-up with PCP if no improvement or worsening in the symptoms.  Patient verbalized understanding and agreeable with plan.    At the end of the encounter, I discussed results, diagnosis, medications. Discussed red flags for immediate return to clinic/ER, as well as indications for follow up if no improvement. Patient understood and agreed to plan. Patient was stable for discharge.    ICD-10-CM    1. Vertigo  R42 Physical Therapy Referral     ondansetron (ZOFRAN ODT) 4 MG ODT tab     meclizine (ANTIVERT) 12.5 MG tablet          Patient Instructions   Try zofran 4 mg by mouth every 8 hours as needed for nausea.     Try meclizine 12.5 mg by mouth up to three times per day as needed for dizziness.     Please follow up with vestibular physical therapy. I have placed an order for this and  they should be in touch with you within the next week.     Go to the ER if you experience: difficulty walking due to feeling off balanced, visual disturbances, hearing disturbances, weakness in arms or legs, severe headaches.     HPI:  Caty Llamas is a 63 year old female who presents today with persisting dizziness and nausea following viral uri.     Seen for dizziness, viral URI with cough, dysfunction of left eustachian tube 1/12/24 per chart review - work up including EKG and CMP all unremarkable, treated conservatively.     Dizziness with nausea - since 1/9/2024, improved but not entirely resovled. Does not feeling like it is worsening, thinks it has been getting better. 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 Reports feeling well and healthy, viral URI she feels has resolved but dizziness and nausea still present. Reports dizziness and nausea is only present when she is at work, looking at screens - moving her head back and forth between screens, once she is home she is feeling fine, without nausea and dizziness. Reports it does not feel like the room is spinning. Has had vertigo in the past and reports it does feel similar.     Reports she has been using Flonase spray. Reports improved viral uri symptoms since visit on 1/12/24.    Denies chest pain.   Denies SOB.   Denies diaphoresis.   Denies fevers/chills.   Denies headaches.   Denies pre syncopal/syncopal events.   Denies falls/trauma.      BP elevated - pt reports it is normally elevated when she comes to clinic. Does not check BP at home.    History obtained from the patient.    Problem List:  2022-06: Primary osteoarthritis of right shoulder  2022-06: Trigger finger, acquired  2022-06: Primary osteoarthritis of both hands  2021-05: Primary osteoarthritis of left knee  2021-05: Prediabetes  2021-04: H/O hiatal hernia  2021-04: Mild intermittent asthma without complication  2021-04: Hyperlipidemia LDL goal  <160  2017-12: Acute pain of left shoulder  2016-11: Adjustment disorder with anxious mood  2016-03: Morbid obesity (H)  2013-03: Advanced directives, HC packet sent 3/5/2013  2013-02: Shoulder pain, right  2011-12: BV (bacterial vaginosis)  2011-12: Cervical high risk HPV (human papillomavirus) test positive  2011-12: Hypertension goal BP (blood pressure) < 140/90  2010-05: CARDIOVASCULAR SCREENING; LDL GOAL LESS THAN 160  2008-07: Essential hypertension, benign  2008-07: Onychomycosis bilat great toenails.   2006-02: S/P medial meniscus repair of left knee  2006-01: Essential hypertension, benign  2005-09: Pain in joint, lower leg      Past Medical History:   Diagnosis Date    Abnormal Pap smear of cervix 2011    see problem list    CARDIOVASCULAR SCREENING; LDL GOAL LESS THAN 160 5/9/2010    Cervical high risk HPV (human papillomavirus) test positive 03/27/2017, 2019    see problem list    Hx of colposcopy with cervical biopsy 2011    [facility]    Hypertension goal BP (blood pressure) < 140/90 12/5/2011    Status post colposcopy 05/02/2017    ECC - negative for dysplasia       Social History     Tobacco Use    Smoking status: Never    Smokeless tobacco: Never    Tobacco comments:     smoke every blue moon   Substance Use Topics    Alcohol use: Yes     Comment: very occ       Review of Systems   Constitutional: Negative.  Negative for activity change, appetite change, chills, diaphoresis, fatigue, fever and unexpected weight change.   HENT: Negative.  Negative for ear pain and tinnitus.    Respiratory: Negative.     Cardiovascular: Negative.    Gastrointestinal:  Positive for nausea. Negative for diarrhea and vomiting.   Neurological:  Positive for dizziness. Negative for tremors, seizures, syncope, facial asymmetry, speech difficulty, weakness, light-headedness, numbness and headaches.   Psychiatric/Behavioral: Negative.         Vitals:    01/19/24 1806   BP: (!) 146/86   Pulse: 73   Resp: 16   Temp: 97.5  F (36.4  C)    TempSrc: Tympanic   SpO2: 97%   Weight: 96.6 kg (213 lb)       Physical Exam  Constitutional:       General: She is not in acute distress.     Appearance: Normal appearance. She is not ill-appearing, toxic-appearing or diaphoretic.   HENT:      Head: Normocephalic and atraumatic.      Right Ear: Ear canal and external ear normal. A middle ear effusion is present. There is no impacted cerumen. Tympanic membrane is not perforated, erythematous or bulging.      Left Ear: Ear canal and external ear normal. A middle ear effusion is present. There is no impacted cerumen. Tympanic membrane is not perforated, erythematous or bulging.      Ears:      Comments: Bilateral clear middle ear effusions.     Nose: Nose normal.      Mouth/Throat:      Mouth: Mucous membranes are moist.      Pharynx: No oropharyngeal exudate or posterior oropharyngeal erythema.   Eyes:      General: No scleral icterus.        Right eye: No discharge.         Left eye: No discharge.      Extraocular Movements: Extraocular movements intact.      Conjunctiva/sclera: Conjunctivae normal.      Pupils: Pupils are equal, round, and reactive to light.   Cardiovascular:      Rate and Rhythm: Normal rate and regular rhythm.      Heart sounds: Normal heart sounds. No murmur heard.     No friction rub. No gallop.   Pulmonary:      Effort: Pulmonary effort is normal. No respiratory distress.      Breath sounds: Normal breath sounds. No stridor. No wheezing, rhonchi or rales.   Chest:      Chest wall: No tenderness.   Musculoskeletal:      Cervical back: Normal range of motion and neck supple. No rigidity or tenderness.   Lymphadenopathy:      Cervical: No cervical adenopathy.   Skin:     General: Skin is warm.      Capillary Refill: Capillary refill takes less than 2 seconds.   Neurological:      General: No focal deficit present.      Mental Status: She is alert and oriented to person, place, and time.      GCS: GCS eye subscore is 4. GCS verbal subscore is 5.  GCS motor subscore is 6.      Cranial Nerves: No cranial nerve deficit.      Sensory: Sensation is intact. No sensory deficit.      Motor: Motor function is intact. No weakness or pronator drift.      Coordination: Coordination normal. Finger-Nose-Finger Test and Heel to Shin Test normal.      Gait: Gait normal.      Comments: No nystagmus on exam, at rest, or with provoking maneuvers -including Vandemere-Hallpike maneuver and modified Epley maneuver.   Psychiatric:         Mood and Affect: Mood normal.         Behavior: Behavior normal.         Thought Content: Thought content normal.         Judgment: Judgment normal.

## 2024-02-07 DIAGNOSIS — I10 HYPERTENSION GOAL BP (BLOOD PRESSURE) < 140/90: ICD-10-CM

## 2024-02-09 RX ORDER — AMLODIPINE BESYLATE 10 MG/1
10 TABLET ORAL DAILY
Qty: 30 TABLET | Refills: 0 | Status: SHIPPED | OUTPATIENT
Start: 2024-02-09 | End: 2024-02-26

## 2024-02-09 RX ORDER — METOPROLOL SUCCINATE 50 MG/1
50 TABLET, EXTENDED RELEASE ORAL DAILY
Qty: 30 TABLET | Refills: 0 | Status: SHIPPED | OUTPATIENT
Start: 2024-02-09 | End: 2024-02-26

## 2024-02-26 ENCOUNTER — OFFICE VISIT (OUTPATIENT)
Dept: FAMILY MEDICINE | Facility: CLINIC | Age: 64
End: 2024-02-26
Payer: COMMERCIAL

## 2024-02-26 VITALS
BODY MASS INDEX: 37.54 KG/M2 | WEIGHT: 204 LBS | SYSTOLIC BLOOD PRESSURE: 140 MMHG | HEIGHT: 62 IN | TEMPERATURE: 97.1 F | DIASTOLIC BLOOD PRESSURE: 78 MMHG | OXYGEN SATURATION: 99 % | RESPIRATION RATE: 16 BRPM | HEART RATE: 65 BPM

## 2024-02-26 DIAGNOSIS — Z00.00 ROUTINE GENERAL MEDICAL EXAMINATION AT A HEALTH CARE FACILITY: Primary | ICD-10-CM

## 2024-02-26 DIAGNOSIS — J45.20 MILD INTERMITTENT ASTHMA WITHOUT COMPLICATION: ICD-10-CM

## 2024-02-26 DIAGNOSIS — G56.22 ULNAR NEUROPATHY OF LEFT UPPER EXTREMITY: ICD-10-CM

## 2024-02-26 DIAGNOSIS — I10 HYPERTENSION GOAL BP (BLOOD PRESSURE) < 140/90: ICD-10-CM

## 2024-02-26 DIAGNOSIS — E66.01 MORBID OBESITY (H): ICD-10-CM

## 2024-02-26 DIAGNOSIS — Z12.11 SCREEN FOR COLON CANCER: ICD-10-CM

## 2024-02-26 DIAGNOSIS — Z12.31 ENCOUNTER FOR SCREENING MAMMOGRAM FOR BREAST CANCER: ICD-10-CM

## 2024-02-26 DIAGNOSIS — Z12.4 CERVICAL CANCER SCREENING: ICD-10-CM

## 2024-02-26 DIAGNOSIS — Z98.890 S/P MEDIAL MENISCUS REPAIR OF LEFT KNEE: ICD-10-CM

## 2024-02-26 DIAGNOSIS — Z98.890 S/P LEFT KNEE SURGERY: ICD-10-CM

## 2024-02-26 LAB
CREAT UR-MCNC: 48.2 MG/DL
MICROALBUMIN UR-MCNC: <12 MG/L
MICROALBUMIN/CREAT UR: NORMAL MG/G{CREAT}

## 2024-02-26 PROCEDURE — G0145 SCR C/V CYTO,THINLAYER,RESCR: HCPCS | Performed by: PHYSICIAN ASSISTANT

## 2024-02-26 PROCEDURE — 99397 PER PM REEVAL EST PAT 65+ YR: CPT | Mod: 25 | Performed by: PHYSICIAN ASSISTANT

## 2024-02-26 PROCEDURE — 90471 IMMUNIZATION ADMIN: CPT | Performed by: PHYSICIAN ASSISTANT

## 2024-02-26 PROCEDURE — 87624 HPV HI-RISK TYP POOLED RSLT: CPT | Performed by: PHYSICIAN ASSISTANT

## 2024-02-26 PROCEDURE — 82570 ASSAY OF URINE CREATININE: CPT | Performed by: PHYSICIAN ASSISTANT

## 2024-02-26 PROCEDURE — 99214 OFFICE O/P EST MOD 30 MIN: CPT | Mod: 25 | Performed by: PHYSICIAN ASSISTANT

## 2024-02-26 PROCEDURE — 90677 PCV20 VACCINE IM: CPT | Performed by: PHYSICIAN ASSISTANT

## 2024-02-26 PROCEDURE — 82043 UR ALBUMIN QUANTITATIVE: CPT | Performed by: PHYSICIAN ASSISTANT

## 2024-02-26 RX ORDER — AMLODIPINE BESYLATE 10 MG/1
10 TABLET ORAL DAILY
Qty: 90 TABLET | Refills: 1 | Status: SHIPPED | OUTPATIENT
Start: 2024-02-26 | End: 2024-09-17

## 2024-02-26 RX ORDER — METOPROLOL SUCCINATE 50 MG/1
50 TABLET, EXTENDED RELEASE ORAL DAILY
Qty: 90 TABLET | Refills: 1 | Status: SHIPPED | OUTPATIENT
Start: 2024-02-26 | End: 2024-09-17

## 2024-02-26 SDOH — HEALTH STABILITY: PHYSICAL HEALTH: ON AVERAGE, HOW MANY DAYS PER WEEK DO YOU ENGAGE IN MODERATE TO STRENUOUS EXERCISE (LIKE A BRISK WALK)?: 4 DAYS

## 2024-02-26 ASSESSMENT — ASTHMA QUESTIONNAIRES
QUESTION_4 LAST FOUR WEEKS HOW OFTEN HAVE YOU USED YOUR RESCUE INHALER OR NEBULIZER MEDICATION (SUCH AS ALBUTEROL): NOT AT ALL
QUESTION_5 LAST FOUR WEEKS HOW WOULD YOU RATE YOUR ASTHMA CONTROL: COMPLETELY CONTROLLED
QUESTION_2 LAST FOUR WEEKS HOW OFTEN HAVE YOU HAD SHORTNESS OF BREATH: NOT AT ALL
ACT_TOTALSCORE: 25
QUESTION_1 LAST FOUR WEEKS HOW MUCH OF THE TIME DID YOUR ASTHMA KEEP YOU FROM GETTING AS MUCH DONE AT WORK, SCHOOL OR AT HOME: NONE OF THE TIME
QUESTION_3 LAST FOUR WEEKS HOW OFTEN DID YOUR ASTHMA SYMPTOMS (WHEEZING, COUGHING, SHORTNESS OF BREATH, CHEST TIGHTNESS OR PAIN) WAKE YOU UP AT NIGHT OR EARLIER THAN USUAL IN THE MORNING: NOT AT ALL
ACT_TOTALSCORE: 25

## 2024-02-26 ASSESSMENT — SOCIAL DETERMINANTS OF HEALTH (SDOH): HOW OFTEN DO YOU GET TOGETHER WITH FRIENDS OR RELATIVES?: TWICE A WEEK

## 2024-02-26 NOTE — PATIENT INSTRUCTIONS
Please call [phone] to schedule the mammogram    I recommend the RSV and Shingles shot at the pharmacy.   Recommend flu and covid shot at pharmacy as well.     Monitor home blood pressures and if over 140/90 mmHg, reach out (we may want to restart a diuretic, such as hydrochlorothiazide).           Preventive Care Advice   This is general advice given by our system to help you stay healthy. However, your care team may have specific advice just for you. Please talk to your care team about your preventive care needs.  Nutrition  Eat 5 or more servings of fruits and vegetables each day.  Try wheat bread, brown rice and whole grain pasta (instead of white bread, rice, and pasta).  Get enough calcium and vitamin D. Check the label on foods and aim for 100% of the RDA (recommended daily allowance).  Lifestyle  Exercise at least 150 minutes each week  (30 minutes a day, 5 days a week).  Do muscle strengthening activities 2 days a week. These help control your weight and prevent disease.  No smoking.  Wear sunscreen to prevent skin cancer.  Have a dental exam and cleaning every 6 months.  Yearly exams  See your health care team every year to talk about:  Any changes in your health.  Any medicines your care team has prescribed.  Preventive care, family planning, and ways to prevent chronic diseases.  Shots (vaccines)   HPV shots (up to age 26), if you've never had them before.  Hepatitis B shots (up to age 59), if you've never had them before.  COVID-19 shot: Get this shot when it's due.  Flu shot: Get a flu shot every year.  Tetanus shot: Get a tetanus shot every 10 years.  Pneumococcal, hepatitis A, and RSV shots: Ask your care team if you need these based on your risk.  Shingles shot (for age 50 and up)  General health tests  Diabetes screening:  Starting at age 35, Get screened for diabetes at least every 3 years.  If you are younger than age 35, ask your care team if you should be screened for  diabetes.  Cholesterol test: At age 39, start having a cholesterol test every 5 years, or more often if advised.  Bone density scan (DEXA): At age 50, ask your care team if you should have this scan for osteoporosis (brittle bones).  Hepatitis C: Get tested at least once in your life.  STIs (sexually transmitted infections)  Before age 24: Ask your care team if you should be screened for STIs.  After age 24: Get screened for STIs if you're at risk. You are at risk for STIs (including HIV) if:  You are sexually active with more than one person.  You don't use condoms every time.  You or a partner was diagnosed with a sexually transmitted infection.  If you are at risk for HIV, ask about PrEP medicine to prevent HIV.  Get tested for HIV at least once in your life, whether you are at risk for HIV or not.  Cancer screening tests  Cervical cancer screening: If you have a cervix, begin getting regular cervical cancer screening tests starting at age 21.  Breast cancer scan (mammogram): If you've ever had breasts, begin having regular mammograms starting at age 40. This is a scan to check for breast cancer.  Colon cancer screening: It is important to start screening for colon cancer at age 45.  Have a colonoscopy test every 10 years (or more often if you're at risk) Or, ask your provider about stool tests like a FIT test every year or Cologuard test every 3 years.  To learn more about your testing options, visit:   https://www.[place]/974874.pdf.  For help making a decision, visit:   https://bit.ly/zh04534.  Prostate cancer screening test: If you have a prostate, ask your care team if a prostate cancer screening test (PSA) at age 55 is right for you.  Lung cancer screening: If you are a current or former smoker ages 50 to 80, ask your care team if ongoing lung cancer screenings are right for you.  For informational purposes only. Not to replace the advice of your health care provider. Copyright   2023 [employer]. All rights reserved. Clinically reviewed by the [facility] Transitions Program. [employer] [id number] - REV 01/24.

## 2024-02-26 NOTE — LETTER
February 27, 2024      Becca Llamas  [address]   [place] [address]        Dear ,    We are writing to inform you of your test results.    Your urine test was normal. This is a screening test used to detect microscopic protein in the urine - which would be an early sign of damage to the kidneys brought on by hypertension and/or diabetes. We will follow this on an annual basis.       Resulted Orders   Albumin Random Urine Quantitative with Creat Ratio   Result Value Ref Range    Creatinine Urine mg/dL 48.2 mg/dL      Comment:      The reference ranges have not been established in urine creatinine. The results should be integrated into the clinical context for interpretation.    Albumin Urine mg/L <12.0 mg/L      Comment:      The reference ranges have not been established in urine albumin. The results should be integrated into the clinical context for interpretation.    Albumin Urine mg/g Cr        Comment:      Unable to calculate, urine albumin and/or urine creatinine is outside detectable limits.  Microalbuminuria is defined as an albumin:creatinine ratio of 17 to 299 for males and 25 to 299 for females. A ratio of albumin:creatinine of 300 or higher is indicative of overt proteinuria.  Due to biologic variability, positive results should be confirmed by a second, first-morning random or 24-hour timed urine specimen. If there is discrepancy, a third specimen is recommended. When 2 out of 3 results are in the microalbuminuria range, this is evidence for incipient nephropathy and warrants increased efforts at glucose control, blood pressure control, and institution of therapy with an angiotensin-converting-enzyme (ACE) inhibitor (if the patient can tolerate it).         If you have any questions or concerns, please call the clinic at the number listed above.     Sincerely,    Aleida Carrera PA-C/miguel

## 2024-02-26 NOTE — PROGRESS NOTES
Preventive Care Visit  [facility] VELIA Carrera PA-C, Family Medicine  Feb 26, 2024    Assessment & Plan     Routine general medical examination at a health care facility      HEALTH CARE MAINTENANCE              Reviewed USPTF recommendations and anticipatory guidance.              See orders.   Please call [phone] to schedule the mammogram    I recommend the RSV and Shingles shot at the pharmacy.   Recommend flu and covid shot at pharmacy as well.     Monitor home blood pressures and if over 140/90 mmHg, reach out (we may want to restart a diuretic, such as hydrochlorothiazide).     Screen for colon cancer  I discussed the importance of colorectal cancer screening, including the risks and benefits of the various procedures, including colonoscopy and cologuard.  Patient has opted to do the colonoscopy    - Colonoscopy Screening  Referral; Future    Cervical cancer screening  Has h/o abnormal pap, if this pap is normal, likely will discontinue screening given age.   - Pap Screen with HPV - recommended age 30 - 65 years    Morbid obesity (H)  Morbid obesity with co-morbidity:  HTN, lipids.   She is working on a healthy diet and staying active.  Would like to start biking.     S/P left knee surgery  Knee brace ordered for extra support to allow her to bike.   - Ankle/Knee Bracing Supplies Order Hinged Knee Brace; Left    Hypertension goal BP (blood pressure) < 140/90    Monitor home blood pressures and if over 140/90 mmHg, reach out (we may want to restart a diuretic, such as hydrochlorothiazide).   - Albumin Random Urine Quantitative with Creat Ratio    Encounter for screening mammogram for breast cancer  I discussed in detail with Caty Llamas the importance of breast cancer screening through monthly self breast exams and annual breast exams in the clinic.    - *MA Screening Digital Bilateral; Future    Ulnar neuropathy of left upper extremity  Likely from previous elbow  "injury.  Use compression glove (as this did help), time and may use NSAID PRN.     Patient has been advised of split billing requirements and indicates understanding: Yes        BMI  Estimated body mass index is 37.54 kg/m  as calculated from the following:    Height as of this encounter: 1.57 m (5' 1.81\").    Weight as of this encounter: 92.5 kg (204 lb).   Weight management plan: Discussed healthy diet and exercise guidelines    Counseling  Appropriate preventive services were discussed with this patient, including applicable screening as appropriate for fall prevention, nutrition, physical activity, Tobacco-use cessation, weight loss and cognition.  Checklist reviewing preventive services available has been given to the patient.  Reviewed patient's diet, addressing concerns and/or questions.           Loyd Hudson is a 64 year old, presenting for the following:  Physical         Health Care Directive  Patient does not have a Health Care Directive or Living Will: did not discuss    HPI    Has 2 concerns:    Feels numbness and tingling in left hand for a few months.  Tried compression glove 3 days ago and this helped.  This has helped her right hand in the past.    Tingling in 3rd, 4th and 5th digit.   Did hit her elbow a few months ago and hit it pretty hard.  Approximately a couple weeks later she felt the tingling.    She does a lot of typing.      2.  Would like to start up biking, however she is concerned about her left knee arthritis.  She does not want a knee replacement.  She would like a knee brace with support to help her joint. S/p reconstructive knee surgery after scope in 1994 junaid.  Tendon repair and 2 screws in place.               Patient with history of hypertension arrived for Annual Medicare Physical, due for PAP follow-up, undressing for exam.     Patient is not fasting for lab work.    Hypertension Follow-up    Do you check your blood pressure regularly outside of the clinic? No   Are you " following a low salt diet? Yes, no added salt   Are your blood pressures ever more than 140 on the top number (systolic) OR more   than 90 on the bottom number (diastolic), for example 140/90? No        2/26/2024   General Health   How would you rate your overall physical health? Excellent   Feel stress (tense, anxious, or unable to sleep) Not at all         2/26/2024   Nutrition   Diet: Regular (no restrictions)         2/26/2024   Exercise   Days per week of moderate/strenous exercise 4 days         2/26/2024   Social Factors   Frequency of gathering with friends or relatives Twice a week   Worry food won't last until get money to buy more No   Food not last or not have enough money for food? No   Do you have housing?  Yes   Are you worried about losing your housing? No   Lack of transportation? No   Unable to get utilities (heat,electricity)? No         2/26/2024   Fall Risk   Fallen 2 or more times in the past year? No   Trouble with walking or balance? No          2/26/2024   Activities of Daily Living- Home Safety   Needs help with the following daily activites None of the above   Safety concerns in the home None of the above         2/26/2024   Dental   Dentist two times every year? (!) DECLINE         2/26/2024   Hearing Screening   Hearing concerns? None of the above         2/26/2024   Driving Risk Screening   Patient/family members have concerns about driving No         2/26/2024   General Alertness/Fatigue Screening   Have you been more tired than usual lately? No         2/26/2024   Urinary Incontinence Screening   Bothered by leaking urine in past 6 months No            Today's PHQ-2 Score:       2/26/2024     7:16 AM   PHQ-2 ( 1999 [employer])   Q1: Little interest or pleasure in doing things 0   Q2: Feeling down, depressed or hopeless 0   PHQ-2 Score 0   Q1: Little interest or pleasure in doing things Not at all   Q2: Feeling down, depressed or hopeless Not at all   PHQ-2 Score 0           2/26/2024    Substance Use   Alcohol more than 3/day or more than 7/wk No   Do you have a current opioid prescription? No   How severe/bad is pain from 1 to 10? 0/10 (No Pain)   Do you use any other substances recreationally? No     Social History     Tobacco Use    Smoking status: Never    Smokeless tobacco: Never    Tobacco comments:     smoke every blue moon   Vaping Use    Vaping Use: Never used   Substance Use Topics    Alcohol use: Yes     Comment: very occ    Drug use: No            No data to display                 Mammogram Screening - Mammogram every 1-2 years updated in Health Maintenance based on mutual decision making    ASCVD Risk   The 10-year ASCVD risk score (Anjana CALVILLO, et al., 2019) is: 11.3%    Values used to calculate the score:      Age: 64 years      Sex: Female      Is Non- : No      Diabetic: No      Tobacco smoker: No      Systolic Blood Pressure: 146 mmHg      Is BP treated: Yes      HDL Cholesterol: 49 mg/dL      Total Cholesterol: 208 mg/dL            Reviewed and updated as needed this visit by Provider                    Past Medical History:   Diagnosis Date    Abnormal Pap smear of cervix 2011    see problem list    CARDIOVASCULAR SCREENING; LDL GOAL LESS THAN 160 5/9/2010    Cervical high risk HPV (human papillomavirus) test positive 03/27/2017, 2019    see problem list    Hx of colposcopy with cervical biopsy 2011    wnl    Hypertension goal BP (blood pressure) < 140/90 12/5/2011    Status post colposcopy 05/02/2017    ECC - negative for dysplasia     Past Surgical History:   Procedure Laterality Date    ARTHROSCOPY KNEE RT/LT  1988, 1994    left knee    s/p knee reconstructive surgery Left      Current providers sharing in care for this patient include:  Patient Care Team:  Aleida Carrera PA-C as PCP - General (Family Medicine)  Aleida Carrera PA-C as Assigned PCP  Araceli Hays PA-C as Assigned Heart and Vascular Provider    The following  "health maintenance items are reviewed in Epic and correct as of today:  Health Maintenance   Topic Date Due    ZOSTER IMMUNIZATION (1 of 2) Never done    RSV VACCINE (Pregnancy & 60+) (1 - 1-dose 60+ series) Never done    COLORECTAL CANCER SCREENING  03/26/2020    ASTHMA ACTION PLAN  04/19/2022    MICROALBUMIN  06/06/2023    ANNUAL REVIEW OF HM ORDERS  06/06/2023    MAMMO SCREENING  06/13/2023    INFLUENZA VACCINE (1) 09/01/2023    COVID-19 Vaccine (4 - 2023-24 season) 09/01/2023    PAP FOLLOW-UP  05/24/2024    HPV FOLLOW-UP  05/24/2024    ASTHMA CONTROL TEST  08/26/2024    BMP  01/12/2025    MEDICARE ANNUAL WELLNESS VISIT  02/26/2025    FALL RISK ASSESSMENT  02/26/2025    DEXA  03/11/2025    LIPID  05/10/2026    ADVANCE CARE PLANNING  05/17/2026    GLUCOSE  01/12/2027    DTAP/TDAP/TD IMMUNIZATION (3 - Td or Tdap) 05/13/2029    HEPATITIS C SCREENING  Completed    PHQ-2 (once per calendar year)  Completed    Pneumococcal Vaccine: 65+ Years  Completed    IPV IMMUNIZATION  Aged Out    HPV IMMUNIZATION  Aged Out    MENINGITIS IMMUNIZATION  Aged Out    RSV MONOCLONAL ANTIBODY  Aged Out    PAP  Discontinued         Review of Systems  Constitutional, neuro, ENT, endocrine, pulmonary, cardiac, gastrointestinal, genitourinary, musculoskeletal, integument and psychiatric systems are negative, except as otherwise noted.     Objective    Exam  BP (!) 146/82 (BP Location: Left arm, Patient Position: Chair, Cuff Size: Adult Regular)   Pulse 65   Temp 97.1  F (36.2  C) (Tympanic)   Resp 16   Ht 1.57 m (5' 1.81\")   Wt 92.5 kg (204 lb)   LMP  (LMP Unknown)   SpO2 99%   BMI 37.54 kg/m     Estimated body mass index is 37.54 kg/m  as calculated from the following:    Height as of this encounter: 1.57 m (5' 1.81\").    Weight as of this encounter: 92.5 kg (204 lb).    Physical Exam  GENERAL: alert and no distress  EYES: Eyes grossly normal to inspection, PERRL and conjunctivae and sclerae normal  HENT: ear canals and TM's normal, " nose and mouth without ulcers or lesions  NECK: no adenopathy, no asymmetry, masses, or scars  RESP: lungs clear to auscultation - no rales, rhonchi or wheezes  BREAST: normal without masses, tenderness or nipple discharge and no palpable axillary masses or adenopathy  CV: regular rate and rhythm, normal S1 S2, no S3 or S4, no murmur, click or rub, no peripheral edema  ABDOMEN: soft, nontender, no hepatosplenomegaly, no masses and bowel sounds normal   (female): normal female external genitalia, normal urethral meatus, normal vaginal mucosa  MS: no gross musculoskeletal defects noted, no edema  Knee full ROM, previous surgical scar noted.   SKIN: no suspicious lesions or rashes  NEURO: Normal strength and tone, mentation intact and speech normal  PSYCH: mentation appears normal, affect normal/bright  Negative Tinnels and Phalens sign bilaterally.  Normal sensation both hands.   Normal radial pulses and capillary refill.  Full ROM of elbow and wrists.            2/26/2024   Mini Cog   Clock Draw Score 2 Normal   3 Item Recall 3 objects recalled   Mini Cog Total Score 5           Signed Electronically by: Aleida Carrera PA-C    DME (Durable Medical Equipment) Orders and Documentation  Orders Placed This Encounter   Procedures    Ankle/Knee Bracing Supplies Order Hinged Knee Brace; Left        The patient was assessed and it was determined the patient is in need of the following listed DME Supplies/Equipment. Please complete supporting documentation below to demonstrate medical necessity.

## 2024-02-26 NOTE — LETTER
My Asthma Action Plan    Name: Caty Llamas   YOB: 1960  Date: 2/26/2024   My doctor: Aleida Carrera PA-C   [facility] clinic[facility]        My Rescue Medicine:   Albuterol inhaler (Proair/Ventolin/Proventil HFA)  2-4 puffs EVERY 4 HOURS as needed. Use a spacer if recommended by your provider.   My Asthma Severity:   Intermittent / Exercise Induced  Know your asthma triggers:  see below             GREEN ZONE   Good Control  I feel good  No cough or wheeze  Can work, sleep and play without asthma symptoms       Take your asthma control medicine every day.     If exercise triggers your asthma, take your rescue medication  15 minutes before exercise or sports, and  During exercise if you have asthma symptoms  Spacer to use with inhaler: If you have a spacer, make sure to use it with your inhaler             YELLOW ZONE Getting Worse  I have ANY of these:  I do not feel good  Cough or wheeze  Chest feels tight  Wake up at night   Keep taking your Green Zone medications  Start taking your rescue medicine:  every 20 minutes for up to 1 hour. Then every 4 hours for 24-48 hours.  If you stay in the Yellow Zone for more than 12-24 hours, contact your doctor.  If you do not return to the Green Zone in 12-24 hours or you get worse, start taking your oral steroid medicine if prescribed by your provider.           RED ZONE Medical Alert - Get Help  I have ANY of these:  I feel awful  Medicine is not helping  Breathing getting harder  Trouble walking or talking  Nose opens wide to breathe       Take your rescue medicine NOW  If your provider has prescribed an oral steroid medicine, start taking it NOW  Call your doctor NOW  If you are still in the Red Zone after 20 minutes and you have not reached your doctor:  Take your rescue medicine again and  Call 911 or go to the emergency room right away    See your regular doctor within 2 weeks of an Emergency Room or Urgent Care visit for  follow-up treatment.          Annual Reminders:  Meet with Asthma Educator,  Flu Shot in the Fall, consider Pneumonia Vaccination for patients with asthma (aged 19 and older).    Pharmacy:    [id number]  [facility] [address] AT [address] OF [facility]  [facility] [place] [place] - [address]    Electronically signed by Aleida Carrera PA-C   Date: 02/26/24                    Asthma Triggers  How To Control Things That Make Your Asthma Worse    Triggers are things that make your asthma worse.  Look at the list below to help you find your triggers and   what you can do about them. You can help prevent asthma flare-ups by staying away from your triggers.      Trigger                                                          What you can do   Cigarette Smoke  Tobacco smoke can make asthma worse. Do not allow smoking in your home, car or around you.  Be sure no one smokes at a child s day care or school.  If you smoke, ask your health care provider for ways to help you quit.  Ask family members to quit too.  Ask your health care provider for a referral to Quit Plan to help you quit smoking, or call 0-084-990-PLAN.     Colds, Flu, Bronchitis  These are common triggers of asthma. Wash your hands often.  Don t touch your eyes, nose or mouth.  Get a flu shot every year.     Dust Mites  These are tiny bugs that live in cloth or carpet. They are too small to see. Wash sheets and blankets in hot water every week.   Encase pillows and mattress in dust mite proof covers.  Avoid having carpet if you can. If you have carpet, vacuum weekly.   Use a dust mask and HEPA vacuum.   Pollen and Outdoor Mold  Some people are allergic to trees, grass, or weed pollen, or molds. Try to keep your windows closed.  Limit time out doors when pollen count is high.   Ask you health care provider about taking medicine during allergy season.     Animal Dander  Some people are allergic to  skin flakes, urine or saliva from pets with fur or feathers. Keep pets with fur or feathers out of your home.    If you can t keep the pet outdoors, then keep the pet out of your bedroom.  Keep the bedroom door closed.  Keep pets off cloth furniture and away from stuffed toys.     Mice, Rats, and Cockroaches  Some people are allergic to the waste from these pests.   Cover food and garbage.  Clean up spills and food crumbs.  Store grease in the refrigerator.   Keep food out of the bedroom.   Indoor Mold  This can be a trigger if your home has high moisture. Fix leaking faucets, pipes, or other sources of water.   Clean moldy surfaces.  Dehumidify basement if it is damp and smelly.   Smoke, Strong Odors, and Sprays  These can reduce air quality. Stay away from strong odors and sprays, such as perfume, powder, hair spray, paints, smoke incense, paint, cleaning products, candles and new carpet.   Exercise or Sports  Some people with asthma have this trigger. Be active!  Ask your doctor about taking medicine before sports or exercise to prevent symptoms.    Warm up for 5-10 minutes before and after sports or exercise.     Other Triggers of Asthma  Cold air:  Cover your nose and mouth with a scarf.  Sometimes laughing or crying can be a trigger.  Some medicines and food can trigger asthma.

## 2024-02-29 LAB
BKR LAB AP GYN ADEQUACY: NORMAL
BKR LAB AP GYN INTERPRETATION: NORMAL
BKR LAB AP HPV REFLEX: NORMAL
BKR LAB AP PREVIOUS ABNL DX: NORMAL
BKR LAB AP PREVIOUS ABNORMAL: NORMAL
PATH REPORT.COMMENTS IMP SPEC: NORMAL
PATH REPORT.COMMENTS IMP SPEC: NORMAL
PATH REPORT.RELEVANT HX SPEC: NORMAL

## 2024-03-03 LAB
HUMAN PAPILLOMA VIRUS 16 DNA: NEGATIVE
HUMAN PAPILLOMA VIRUS 18 DNA: NEGATIVE
HUMAN PAPILLOMA VIRUS FINAL DIAGNOSIS: ABNORMAL
HUMAN PAPILLOMA VIRUS OTHER HR: POSITIVE

## 2024-03-04 ENCOUNTER — PATIENT OUTREACH (OUTPATIENT)
Dept: FAMILY MEDICINE | Facility: CLINIC | Age: 64
End: 2024-03-04
Payer: COMMERCIAL

## 2024-03-04 NOTE — LETTER
March 4, 2024      Catyanselmo Robertsz  [address]  [place] [address]        Dear ,    This letter is regarding your recent Pap smear and Human Papillomavirus (HPV) test.    Your results showed a normal Pap smear and HPV positive.     About 80 percent of women have been exposed to HPV throughout their lifetime. There is no medication for the treatment of HPV. Typically, your own immune system gets rid of the virus before it does harm. HPV is spread by direct skin-to-skin contact, including sexual intercourse, oral sex, anal sex, or any other contact involving the genital area (example: hand to genital contact). It is not possible to become infected with HPV by touching an object, such as a toilet seat. Most people who are infected with HPV have no signs or symptoms.    Things that you can do to boost your immune system and help your body get rid of HPV: get plenty of rest, eat a well-balanced diet of healthy foods, exercise regularly, decrease stress and if you are a smoker, stop smoking.    It is recommended that you have your next Pap smear and Human Papillomavirus (HPV) test in 1 year.    If you have additional questions regarding this result, please contact our Registered Nurse, Ainsley, at [phone].      Sincerely,    Your [facility] Care Team

## 2024-03-05 NOTE — PROGRESS NOTES
"    SUBJECTIVE:                                                    Caty Llamas is a 57 year old female who presents to clinic today for the following health issues:    Musculoskeletal problem/pain      Duration: 2 weeks     Description  Location: left shoulder    Intensity:  severe    Accompanying signs and symptoms: radiation of pain to her left wrist    History  Previous similar problem: no   Previous evaluation:  none    Precipitating or alleviating factors:  Trauma or overuse: no   Aggravating factors include: lifting and moving arm in general    Therapies tried and outcome: Aspire cream and Aleve     Injured left shoulder 1994 when she fell. 2 weeks ago rolled over in bed and felt a \"pop\" left shoulder. Pain gradually improved but did not resolve completely. Again last night rolled over in bed and again felt a \"pop\" left shoulder. No N/T.     Wearing a sling she had from a right shoulder program.    Allergies   Allergen Reactions     Salicylates      ferenol       Past Medical History:   Diagnosis Date     CARDIOVASCULAR SCREENING; LDL GOAL LESS THAN 160 5/9/2010     Cervical high risk HPV (human papillomavirus) test positive 03/27/2017    Neg 16/18     Hx of colposcopy with cervical biopsy 2011    wnl     Hypertension goal BP (blood pressure) < 140/90 12/5/2011     LSIL (low grade squamous intraepithelial lesion) on Pap smear 2011    Fulda neg     Status post colposcopy 05/02/2017    ECC - negative for dysplasia         Current Outpatient Prescriptions on File Prior to Visit:  metoprolol (TOPROL-XL) 100 MG 24 hr tablet Take 1 tablet (100 mg) by mouth daily   amLODIPine (NORVASC) 10 MG tablet Take 1 tablet (10 mg) by mouth daily   order for DME Equipment being ordered: knee brace,   ORDER FOR DME Equipment being ordered: home blood pressure monitoring device     No current facility-administered medications on file prior to visit.     Social History   Substance Use Topics     Smoking status: Never Smoker     " Onset: started yesterday      Location / description: patient reports has post nasal drip, sinus pain/congestion  Coughing up clear mucus.   Daughter had similar symptoms last week  Ears keep popping   Precipitating Factors: deviated septum and polyp on one side is prone to sinus infection  Pain Scale (1-10), 10 highest: 8/10  What improves/worsens symptoms: no medications helping with symptoms. Lying down makes worse   Symptom specific medications: dayquil sinus, singulair, zyrtec, flonase   LMP : No LMP recorded. Patient has had a hysterectomy.   Are you pregnant or breast feeding: not addressed due to age.   Recent visits (last 3-4 weeks) for same reason or recent surgery:  no recent evaluation    PLAN:    Provider's office  See PCP now - if no appointment - go to Urgent Care    Patient/Caller agrees to follow recommendations.  Reason for Disposition   SEVERE sinus pain    Protocols used: Sinus Pain or Congestion-A-OH     Smokeless tobacco: Never Used      Comment: smoke every blue moon     Alcohol use Yes      Comment: very seldom       ROS:  General: No fevers  Musculoskel: + as above  Skin: as above    OBJECTIVE:  There were no vitals taken for this visit.   General:   awake, alert, and cooperative.  NAD.   Head: Normocephalic, atraumatic.  Eyes: Conjunctiva clear,   MS: Left shoulder mainly tender mid clavicle and musculature. Some mild AC joint tenderness. Negative impingement sign. Strength L grossly normal compared to right. FROM but pain mainly with reaching her arm across the front of her chest to the opposite side. Full ER/IR. Good radial pulse. FROM of neck. Mild trapezius and SCM tenderness.   Neuro: Alert and oriented - normal speech.    xrays-I see no acute abnormalities    ASSESSMENT:well appearing    ICD-10-CM    1. Shoulder injury, left, initial encounter S49.92XA XR Shoulder Left G/E 3 Views     XR Clavicle Left     ORTHOPEDICS ADULT REFERRAL     cyclobenzaprine (FLEXERIL) 10 MG tablet       PLAN: Unclear etiology. See Ortho. Ice. Try Flexeril at bedtime  Advised about symptoms which might herald more serious problems.    Analia Brooke PA-C

## 2024-07-19 ENCOUNTER — TELEPHONE (OUTPATIENT)
Dept: FAMILY MEDICINE | Facility: CLINIC | Age: 64
End: 2024-07-19
Payer: COMMERCIAL

## 2024-07-19 NOTE — TELEPHONE ENCOUNTER
Patient presented to the clinic and requested to speak to a nurse.     RN brought patient back to the offices. She stated she has been crying frequently and has been blowing her nose. Today while she was at work she blew her nose and a clot had come out. She presented the clot to the RN visit. It appeared to be the size of a quarter. She has not had a bloody nose in years so this was quite scary for her. She was not bleeding at time of the visit. She is not on blood thinners. She wanted to be sure that the clot was a clot which is why she presented to the clinic. Declined this RN to take blood pressure.     RN educated on when to seek more urgent care with bloody noses, educated to not pull at clots.    Educated on triage nurse line and talk therapy if needed for her mourning. She verbalized good understanding.     Kathrine Daugherty RN on 7/19/2024 at 2:15 PM

## 2024-09-16 ENCOUNTER — NURSE TRIAGE (OUTPATIENT)
Dept: FAMILY MEDICINE | Facility: CLINIC | Age: 64
End: 2024-09-16
Payer: COMMERCIAL

## 2024-09-16 ENCOUNTER — OFFICE VISIT (OUTPATIENT)
Dept: URGENT CARE | Facility: URGENT CARE | Age: 64
End: 2024-09-16
Payer: COMMERCIAL

## 2024-09-16 ENCOUNTER — TELEPHONE (OUTPATIENT)
Dept: FAMILY MEDICINE | Facility: CLINIC | Age: 64
End: 2024-09-16

## 2024-09-16 VITALS
TEMPERATURE: 98.5 F | OXYGEN SATURATION: 98 % | HEART RATE: 80 BPM | WEIGHT: 202.4 LBS | RESPIRATION RATE: 16 BRPM | BODY MASS INDEX: 37.25 KG/M2 | SYSTOLIC BLOOD PRESSURE: 135 MMHG | DIASTOLIC BLOOD PRESSURE: 83 MMHG

## 2024-09-16 DIAGNOSIS — U07.1 INFECTION DUE TO 2019 NOVEL CORONAVIRUS: ICD-10-CM

## 2024-09-16 DIAGNOSIS — J98.8 RESPIRATORY INFECTION: Primary | ICD-10-CM

## 2024-09-16 PROCEDURE — 99213 OFFICE O/P EST LOW 20 MIN: CPT | Performed by: FAMILY MEDICINE

## 2024-09-16 RX ORDER — BENZONATATE 200 MG/1
200 CAPSULE ORAL 3 TIMES DAILY PRN
Qty: 20 CAPSULE | Refills: 1 | Status: SHIPPED | OUTPATIENT
Start: 2024-09-16

## 2024-09-16 RX ORDER — AZITHROMYCIN 250 MG/1
TABLET, FILM COATED ORAL
Qty: 6 TABLET | Refills: 0 | Status: SHIPPED | OUTPATIENT
Start: 2024-09-16 | End: 2024-09-21

## 2024-09-16 RX ORDER — PREDNISONE 20 MG/1
TABLET ORAL
Qty: 9 TABLET | Refills: 0 | Status: SHIPPED | OUTPATIENT
Start: 2024-09-16

## 2024-09-16 NOTE — PROGRESS NOTES
(J98.8) Respiratory infection  (primary encounter diagnosis)  Comment:   Plan: azithromycin (ZITHROMAX) 250 MG tablet,         predniSONE (DELTASONE) 20 MG tablet,         benzonatate (TESSALON) 200 MG capsule            (U07.1) Infection due to 2019 novel coronavirus  Comment:   Plan:       Discussion:  Symptoms consistent with COVID and onset about 1 week ago.  She now has more of a bronchitis picture.  Not wheezing or short of breath.  Has more cold associated asthma history.      CHIEF COMPLAINT    Cough.      HISTORY    This patient had some congestion and fever onset 1 week ago.  She had some malaise.  What remains now has a cough productive of some clear sputum production.  Hard for her to sleep at night.    She did do a home COVID test 3 days ago which was positive.    Patient had COVID at the very onset of the pandemic with typical symptoms.  She has been vaccinated at all opportunities.  Has not had COVID since.      REVIEW OF SYSTEMS    No fever.  No sore throat or ear pain.  No wheezing or SOB.  No chest pain.  No edema.  No vomiting or diarrhea.  No rash.            EXAM  /83 (BP Location: Left arm, Cuff Size: Adult Regular)   Pulse 80   Temp 98.5  F (36.9  C) (Tympanic)   Resp 16   Wt 91.8 kg (202 lb 6.4 oz)   LMP  (LMP Unknown)   SpO2 98%   BMI 37.25 kg/m        Pharynx without inflammation.  No cervical adenopathy.  Lungs clear.  Cardiac RSR without murmur.  Abdomen nontender.

## 2024-09-16 NOTE — TELEPHONE ENCOUNTER
Pt calling with cough that interferes with life mostly sleep.     Pt tested postive for COVID and symptoms started 9/10. Pt states she is feeling much better but her cough is keeping her up at night and some nights she coughs so much she throws up.    Pt is looking for an in person appointment today. Declines virtual and telephone. RN relayed there are no appointments at her home clinic.     Pt states she will be seen in UC setting.     Vicki Garsia RN  Reason for Disposition   Dry cough (non-productive; no sputum or minimal clear sputum) and within 14 days of COVID-19 Exposure   Continuous (nonstop) coughing interferes with work or school and no improvement using cough treatment per Care Advice    Additional Information   Negative: Bluish (or gray) lips or face   Negative: SEVERE difficulty breathing (e.g., struggling for each breath, speaks in single words)   Negative: Rapid onset of cough and has hives   Negative: Coughing started suddenly after medicine, an allergic food or bee sting   Negative: Difficulty breathing after exposure to flames, smoke, or fumes   Negative: Sounds like a life-threatening emergency to the triager   Negative: Diagnosed or suspected COVID-19 and symptoms lasting 3 or more weeks   Negative: COVID-19 exposure and no symptoms   Negative: COVID-19 vaccine reaction suspected (e.g., fever, headache, muscle aches) occurring 1 to 3 days after getting vaccine   Negative: COVID-19 vaccine, questions about   Negative: Lives with someone known to have influenza (flu test positive) and flu-like symptoms (e.g., cough, runny nose, sore throat, SOB; with or without fever)   Negative: Possible COVID-19 symptoms and triager concerned about severity of symptoms or other causes   Negative: COVID-19 and breastfeeding, questions about   Negative: SEVERE difficulty breathing (e.g., struggling for each breath, speaks in single words)   Negative: Difficult to awaken or acting confused (e.g., disoriented,  slurred speech)   Negative: Bluish (or gray) lips or face now   Negative: Shock suspected (e.g., cold/pale/clammy skin, too weak to stand, low BP, rapid pulse)   Negative: Sounds like a life-threatening emergency to the triager   Negative: SEVERE or constant chest pain or pressure  (Exception: Mild central chest pain, present only when coughing.)   Negative: MODERATE difficulty breathing (e.g., speaks in phrases, SOB even at rest, pulse 100-120)   Negative: Headache and stiff neck (can't touch chin to chest)   Negative: Oxygen level (e.g., pulse oximetry) 90% or lower   Negative: Chest pain or pressure  (Exception: MILD central chest pain, present only when coughing.)   Negative: Drinking very little and dehydration suspected (e.g., no urine > 12 hours, very dry mouth, very lightheaded)   Negative: Patient sounds very sick or weak to the triager   Negative: MILD difficulty breathing (e.g., minimal/no SOB at rest, SOB with walking, pulse <100)   Negative: Fever > 103 F (39.4 C)   Negative: Fever > 101 F (38.3 C) and over 60 years of age   Negative: Fever > 100.0 F (37.8 C) and bedridden (e.g., CVA, chronic illness, recovering from surgery)   Negative: HIGH RISK patient (e.g., weak immune system, age > 64 years, obesity with BMI of 30 or higher, pregnant, chronic lung disease or other chronic medical condition) and COVID symptoms (e.g., cough, fever)  (Exceptions: Already seen by doctor or NP/PA and no new or worsening symptoms.)   Negative: HIGH RISK patient and influenza is widespread in the community and ONE OR MORE respiratory symptoms: cough, sore throat, runny or stuffy nose   Negative: HIGH RISK patient and influenza exposure within the last 7 days and ONE OR MORE respiratory symptoms: cough, sore throat, runny or stuffy nose   Negative: Oxygen level (e.g., pulse oximetry) 91 to 94%    Protocols used: Cough-A-OH, Coronavirus (COVID-19) Diagnosed or [id number]-OA

## 2024-09-16 NOTE — TELEPHONE ENCOUNTER
Patient dropped of a copy of this blood pressure readings she does not have a current appt with Aleida Carrera it is just for her information

## 2024-09-16 NOTE — PATIENT INSTRUCTIONS
Take prescribed medication as directed.    You are beyond the isolation time we used to recommend.

## 2024-09-17 DIAGNOSIS — I10 HYPERTENSION GOAL BP (BLOOD PRESSURE) < 140/90: ICD-10-CM

## 2024-09-17 RX ORDER — AMLODIPINE BESYLATE 10 MG/1
10 TABLET ORAL DAILY
Qty: 90 TABLET | Refills: 1 | Status: SHIPPED | OUTPATIENT
Start: 2024-09-17

## 2024-09-17 RX ORDER — METOPROLOL SUCCINATE 50 MG/1
50 TABLET, EXTENDED RELEASE ORAL DAILY
Qty: 90 TABLET | Refills: 1 | Status: SHIPPED | OUTPATIENT
Start: 2024-09-17

## 2024-10-30 ENCOUNTER — OFFICE VISIT (OUTPATIENT)
Dept: URGENT CARE | Facility: URGENT CARE | Age: 64
End: 2024-10-30
Payer: COMMERCIAL

## 2024-10-30 VITALS
DIASTOLIC BLOOD PRESSURE: 84 MMHG | SYSTOLIC BLOOD PRESSURE: 148 MMHG | BODY MASS INDEX: 37.91 KG/M2 | WEIGHT: 206 LBS | TEMPERATURE: 97.4 F | HEART RATE: 84 BPM | OXYGEN SATURATION: 97 % | RESPIRATION RATE: 20 BRPM

## 2024-10-30 DIAGNOSIS — J98.8 RESPIRATORY INFECTION: ICD-10-CM

## 2024-10-30 PROCEDURE — 99213 OFFICE O/P EST LOW 20 MIN: CPT | Performed by: STUDENT IN AN ORGANIZED HEALTH CARE EDUCATION/TRAINING PROGRAM

## 2024-10-30 RX ORDER — BENZONATATE 200 MG/1
200 CAPSULE ORAL 3 TIMES DAILY PRN
Qty: 20 CAPSULE | Refills: 1 | Status: SHIPPED | OUTPATIENT
Start: 2024-10-30 | End: 2024-10-30

## 2024-10-30 RX ORDER — BENZONATATE 200 MG/1
200 CAPSULE ORAL 3 TIMES DAILY PRN
Qty: 30 CAPSULE | Refills: 1 | Status: SHIPPED | OUTPATIENT
Start: 2024-10-30

## 2024-10-30 RX ORDER — ALBUTEROL SULFATE 90 UG/1
2 INHALANT RESPIRATORY (INHALATION) EVERY 6 HOURS PRN
Qty: 18 G | Refills: 1 | Status: SHIPPED | OUTPATIENT
Start: 2024-10-30

## 2024-10-30 ASSESSMENT — PAIN SCALES - GENERAL: PAINLEVEL_OUTOF10: NO PAIN (0)

## 2024-10-30 NOTE — PROGRESS NOTES
Assessment & Plan     Respiratory infection  Becca was treated with azithromycin and prednisone 5 weeks ago and the purulent sputum has improved. She has asthma and she has been able to keep those symptoms under reasonable control with avoiding cold drinks and food which trigger her asthma symptoms. She declined prescription for prednisone as she does not feel she needs another course of prednisone. She would like to have an inhaler on hand and another prescription for tessalon perles for cough suppression and will follow up if symptoms persist or worsen.    - albuterol (PROAIR HFA/PROVENTIL HFA/VENTOLIN HFA) 108 (90 Base) MCG/ACT inhaler  Dispense: 18 g; Refill: 1  - benzonatate (TESSALON) 200 MG capsule  Dispense: 30 capsule; Refill: 1         No follow-ups on file.    Nay Lindo, [facility] [place]    Subjective     Becca is a 64 year old female who presents to clinic today for the following health issues:  Chief Complaint   Patient presents with    Nausea    Cough     X's 1 month, hard time talking triggers cough      Asthma     Asthma acting up with any cold   Was treated with azithromycin and prednisone 5 weeks ago and the purulent sputum has improved. Does not feel she needs another course of prednisone. She can calm her airways by drinking warm liquids. Cold liquids will cause her asthma symptoms to flare up.          10/30/2024    10:08 AM   Additional Questions   Roomed by ca   Accompanied by self     HPI      Review of Systems  Constitutional, HEENT, cardiovascular, pulmonary, GI, , musculoskeletal, neuro, skin, endocrine and psych systems are negative, except as otherwise noted.      Objective    BP (!) 148/84   Pulse 84   Temp 97.4  F (36.3  C) (Tympanic)   Resp 20   Wt 93.4 kg (206 lb)   LMP  (LMP Unknown)   SpO2 97%   BMI 37.91 kg/m    Physical Exam   GENERAL: alert and no distress  RESP: lungs clear to auscultation - no rales, rhonchi or wheezes  CV:  regular rate and rhythm, normal S1 S2, no S3 or S4, no murmur, click or rub, no peripheral edema  MS: no gross musculoskeletal defects noted, no edema  SKIN: no suspicious lesions or rashes  NEURO: Normal strength and tone, mentation intact and speech normal  PSYCH: mentation appears normal, affect normal/bright

## 2025-01-22 ENCOUNTER — OFFICE VISIT (OUTPATIENT)
Dept: URGENT CARE | Facility: URGENT CARE | Age: 65
End: 2025-01-22
Payer: COMMERCIAL

## 2025-01-22 VITALS
BODY MASS INDEX: 37.87 KG/M2 | WEIGHT: 205.8 LBS | HEART RATE: 81 BPM | OXYGEN SATURATION: 97 % | TEMPERATURE: 100.2 F | RESPIRATION RATE: 16 BRPM | SYSTOLIC BLOOD PRESSURE: 148 MMHG | DIASTOLIC BLOOD PRESSURE: 80 MMHG

## 2025-01-22 DIAGNOSIS — J10.1 INFLUENZA A: Primary | ICD-10-CM

## 2025-01-22 DIAGNOSIS — R50.9 FEVER IN ADULT: ICD-10-CM

## 2025-01-22 DIAGNOSIS — J02.9 SORE THROAT: ICD-10-CM

## 2025-01-22 LAB
DEPRECATED S PYO AG THROAT QL EIA: NEGATIVE
FLUAV AG SPEC QL IA: POSITIVE
FLUBV AG SPEC QL IA: NEGATIVE

## 2025-01-22 PROCEDURE — 87804 INFLUENZA ASSAY W/OPTIC: CPT | Performed by: PHYSICIAN ASSISTANT

## 2025-01-22 PROCEDURE — 87651 STREP A DNA AMP PROBE: CPT | Performed by: PHYSICIAN ASSISTANT

## 2025-01-22 PROCEDURE — 99214 OFFICE O/P EST MOD 30 MIN: CPT | Performed by: PHYSICIAN ASSISTANT

## 2025-01-22 RX ORDER — OSELTAMIVIR PHOSPHATE 75 MG/1
75 CAPSULE ORAL 2 TIMES DAILY
Qty: 10 CAPSULE | Refills: 0 | Status: SHIPPED | OUTPATIENT
Start: 2025-01-22 | End: 2025-01-27

## 2025-01-22 NOTE — LETTER
2025    Catyanselmo Llamas   1960        To Whom it May Concern;    Patient was seen and treated today at our clinic. Before returning to work, she must be fever free for 24 hours and have an improvement in symptoms. Please excuse her from work missed during this time. If she must be absent beyond 25, she'll need to be seen for further evaluation.     Sincerely,        Kavita Wilks PA-C

## 2025-01-23 LAB — S PYO DNA THROAT QL NAA+PROBE: NOT DETECTED

## 2025-01-23 NOTE — PATIENT INSTRUCTIONS
Start Tamiflu (antiviral medicine) twice a day for 5 days with food  Drink plenty of fluids and rest  Tylenol or ibuprofen as needed for aches and fever   Stay home and away from others until you have been fever free for 24 hours without medications.  Be seen in person at urgent care if your symptoms do not improve after 10 days.  Continue to receive the influenza vaccine yearly.

## 2025-01-23 NOTE — PROGRESS NOTES
Assessment & Plan     Influenza A  - oseltamivir (TAMIFLU) 75 MG capsule; Take 1 capsule (75 mg) by mouth 2 times daily for 5 days.    Sore throat  - Streptococcus A Rapid Screen w/Reflex to PCR - Clinic Collect  - Group A Streptococcus PCR Throat Swab    Fever in adult  - Influenza A & B Antigen - Clinic Collect    Positive for influenza A, start Tamiflu. Symptomatic measures discussed including fluids, rest, Tylenol, ibuprofen.    Return in about 1 week (around 1/29/2025) for visit with primary care provider if not improving.     [facility]    Subjective   Caty Llamas is a 64 year old who presents for the following health issues     Patient presents with:  Cough  Headache  Fever  Dental Pain  Cold Symptoms      HPI    Becca presents to clinic today for evaluation of a cough and headache  Symptoms began 2 days ago  Pain in teeth, felt feverish yesterday but didn't take temp  No nasal congestion, ear pain or sore throat  COVID negative at home  Using albuterol which helps    Review of Systems   ROS negative except as stated above.      Objective    BP (!) 148/80   Pulse 81   Temp 100.2  F (37.9  C) (Tympanic)   Resp 16   Wt 93.4 kg (205 lb 12.8 oz)   LMP  (LMP Unknown)   SpO2 97%   BMI 37.87 kg/m    Physical Exam   GENERAL: alert and no distress  EYES: Eyes grossly normal to inspection, PERRL and conjunctivae and sclerae normal  HENT: ear canals and TM's normal, nose and mouth without ulcers or lesions  NECK: no adenopathy, no asymmetry, masses, or scars  RESP: lungs clear to auscultation - no rales, rhonchi or wheezes  CV: regular rate and rhythm, normal S1 S2, no S3 or S4, no murmur, click or rub, no peripheral edema    Results for orders placed or performed in visit on 01/22/25   Streptococcus A Rapid Screen w/Reflex to PCR - Clinic Collect     Status: Normal    Specimen: Throat; Swab   Result Value Ref Range    Group A Strep antigen Negative Negative    Influenza A & B Antigen - Clinic Collect     Status: Abnormal    Specimen: Nose; Swab   Result Value Ref Range    Influenza A antigen Positive (A) Negative    Influenza B antigen Negative Negative    Narrative    Test results must be correlated with clinical data. If necessary, results should be confirmed by a molecular assay or viral culture.

## 2025-02-19 ENCOUNTER — TELEPHONE (OUTPATIENT)
Dept: FAMILY MEDICINE | Facility: CLINIC | Age: 65
End: 2025-02-19
Payer: COMMERCIAL

## 2025-02-19 NOTE — LETTER
February 19, 2025    To  Caty Llmaas  [address]  [place] [address]      Your team at [facility] cares about your health. We have reviewed your chart and based on our findings; we are making the following recommendations to better manage your health.     You are in particular need of attention regarding the following:     PREVENTATIVE VISIT: Annual Medicare Wellness:Schedule an Annual Medicare Wellness Exam. Please call your [facility] clinic to set up your appointment.    If you have already completed these items, please contact the clinic via phone or   MyChart so your care team can review and update your records. Thank you for   choosing [facility] for your healthcare needs. For any questions,   concerns, or to schedule an appointment please contact our clinic.    Healthy Regards,      Your [facility] Care Team

## 2025-02-19 NOTE — TELEPHONE ENCOUNTER
Patient Quality Outreach    Patient is due for the following:   Physical Annual Wellness Visit    Action(s) Taken:   Schedule a Annual Wellness Visit    Type of outreach:    Sent letter.    Questions for provider review:    None           Opal Pedraza MA  Chart routed to Care Team.

## 2025-03-22 ENCOUNTER — OFFICE VISIT (OUTPATIENT)
Dept: URGENT CARE | Facility: URGENT CARE | Age: 65
End: 2025-03-22
Payer: COMMERCIAL

## 2025-03-22 VITALS
DIASTOLIC BLOOD PRESSURE: 79 MMHG | RESPIRATION RATE: 16 BRPM | HEIGHT: 64 IN | BODY MASS INDEX: 34.83 KG/M2 | HEART RATE: 85 BPM | WEIGHT: 204 LBS | OXYGEN SATURATION: 98 % | TEMPERATURE: 98.5 F | SYSTOLIC BLOOD PRESSURE: 177 MMHG

## 2025-03-22 DIAGNOSIS — R22.42 MASS OF LEFT LOWER EXTREMITY: Primary | ICD-10-CM

## 2025-03-22 PROCEDURE — 3078F DIAST BP <80 MM HG: CPT | Performed by: FAMILY MEDICINE

## 2025-03-22 PROCEDURE — 99213 OFFICE O/P EST LOW 20 MIN: CPT | Performed by: FAMILY MEDICINE

## 2025-03-22 PROCEDURE — 3077F SYST BP >= 140 MM HG: CPT | Performed by: FAMILY MEDICINE

## 2025-03-22 NOTE — PROGRESS NOTES
"      (R22.42) Mass of left lower extremity  (primary encounter diagnosis)  Comment:     Onset today.  Consider fluid oozing from a Baker's cyst or possibly even a hematoma.  It could possibly be a lipoma that she had not noticed before.    This does not appear to need urgent workup so we will have her follow-up with primary.    Plan: Primary Care Referral        Indications to return sooner were also discussed.        CHIEF COMPLAINT    Check lump left leg.      HISTORY    This 65-year-old woman had the appearance of a lump in the left leg just above the left knee which was moderately uncomfortable.  The lump has gone down a bit in the last couple of hours.    There was no known injury.  She is not on blood thinners.  She has had a left TKA.      REVIEW OF SYSTEMS    No fever.  No breathing problems.  No chest pain.  No abdominal pain.        EXAM  BP (!) 177/79 (BP Location: Right arm, Patient Position: Sitting, Cuff Size: Adult Large)   Pulse 85   Temp 98.5  F (36.9  C) (Tympanic)   Resp 16   Ht 1.626 m (5' 4\")   Wt 92.5 kg (204 lb)   LMP  (LMP Unknown)   SpO2 98%   BMI 35.02 kg/m      Left lower extremity:  A firm, oval-shaped lump is palpable on the posterior lateral aspect of the left thigh just above the knee.  Size of this is about 5 x 9 cm.  It is firm.  Not especially tender.  No overlying redness or warmth.    "

## 2025-03-22 NOTE — LETTER
March 22, 2025        Caty Llamas        She was seen today.    She will need a follow up appointment.          Jair Daugherty MD on 3/22/2025 at 3:11 PM                Electronically signed

## 2025-03-22 NOTE — PATIENT INSTRUCTIONS
Light activity next 2 days.    OK to wrap the area.    Follow up with your Clinic.    Return Urgent Care or E R for worsening pain or swelling.

## 2025-03-31 ENCOUNTER — ANCILLARY PROCEDURE (OUTPATIENT)
Dept: ULTRASOUND IMAGING | Facility: CLINIC | Age: 65
End: 2025-03-31
Attending: PHYSICIAN ASSISTANT
Payer: COMMERCIAL

## 2025-03-31 ENCOUNTER — OFFICE VISIT (OUTPATIENT)
Dept: FAMILY MEDICINE | Facility: CLINIC | Age: 65
End: 2025-03-31
Payer: COMMERCIAL

## 2025-03-31 VITALS
BODY MASS INDEX: 34.83 KG/M2 | OXYGEN SATURATION: 99 % | HEART RATE: 69 BPM | HEIGHT: 64 IN | TEMPERATURE: 97.4 F | DIASTOLIC BLOOD PRESSURE: 82 MMHG | RESPIRATION RATE: 16 BRPM | SYSTOLIC BLOOD PRESSURE: 136 MMHG | WEIGHT: 204 LBS

## 2025-03-31 DIAGNOSIS — F43.21 GRIEVING: ICD-10-CM

## 2025-03-31 DIAGNOSIS — R22.42 MASS OF LEFT LOWER EXTREMITY: Primary | ICD-10-CM

## 2025-03-31 DIAGNOSIS — R22.42 MASS OF LEFT LOWER EXTREMITY: ICD-10-CM

## 2025-03-31 DIAGNOSIS — E66.01 MORBID OBESITY (H): ICD-10-CM

## 2025-03-31 PROCEDURE — 76882 US LMTD JT/FCL EVL NVASC XTR: CPT | Mod: TC | Performed by: RADIOLOGY

## 2025-03-31 PROCEDURE — 3075F SYST BP GE 130 - 139MM HG: CPT | Performed by: PHYSICIAN ASSISTANT

## 2025-03-31 PROCEDURE — 1125F AMNT PAIN NOTED PAIN PRSNT: CPT | Performed by: PHYSICIAN ASSISTANT

## 2025-03-31 PROCEDURE — 3079F DIAST BP 80-89 MM HG: CPT | Performed by: PHYSICIAN ASSISTANT

## 2025-03-31 PROCEDURE — 99213 OFFICE O/P EST LOW 20 MIN: CPT | Performed by: PHYSICIAN ASSISTANT

## 2025-03-31 ASSESSMENT — ASTHMA QUESTIONNAIRES
QUESTION_3 LAST FOUR WEEKS HOW OFTEN DID YOUR ASTHMA SYMPTOMS (WHEEZING, COUGHING, SHORTNESS OF BREATH, CHEST TIGHTNESS OR PAIN) WAKE YOU UP AT NIGHT OR EARLIER THAN USUAL IN THE MORNING: NOT AT ALL
ACT_TOTALSCORE: 22
QUESTION_5 LAST FOUR WEEKS HOW WOULD YOU RATE YOUR ASTHMA CONTROL: COMPLETELY CONTROLLED
QUESTION_4 LAST FOUR WEEKS HOW OFTEN HAVE YOU USED YOUR RESCUE INHALER OR NEBULIZER MEDICATION (SUCH AS ALBUTEROL): ONE OR TWO TIMES PER DAY
QUESTION_1 LAST FOUR WEEKS HOW MUCH OF THE TIME DID YOUR ASTHMA KEEP YOU FROM GETTING AS MUCH DONE AT WORK, SCHOOL OR AT HOME: NONE OF THE TIME
QUESTION_2 LAST FOUR WEEKS HOW OFTEN HAVE YOU HAD SHORTNESS OF BREATH: NOT AT ALL

## 2025-03-31 ASSESSMENT — PAIN SCALES - GENERAL: PAINLEVEL_OUTOF10: MODERATE PAIN (5)

## 2025-03-31 NOTE — PROGRESS NOTES
"  Assessment & Plan     Mass of left lower extremity  Reassured Becca the location of this lump is not where we would find a DVT.  Differentials include a lipoma vs a rupture cyst (from recent increase in exercise) vs hematoma (no injury noted) vs mass (which is very unlikely given the quick onset).   Will evaluate further with an US and may consider a CT scan if US is inconclusive.   - Primary Care Referral  - US Lower Extremity Non Vascular Left; Future    Grieving  Discussed mental health, she feels she is doing ok and going through the grieving process ok.  She declines referral for therapist.      Morbid obesity:    Morbid obesity with co-morbidity: HTN, lipids, osteoarthritis.   She was working on increasing activity, however now set back with this lump.  I recommend upper body exercises for now.     MED REC REQUIRED  Post Medication Reconciliation Status: discharge medications reconciled, continue medications without change  BMI  Estimated body mass index is 35.02 kg/m  as calculated from the following:    Height as of this encounter: 1.626 m (5' 4\").    Weight as of this encounter: 92.5 kg (204 lb).             Loyd Hudson is a 65 year old, presenting for the following health issues:  UC Follow-Up        3/31/2025     8:51 AM   Additional Questions   Roomed by Opal   Accompanied by Self         3/31/2025     8:51 AM   Patient Reported Additional Medications   Patient reports taking the following new medications NA     History of Present Illness       Reason for visit:  Lump on the back of my left leg   She is taking medications regularly.        ED/UC Followup:    Facility:  [facility] [place]  Date of visit: 3/22/25  Reason for visit: Mass of left lower extremity.   Current Status: Patient reports symptoms have remained mostly unchanged. Sitting is difficult per pt as any Pressure to back of Leg makes pain flare. Pain ranges from Moderate to Severe. No drainage or open wounds per " "pt, there is occasional Swelling in Leg. Pt has been resting as much as possible.     No drainage.    Area did feel warm and swollen about 9 days ago.  This lasted a couple hours.  She left work and went to urgent care.    Thought to be a cyst.      By the time she left , symptoms improved.     Sitting and driving still uncomfortable, anytime there is pressure on the area it hurts (so she has to sit forward on a chair).     She states there is no pain, but more a discomfort feeling (5/10). Pain will shoot up the back of leg.      No numbness or tingling.   No fevers.      She is grieving the loss of both mother's (her biological and adopted) as well as her sister.  She was trying to feel better and get back into exercising so started that up recently and then developed this cyst.   No injury.   She declines counseling, she has a strong social support system.  In a new relationship, this is going well.           Review of Systems  Constitutional, HEENT, cardiovascular, pulmonary, gi and gu systems are negative, except as otherwise noted.      Objective    /82 (BP Location: Right arm, Patient Position: Chair, Cuff Size: Adult Large)   Pulse 69   Temp 97.4  F (36.3  C) (Tympanic)   Resp 16   Ht 1.626 m (5' 4\")   Wt 92.5 kg (204 lb)   LMP  (LMP Unknown)   SpO2 99%   BMI 35.02 kg/m    Body mass index is 35.02 kg/m .  Physical Exam   GENERAL: alert and no distress  MS: extremities normal- no gross deformities noted    GAIT: NORMAL    MUSCULOSKELETAL:  LEFT KNEE   Inspection: AP/lateral alignment normal, No effusion, s/p knee surgery (many years ago)  Tender: none  Active Range of Motion: full flexion, full extension  Strength: 5/5  No warmth noted over bilateral knees.     Left lower out thigh with a 2.5 cm x 2.5 cm firm area noted.  Area does transilluminate.                     Signed Electronically by: Aleida Carrera PA-C    "

## 2025-03-31 NOTE — PATIENT INSTRUCTIONS
Please call [facility] at [phone]  to set up the Ultrasound of your lower leg.

## 2025-04-01 ENCOUNTER — TELEPHONE (OUTPATIENT)
Dept: FAMILY MEDICINE | Facility: CLINIC | Age: 65
End: 2025-04-01
Payer: COMMERCIAL

## 2025-04-01 NOTE — TELEPHONE ENCOUNTER
----- Message from Aleida Carrera sent at 4/1/2025  3:23 PM CDT -----  Please call Becca,   The ultrasound suggests an intramuscular hematoma.  We should recheck the US in 1 month to ensure it starts to get smaller and slower reabsorb and to ensure it does not get larger.  I recommend avoiding any rubbing to the area to let it heal.    Order placed for another US in 1 month.   Aleida Carrera PA-C

## 2025-04-01 NOTE — TELEPHONE ENCOUNTER
Called and read word for word, message below per Aleida Carrera PA-C.        Yes, I understand that, we did discuss there was no injury yesterday.  There could have been a microinjury in the muscle that caused a small bleed due to the increase in exercise.  I do not know for sure, but we'll recheck the US in a month and see what that shows.      Aleida Carrera PA-C        Patient stated understanding and agreeable with the plan of care.     Cori MCQUEEN RN  Triage Nurse  [facility]

## 2025-04-01 NOTE — TELEPHONE ENCOUNTER
Yes, I understand that, we did discuss there was no injury yesterday.  There could have been a microinjury in the muscle that caused a small bleed due to the increase in exercise.  I do not know for sure, but we'll recheck the US in a month and see what that shows.     Aleida Carrera PA-C

## 2025-04-01 NOTE — TELEPHONE ENCOUNTER
Called patient, and advised her of the message, results, and advise below per PCP.    Patient stated that she has not hit, bumped, or fallen to causes this, and she would like PCP to advise.    She will also schedule the US recheck for one month.    Cori MCQUEEN RN  Triage Nurse  [facility]

## 2025-05-05 ENCOUNTER — ANCILLARY PROCEDURE (OUTPATIENT)
Dept: ULTRASOUND IMAGING | Facility: CLINIC | Age: 65
End: 2025-05-05
Attending: PHYSICIAN ASSISTANT
Payer: COMMERCIAL

## 2025-05-05 DIAGNOSIS — R22.42 MASS OF LEFT LOWER EXTREMITY: ICD-10-CM

## 2025-05-05 PROCEDURE — 76882 US LMTD JT/FCL EVL NVASC XTR: CPT | Mod: TC | Performed by: STUDENT IN AN ORGANIZED HEALTH CARE EDUCATION/TRAINING PROGRAM

## 2025-05-06 ENCOUNTER — TELEPHONE (OUTPATIENT)
Dept: FAMILY MEDICINE | Facility: CLINIC | Age: 65
End: 2025-05-06
Payer: COMMERCIAL

## 2025-05-06 DIAGNOSIS — R22.42 MASS OF LEFT LOWER EXTREMITY: Primary | ICD-10-CM

## 2025-05-06 NOTE — TELEPHONE ENCOUNTER
Attempted to call patient with number on file to relay pcp's result note below with no answer, left voicemail to call clinic back at [phone].    Darelne Acevedo, RN on 5/6/2025 at 1:18 PM

## 2025-05-06 NOTE — TELEPHONE ENCOUNTER
Patient returned call to clinic.    Relayed provider message.    Gave her number to call and schedule MRI.    Christine M Klisch, RN

## 2025-05-06 NOTE — TELEPHONE ENCOUNTER
Routing message to provider.    Patient stated she has MRI set up for 5/19/25.    Patient is asking if MRI can be done sooner as it is causing pain that makes it difficult to sit, drive and sleep.    This is causing anxiety and she would like answer asap.    Please advise.    Christine M Klisch, RN

## 2025-05-06 NOTE — TELEPHONE ENCOUNTER
----- Message from Aleida Carrera sent at 5/6/2025 12:35 PM CDT -----  Please call Becca, the mass in the left lateral thigh has not changed.  Given it is still present, a contrast MRI is advised.   Please call [facility] at [phone]  to set up the MRI.     Aleida Carrera PA-C

## 2025-05-07 NOTE — TELEPHONE ENCOUNTER
Patient has an appointment for MRI on 5/19/25 is this ok   Well  - 8 Years Old  SOCIAL AND EMOTIONAL DEVELOPMENT  Your 8-year-old:  · Will continue to develop stronger relationships with friends. Your child may begin to identify much more closely with friends than with you or family members.  · May experience increased peer pressure. Other children may influence your child's actions.  · May feel stress in certain situations (such as during tests).  · Shows increased awareness of his or her body. He or she may show increased interest in his or her physical appearance.  · Can better handle conflicts and problem solve.  · May lose his or her temper on occasion (such as in stressful situations).  ENCOURAGING DEVELOPMENT  · Encourage your child to join play groups, sports teams, or after-school programs, or to take part in other social activities outside the home.    · Do things together as a family, and spend time one-on-one with your child.  · Try to enjoy mealtime together as a family. Encourage conversation at mealtime.    · Encourage your child to have friends over (but only when approved by you). Supervise his or her activities with friends.    · Encourage regular physical activity on a daily basis. Take walks or go on bike outings with your child.  · Help your child set and achieve goals. The goals should be realistic to ensure your child's success.      · Limit television and video game time to 1-2 hours each day. Children who watch television or play video games excessively are more likely to become overweight. Monitor the programs your child watches. Keep video games in a family area rather than your child's room. If you have cable, block channels that are not acceptable for young children.  RECOMMENDED IMMUNIZATIONS   · Hepatitis B vaccine. Doses of this vaccine may be obtained, if needed, to catch up on missed doses.  · Tetanus and diphtheria toxoids and acellular pertussis (Tdap) vaccine. Children 7 years old and older who are not fully immunized with  diphtheria and tetanus toxoids and acellular pertussis (DTaP) vaccine should receive 1 dose of Tdap as a catch-up vaccine. The Tdap dose should be obtained regardless of the length of time since the last dose of tetanus and diphtheria toxoid-containing vaccine was obtained. If additional catch-up doses are required, the remaining catch-up doses should be doses of tetanus diphtheria (Td) vaccine. The Td doses should be obtained every 10 years after the Tdap dose. Children aged 7-10 years who receive a dose of Tdap as part of the catch-up series should not receive the recommended dose of Tdap at age 11-12 years.  · Pneumococcal conjugate (PCV13) vaccine. Children with certain conditions should obtain the vaccine as recommended.  · Pneumococcal polysaccharide (PPSV23) vaccine. Children with certain high-risk conditions should obtain the vaccine as recommended.  · Inactivated poliovirus vaccine. Doses of this vaccine may be obtained, if needed, to catch up on missed doses.  · Influenza vaccine. Starting at age 6 months, all children should obtain the influenza vaccine every year. Children between the ages of 6 months and 8 years who receive the influenza vaccine for the first time should receive a second dose at least 4 weeks after the first dose. After that, only a single annual dose is recommended.  · Measles, mumps, and rubella (MMR) vaccine. Doses of this vaccine may be obtained, if needed, to catch up on missed doses.  · Varicella vaccine. Doses of this vaccine may be obtained, if needed, to catch up on missed doses.  · Hepatitis A vaccine. A child who has not obtained the vaccine before 24 months should obtain the vaccine if he or she is at risk for infection or if hepatitis A protection is desired.  · HPV vaccine. Individuals aged 11-12 years should obtain 3 doses. The doses can be started at age 9 years. The second dose should be obtained 1-2 months after the first dose. The third dose should be obtained 24  weeks after the first dose and 16 weeks after the second dose.  · Meningococcal conjugate vaccine. Children who have certain high-risk conditions, are present during an outbreak, or are traveling to a country with a high rate of meningitis should obtain the vaccine.  TESTING  Your child's vision and hearing should be checked. Cholesterol screening is recommended for all children between 9 and 11 years of age. Your child may be screened for anemia or tuberculosis, depending upon risk factors. Your child's health care provider will measure body mass index (BMI) annually to screen for obesity. Your child should have his or her blood pressure checked at least one time per year during a well-child checkup.  If your child is female, her health care provider may ask:  · Whether she has begun menstruating.  · The start date of her last menstrual cycle.  NUTRITION  · Encourage your child to drink low-fat milk and eat at least 3 servings of dairy products per day.  · Limit daily intake of fruit juice to 8-12 oz (240-360 mL) each day.    · Try not to give your child sugary beverages or sodas.    · Try not to give your child fast food or other foods high in fat, salt, or sugar.    · Allow your child to help with meal planning and preparation. Teach your child how to make simple meals and snacks (such as a sandwich or popcorn).     · Encourage your child to make healthy food choices.  · Ensure your child eats breakfast.  · Body image and eating problems may start to develop at this age. Monitor your child closely for any signs of these issues, and contact your health care provider if you have any concerns.  ORAL HEALTH   · Continue to monitor your child's toothbrushing and encourage regular flossing.    · Give your child fluoride supplements as directed by your child's health care provider.    · Schedule regular dental examinations for your child.    · Talk to your child's dentist about dental sealants and whether your child may  "need braces.  SKIN CARE  Protect your child from sun exposure by ensuring your child wears weather-appropriate clothing, hats, or other coverings. Your child should apply a sunscreen that protects against UVA and UVB radiation to his or her skin when out in the sun. A sunburn can lead to more serious skin problems later in life.   SLEEP  · Children this age need 9-12 hours of sleep per day. Your child may want to stay up later, but still needs his or her sleep.  · A lack of sleep can affect your child's participation in his or her daily activities. Watch for tiredness in the mornings and lack of concentration at school.  · Continue to keep bedtime routines.    · Daily reading before bedtime helps a child to relax.    · Try not to let your child watch television before bedtime.  PARENTING TIPS  · Teach your child how to:      Handle bullying. Your child should instruct bullies or others trying to hurt him or her to stop and then walk away or find an adult.      Avoid others who suggest unsafe, harmful, or risky behavior.      Say \"no\" to tobacco, alcohol, and drugs.    · Talk to your child about:      Peer pressure and making good decisions.      The physical and emotional changes of puberty and how these changes occur at different times in different children.      Sex. Answer questions in clear, correct terms.      Feeling sad. Tell your child that everyone feels sad some of the time and that life has ups and downs. Make sure your child knows to tell you if he or she feels sad a lot.    · Talk to your child's teacher on a regular basis to see how your child is performing in school. Remain actively involved in your child's school and school activities. Ask your child if he or she feels safe at school.    · Help your child learn to control his or her temper and get along with siblings and friends. Tell your child that everyone gets angry and that talking is the best way to handle anger. Make sure your child knows to " stay calm and to try to understand the feelings of others.    · Give your child chores to do around the house.  · Teach your child how to handle money. Consider giving your child an allowance. Have your child save his or her money for something special.    · Correct or discipline your child in private. Be consistent and fair in discipline.    · Set clear behavioral boundaries and limits. Discuss consequences of good and bad behavior with your child.  · Acknowledge your child's accomplishments and improvements. Encourage him or her to be proud of his or her achievements.   · Even though your child is more independent now, he or she still needs your support. Be a positive role model for your child and stay actively involved in his or her life. Talk to your child about his or her daily events, friends, interests, challenges, and worries. Increased parental involvement, displays of love and caring, and explicit discussions of parental attitudes related to sex and drug abuse generally decrease risky behaviors.    · You may consider leaving your child at home for brief periods during the day. If you leave your child at home, give him or her clear instructions on what to do.  SAFETY  · Create a safe environment for your child.    Provide a tobacco-free and drug-free environment.    Keep all medicines, poisons, chemicals, and cleaning products capped and out of the reach of your child.    If you have a trampoline, enclose it within a safety fence.    Equip your home with smoke detectors and change the batteries regularly.    If guns and ammunition are kept in the home, make sure they are locked away separately. Your child should not know the lock combination or where the key is kept.  · Talk to your child about safety:    Discuss fire escape plans with your child.    Discuss drug, tobacco, and alcohol use among friends or at friends' homes.    Tell your child that no adult should tell him or her to keep a secret, scare him  or her, or see or handle his or her private parts. Tell your child to always tell you if this occurs.    Tell your child not to play with matches, lighters, and candles.    Tell your child to ask to go home or call you to be picked up if he or she feels unsafe at a party or in someone else's home.  · Make sure your child knows:    How to call your local emergency services (911 in [place] in case of an emergency.    Both parents' complete names and cellular phone or work phone numbers.  · Teach your child about the appropriate use of medicines, especially if your child takes medicine on a regular basis.  · Know your child's friends and their parents.  · Monitor gang activity in your neighborhood or local schools.  · Make sure your child wears a properly-fitting helmet when riding a bicycle, skating, or skateboarding. Adults should set a good example by also wearing helmets and following safety rules.  · Restrain your child in a belt-positioning booster seat until the vehicle seat belts fit properly. The vehicle seat belts usually fit properly when a child reaches a height of 4 ft 9 in (145 cm). This is usually between the ages of 8 and 12 years old. Never allow your 8-year-old to ride in the front seat of a vehicle with airbags.  · Discourage your child from using all-terrain vehicles or other motorized vehicles. If your child is going to ride in them, supervise your child and emphasize the importance of wearing a helmet and following safety rules.  · Trampolines are hazardous. Only one person should be allowed on the trampoline at a time. Children using a trampoline should always be supervised by an adult.  · Know the phone number to the poison control center in your area and keep it by the phone.  WHAT'S NEXT?  Your next visit should be when your child is 9 years old.      This information is not intended to replace advice given to you by your health care provider. Make sure you discuss any questions you have with  your health care provider.     Document Released: 01/07/2008 Document Revised: 01/08/2016 Document Reviewed: 09/02/2014  [employer]vier Interactive Patient Education ©2017 [employer]

## 2025-05-07 NOTE — TELEPHONE ENCOUNTER
Yes, I placed an order for a STAT MRI, given the severe pain.  Please notify patient.     Aleida Carrera PA-C

## 2025-05-07 NOTE — TELEPHONE ENCOUNTER
Patient returned call. Notified of provider's message as written and transferred to Imaging Scheduling.     CHARISSE Oconnor RN  [facility]

## 2025-05-07 NOTE — TELEPHONE ENCOUNTER
Called pt to relay pcp's message below. Pt verbalized understanding and was connected with imaging scheduling this morning with no available appointment at [place] but was offered [place] or [place] today, pt declined due to convenience. Pt stated she is aware of need to be seen sooner if symptoms worsen. RN advised pt the order is STAT which means she can call [place] imaging 5/8/25 in the morning to see if they have available appointments for the day if she is wanting sooner appointment; pt verbalized understanding.     Darlene Acevedo RN on 5/7/2025 at 4:11 PM

## 2025-05-07 NOTE — TELEPHONE ENCOUNTER
Ok to wait until 5/19/25 if pain is manageable.  If not, she can call the radiology scheduling line and get in sooner.     Aleida Carrera PA-C

## 2025-05-07 NOTE — TELEPHONE ENCOUNTER
"Left message on voice mail for patient to call clinic at   [phone] & ask to speak with a Triage Nurse.     Please inform patient that the MRI order has been changed to \"Stat\" and transfer her call to the imaging scheduling line (phone [phone]).     Anahy Vieyra RN BSN  [facility]       "

## 2025-05-12 ENCOUNTER — RESULTS FOLLOW-UP (OUTPATIENT)
Dept: FAMILY MEDICINE | Facility: CLINIC | Age: 65
End: 2025-05-12

## 2025-05-12 ENCOUNTER — OFFICE VISIT (OUTPATIENT)
Dept: FAMILY MEDICINE | Facility: CLINIC | Age: 65
End: 2025-05-12
Payer: COMMERCIAL

## 2025-05-12 VITALS
HEIGHT: 62 IN | DIASTOLIC BLOOD PRESSURE: 78 MMHG | HEART RATE: 54 BPM | OXYGEN SATURATION: 100 % | RESPIRATION RATE: 16 BRPM | BODY MASS INDEX: 37.36 KG/M2 | WEIGHT: 203 LBS | TEMPERATURE: 97.9 F | SYSTOLIC BLOOD PRESSURE: 134 MMHG

## 2025-05-12 DIAGNOSIS — Z78.0 ASYMPTOMATIC POSTMENOPAUSAL STATUS: ICD-10-CM

## 2025-05-12 DIAGNOSIS — N89.8 VAGINAL IRRITATION: ICD-10-CM

## 2025-05-12 DIAGNOSIS — Z00.00 ROUTINE GENERAL MEDICAL EXAMINATION AT A HEALTH CARE FACILITY: Primary | ICD-10-CM

## 2025-05-12 DIAGNOSIS — E78.5 HYPERLIPIDEMIA LDL GOAL <160: ICD-10-CM

## 2025-05-12 DIAGNOSIS — I10 HYPERTENSION GOAL BP (BLOOD PRESSURE) < 140/90: ICD-10-CM

## 2025-05-12 DIAGNOSIS — Z12.31 VISIT FOR SCREENING MAMMOGRAM: ICD-10-CM

## 2025-05-12 DIAGNOSIS — Z12.4 CERVICAL CANCER SCREENING: ICD-10-CM

## 2025-05-12 DIAGNOSIS — Z12.11 SCREEN FOR COLON CANCER: ICD-10-CM

## 2025-05-12 LAB
ANION GAP SERPL CALCULATED.3IONS-SCNC: 9 MMOL/L (ref 7–15)
BUN SERPL-MCNC: 16.4 MG/DL (ref 8–23)
CALCIUM SERPL-MCNC: 9.2 MG/DL (ref 8.8–10.4)
CHLORIDE SERPL-SCNC: 107 MMOL/L (ref 98–107)
CHOLEST SERPL-MCNC: 210 MG/DL
CLUE CELLS: ABNORMAL
CREAT SERPL-MCNC: 0.69 MG/DL (ref 0.51–0.95)
EGFRCR SERPLBLD CKD-EPI 2021: >90 ML/MIN/1.73M2
FASTING STATUS PATIENT QL REPORTED: YES
FASTING STATUS PATIENT QL REPORTED: YES
GLUCOSE SERPL-MCNC: 107 MG/DL (ref 70–99)
HCO3 SERPL-SCNC: 26 MMOL/L (ref 22–29)
HDLC SERPL-MCNC: 51 MG/DL
LDLC SERPL CALC-MCNC: 141 MG/DL
NONHDLC SERPL-MCNC: 159 MG/DL
POTASSIUM SERPL-SCNC: 4 MMOL/L (ref 3.4–5.3)
SODIUM SERPL-SCNC: 142 MMOL/L (ref 135–145)
TRICHOMONAS, WET PREP: ABNORMAL
TRIGL SERPL-MCNC: 88 MG/DL
WBC'S/HIGH POWER FIELD, WET PREP: ABNORMAL
YEAST, WET PREP: ABNORMAL

## 2025-05-12 PROCEDURE — 80061 LIPID PANEL: CPT | Performed by: PHYSICIAN ASSISTANT

## 2025-05-12 PROCEDURE — 36415 COLL VENOUS BLD VENIPUNCTURE: CPT | Performed by: PHYSICIAN ASSISTANT

## 2025-05-12 PROCEDURE — 87210 SMEAR WET MOUNT SALINE/INK: CPT | Performed by: PHYSICIAN ASSISTANT

## 2025-05-12 PROCEDURE — 90750 HZV VACC RECOMBINANT IM: CPT | Performed by: PHYSICIAN ASSISTANT

## 2025-05-12 PROCEDURE — 3075F SYST BP GE 130 - 139MM HG: CPT | Performed by: PHYSICIAN ASSISTANT

## 2025-05-12 PROCEDURE — 82043 UR ALBUMIN QUANTITATIVE: CPT | Performed by: PHYSICIAN ASSISTANT

## 2025-05-12 PROCEDURE — 80048 BASIC METABOLIC PNL TOTAL CA: CPT | Performed by: PHYSICIAN ASSISTANT

## 2025-05-12 PROCEDURE — 99214 OFFICE O/P EST MOD 30 MIN: CPT | Mod: 25 | Performed by: PHYSICIAN ASSISTANT

## 2025-05-12 PROCEDURE — 99397 PER PM REEVAL EST PAT 65+ YR: CPT | Mod: 25 | Performed by: PHYSICIAN ASSISTANT

## 2025-05-12 PROCEDURE — 82570 ASSAY OF URINE CREATININE: CPT | Performed by: PHYSICIAN ASSISTANT

## 2025-05-12 PROCEDURE — 90471 IMMUNIZATION ADMIN: CPT | Performed by: PHYSICIAN ASSISTANT

## 2025-05-12 PROCEDURE — 87624 HPV HI-RISK TYP POOLED RSLT: CPT | Performed by: PHYSICIAN ASSISTANT

## 2025-05-12 PROCEDURE — G2211 COMPLEX E/M VISIT ADD ON: HCPCS | Performed by: PHYSICIAN ASSISTANT

## 2025-05-12 PROCEDURE — 3078F DIAST BP <80 MM HG: CPT | Performed by: PHYSICIAN ASSISTANT

## 2025-05-12 RX ORDER — NYSTATIN 100000 [USP'U]/G
POWDER TOPICAL DAILY PRN
Qty: 60 G | Refills: 1 | Status: CANCELLED | OUTPATIENT
Start: 2025-05-12

## 2025-05-12 RX ORDER — METOPROLOL SUCCINATE 50 MG/1
50 TABLET, EXTENDED RELEASE ORAL DAILY
Qty: 90 TABLET | Refills: 3 | Status: SHIPPED | OUTPATIENT
Start: 2025-05-12

## 2025-05-12 RX ORDER — AMLODIPINE BESYLATE 10 MG/1
10 TABLET ORAL DAILY
Qty: 90 TABLET | Refills: 3 | Status: SHIPPED | OUTPATIENT
Start: 2025-05-12

## 2025-05-12 RX ORDER — CLOTRIMAZOLE 1 %
CREAM (GRAM) TOPICAL 2 TIMES DAILY
Qty: 30 G | Refills: 0 | Status: SHIPPED | OUTPATIENT
Start: 2025-05-12 | End: 2025-05-26

## 2025-05-12 SDOH — HEALTH STABILITY: PHYSICAL HEALTH: ON AVERAGE, HOW MANY DAYS PER WEEK DO YOU ENGAGE IN MODERATE TO STRENUOUS EXERCISE (LIKE A BRISK WALK)?: 5 DAYS

## 2025-05-12 ASSESSMENT — ASTHMA QUESTIONNAIRES
QUESTION_3 LAST FOUR WEEKS HOW OFTEN DID YOUR ASTHMA SYMPTOMS (WHEEZING, COUGHING, SHORTNESS OF BREATH, CHEST TIGHTNESS OR PAIN) WAKE YOU UP AT NIGHT OR EARLIER THAN USUAL IN THE MORNING: NOT AT ALL
QUESTION_2 LAST FOUR WEEKS HOW OFTEN HAVE YOU HAD SHORTNESS OF BREATH: NOT AT ALL
QUESTION_5 LAST FOUR WEEKS HOW WOULD YOU RATE YOUR ASTHMA CONTROL: COMPLETELY CONTROLLED
QUESTION_1 LAST FOUR WEEKS HOW MUCH OF THE TIME DID YOUR ASTHMA KEEP YOU FROM GETTING AS MUCH DONE AT WORK, SCHOOL OR AT HOME: NONE OF THE TIME
ACT_TOTALSCORE: 25
QUESTION_4 LAST FOUR WEEKS HOW OFTEN HAVE YOU USED YOUR RESCUE INHALER OR NEBULIZER MEDICATION (SUCH AS ALBUTEROL): NOT AT ALL

## 2025-05-12 ASSESSMENT — SOCIAL DETERMINANTS OF HEALTH (SDOH): HOW OFTEN DO YOU GET TOGETHER WITH FRIENDS OR RELATIVES?: ONCE A WEEK

## 2025-05-12 NOTE — PROGRESS NOTES
Preventive Care Visit  [facility] VELIA Carrera PA-C, Family Medicine  May 12, 2025      Assessment & Plan     Routine general medical examination at a health care facility      HEALTH CARE MAINTENANCE              Reviewed USPTF recommendations and anticipatory guidance.              See orders.       Screen for colon cancer  She prefers a less invasive screening tool.  I discussed the importance of colorectal cancer screening, including the risks and benefits of the various procedures, including colonoscopy and cologuard.  Patient has opted to do the cologuard.     - COLOGUARD([employer]); Future    Hyperlipidemia LDL goal <160  Will continue to monitor levels, not on statin.     Visit for screening mammogram  I discussed in detail with Caty Llamas the importance of breast cancer screening through monthly self breast exams and annual breast exams in the clinic.    - MA Screening Bilateral w/ [place]; Future    Hypertension goal BP (blood pressure) < 140/90  BP stable, will leave meds as is.   - BASIC METABOLIC PANEL  - Albumin Random Urine Quantitative with Creat Ratio  - metoprolol succinate ER (TOPROL XL) 50 MG 24 hr tablet; Take 1 tablet (50 mg) by mouth daily.  - amLODIPine (NORVASC) 10 MG tablet; Take 1 tablet (10 mg) by mouth daily.    Cervical cancer screening  Due for repeat testing  - HPV and Gynecologic Cytology Panel - Recommended Age 30 - 65 Years  - Gynecologic Cytology (PAP)    Asymptomatic postmenopausal status  Recommend DEXA screening.   - DEXA HIP/PELVIS/SPINE - Future; Future    Vaginal irritation  Exam normal today:   Vaginal irritation:  I suggest we first do a trial on an antifungal cream.  Apply twice per day x 14 days.  Keep area dry and aired out (do not wear any restrictive clothing, try wearing loose clothes).  Breathable cotton underwear advised.     If the irritation does not resolve with this, your symptoms likely are from a lack of estrogen to the  "area (atrophic vaginitis, see attached info).  In that case, we would start a low dose topical estrogen cream.  The risk of topical estrogen therapy is very low (as the medication is just absorbing in that area).  Please message me if you would like to try the topical estrogen in about 1 month.       - clotrimazole (LOTRIMIN) 1 % external cream; Apply topically 2 times daily for 14 days.  - Wet prep - Clinic Collect    Patient has been advised of split billing requirements and indicates understanding: Yes        BMI  Estimated body mass index is 37.36 kg/m  as calculated from the following:    Height as of this encounter: 1.57 m (5' 1.81\").    Weight as of this encounter: 92.1 kg (203 lb).   Weight management plan: Discussed healthy diet and exercise guidelines    Counseling  Appropriate preventive services were addressed with this patient via screening, questionnaire, or discussion as appropriate for fall prevention, nutrition, physical activity, Tobacco-use cessation, social engagement, weight loss and cognition.  Checklist reviewing preventive services available has been given to the patient.  Reviewed patient's diet, addressing concerns and/or questions.   The patient was instructed to see the dentist every 6 months.   Discussed possible causes of fatigue.     The longitudinal plan of care for the diagnosis(es)/condition(s) as documented were addressed during this visit. Due to the added complexity in care, I will continue to support Becca in the subsequent management and with ongoing continuity of care.      Subjective   Becca is a 65 year old, presenting for the following:  Physical        5/12/2025     7:18 AM   Additional Questions   Roomed by Opal   Accompanied by Self         5/12/2025     7:18 AM   Patient Reported Additional Medications   Patient reports taking the following new medications NA          HPI       Patient with history of Hypertension and Asthma arrived for Annual Physical, due for PAP " Follow-up, undressing for exam. ACT completed online.    Patient is fasting for lab work.     Patients Mothers recently passed.     She didn't feel comfortable with the last colonoscopy, as it was very invasive.     She c/o chronic itching in groin area (not vaginal area).  Will last 30 minutes and then calms down.  No discharge.   Present for 15 years.  Worse when she is undressing for the day.     Hypertension Follow-up    Do you check your blood pressure regularly outside of the clinic? Yes   Are you following a low salt diet? Yes  Are your blood pressures ever more than 140 on the top number (systolic) OR more   than 90 on the bottom number (diastolic), for example 140/90? No    BP Readings from Last 2 Encounters:   05/12/25 134/78   03/31/25 136/82     Asthma  Patient has not had an ACT done in this encounter: Link to ACT Flowsheet       5/12/2025     7:11 AM   ACT Total Scores   ACT TOTAL SCORE (Goal Greater than or Equal to 20) 25    In the past 12 months, how many times did you visit the emergency room for your asthma without being admitted to the hospital? 0   In the past 12 months, how many times were you hospitalized overnight because of your asthma? 0       Patient-reported     Do you have any of the following symptoms? None of these symptoms (cough/noisy breathing/trouble with breathing)  What makes your asthma/breathing worse?  Cold air  Do you want more information about how to use your inhaler? No    Advance Care Planning    Discussed advance care planning with patient; informed AVS has link to [facility] [employer].        5/12/2025   General Health   How would you rate your overall physical health? Good   Feel stress (tense, anxious, or unable to sleep) Only a little   (!) STRESS CONCERN      5/12/2025   Nutrition   Diet: Regular (no restrictions)         5/12/2025   Exercise   Days per week of moderate/strenous exercise 5 days         5/12/2025   Social Factors   Frequency of gathering with friends or  relatives Once a week   Worry food won't last until get money to buy more No   Food not last or not have enough money for food? No   Do you have housing? (Housing is defined as stable permanent housing and does not include staying outside in a car, in a tent, in an abandoned building, in an overnight shelter, or couch-surfing.) Yes   Are you worried about losing your housing? No   Lack of transportation? No   Unable to get utilities (heat,electricity)? No         5/12/2025   Fall Risk   Fallen 2 or more times in the past year? No   Trouble with walking or balance? No          5/12/2025   Activities of Daily Living- Home Safety   Needs help with the following daily activites None of the above   Safety concerns in the home None of the above         5/12/2025   Dental   Dentist two times every year? (!) NO         5/12/2025   Hearing Screening   Hearing concerns? None of the above         5/12/2025   Driving Risk Screening   Patient/family members have concerns about driving No         5/12/2025   General Alertness/Fatigue Screening   Have you been more tired than usual lately? (!) YES         5/12/2025   Urinary Incontinence Screening   Bothered by leaking urine in past 6 months No         Today's PHQ-2 Score:       5/12/2025     7:10 AM   PHQ-2 ( 1999 [employer])   Q1: Little interest or pleasure in doing things 0   Q2: Feeling down, depressed or hopeless 0   PHQ-2 Score 0    Q1: Little interest or pleasure in doing things Not at all   Q2: Feeling down, depressed or hopeless Not at all   PHQ-2 Score 0       Patient-reported           5/12/2025   Substance Use   Alcohol more than 3/day or more than 7/wk No   Do you have a current opioid prescription? No   How severe/bad is pain from 1 to 10? 0/10 (No Pain)   Do you use any other substances recreationally? No     Social History     Tobacco Use    Smoking status: Never    Smokeless tobacco: Never    Tobacco comments:     smoke every blue moon   Vaping Use    Vaping status:  Never Used   Substance Use Topics    Alcohol use: Yes     Comment: very occ    Drug use: No           6/13/2022   LAST FHS-7 RESULTS   1st degree relative breast or ovarian cancer No   Any relative bilateral breast cancer No   Any male have breast cancer No   Any ONE woman have BOTH breast AND ovarian cancer No   Any woman with breast cancer before 50yrs No   2 or more relatives with breast AND/OR ovarian cancer No   2 or more relatives with breast AND/OR bowel cancer No        Mammogram Screening - After age 74- determine frequency with patient based on health status, life expectancy and patient goals      History of abnormal Pap smear: No - age 65 or older with pap indicated due to inadequate prior screening (3 consecutive negative cytology results, 2 consecutive negative cotesting results, or 2 consecutive negative HrHPV test results within 10 years, with the most recent test occurring within the recommended screening interval for the test used)        Latest Ref Rng & Units 2/26/2024     8:10 AM 5/24/2021     8:15 AM 5/24/2021     8:00 AM   PAP / HPV   PAP  Negative for Intraepithelial Lesion or Malignancy (NILM)      PAP (Historical)   NIL     HPV 16 DNA Negative Negative   Negative    HPV 18 DNA Negative Negative   Negative    Other HR HPV Negative Positive   Negative      ASCVD Risk   The 10-year ASCVD risk score (Anjana DK, et al., 2019) is: 10.6%    Values used to calculate the score:      Age: 65 years      Sex: Female      Is Non- : No      Diabetic: No      Tobacco smoker: No      Systolic Blood Pressure: 134 mmHg      Is BP treated: Yes      HDL Cholesterol: 49 mg/dL      Total Cholesterol: 208 mg/dL            Reviewed and updated as needed this visit by Provider                    Past Medical History:   Diagnosis Date    Abnormal Pap smear of cervix 2011    see problem list    CARDIOVASCULAR SCREENING; LDL GOAL LESS THAN 160 5/9/2010    Cervical high risk HPV (human  papillomavirus) test positive 03/27/2017, 2019    see problem list    Hx of colposcopy with cervical biopsy 2011    wnl    Hypertension goal BP (blood pressure) < 140/90 12/5/2011    Status post colposcopy 05/02/2017    ECC - negative for dysplasia     Past Surgical History:   Procedure Laterality Date    ARTHROSCOPY KNEE RT/LT  1988, 1994    left knee    s/p knee reconstructive surgery Left      Lab work is in process  Current providers sharing in care for this patient include:  Patient Care Team:  Aleida Carrera PA-C as PCP - General (Family Medicine)  Aleida Carrera PA-C as Assigned PCP    The following health maintenance items are reviewed in Epic and correct as of today:  Health Maintenance   Topic Date Due    ZOSTER IMMUNIZATION (1 of 2) Never done    RSV VACCINE (1 - Risk 60-74 years 1-dose series) Never done    COLORECTAL CANCER SCREENING  03/26/2020    LIPID  05/10/2022    ANNUAL REVIEW OF HM ORDERS  06/06/2023    MAMMO SCREENING  06/13/2023    COVID-19 Vaccine (4 - 2024-25 season) 09/01/2024    BMP  01/12/2025    MEDICARE ANNUAL WELLNESS VISIT  02/26/2025    MICROALBUMIN  02/26/2025    ASTHMA ACTION PLAN  02/26/2025    PAP FOLLOW-UP  02/26/2025    HPV FOLLOW-UP  02/26/2025    DEXA  03/11/2025    INFLUENZA VACCINE (Season Ended) 09/01/2025    ASTHMA CONTROL TEST  11/12/2025    FALL RISK ASSESSMENT  05/12/2026    ADVANCE CARE PLANNING  05/17/2026    DIABETES SCREENING  01/12/2027    DTAP/TDAP/TD IMMUNIZATION (3 - Td or Tdap) 05/13/2029    HEPATITIS C SCREENING  Completed    PHQ-2 (once per calendar year)  Completed    Pneumococcal Vaccine: 50+ Years  Completed    HPV IMMUNIZATION  Aged Out    MENINGITIS IMMUNIZATION  Aged Out    PAP  Discontinued         Review of Systems  Constitutional, neuro, ENT, endocrine, pulmonary, cardiac, gastrointestinal, genitourinary, musculoskeletal, integument and psychiatric systems are negative, except as otherwise noted.     Objective    Exam  /78 (BP  "Location: Right arm, Patient Position: Chair, Cuff Size: Adult Regular)   Pulse 54   Temp 97.9  F (36.6  C) (Temporal)   Resp 16   Ht 1.57 m (5' 1.81\")   Wt 92.1 kg (203 lb)   LMP  (LMP Unknown)   SpO2 100%   BMI 37.36 kg/m     Estimated body mass index is 37.36 kg/m  as calculated from the following:    Height as of this encounter: 1.57 m (5' 1.81\").    Weight as of this encounter: 92.1 kg (203 lb).    Physical Exam  GENERAL: alert and no distress  EYES: Eyes grossly normal to inspection, PERRL and conjunctivae and sclerae normal  HENT: ear canals and TM's normal, nose and mouth without ulcers or lesions  NECK: no adenopathy, no asymmetry, masses, or scars  RESP: lungs clear to auscultation - no rales, rhonchi or wheezes  CV: regular rate and rhythm, normal S1 S2, no S3 or S4, no murmur, click or rub, no peripheral edema  ABDOMEN: soft, nontender, no hepatosplenomegaly, no masses and bowel sounds normal  MS: no gross musculoskeletal defects noted, no edema  SKIN: no suspicious lesions or rashes  NEURO: Normal strength and tone, mentation intact and speech normal  PSYCH: mentation appears normal, affect normal/bright.   Genitalia:  Normal external female genitalia, no lesions noted.  Vagina and Cervix without discharge or lesions.  No adnexal or uterine masses noted.  No cervical motion tenderness noted.  Breasts:  Breasts symmetric without masses, no nipple discharge, no axillary lymphadopathy.  I discussed the importance of self breast exam with patient and showed her how to perform them.            5/12/2025   Mini Cog   Clock Draw Score 2 Normal   3 Item Recall 3 objects recalled   Mini Cog Total Score 5             Signed Electronically by: Aleida Carrera PA-C    "

## 2025-05-12 NOTE — PATIENT INSTRUCTIONS
Please call [phone] to schedule the mammogram  Please call [facility] Scheduling at [phone]  to set up the DEXA scan    Vaginal irritation:  I suggest we first do a trial on an antifungal cream.  Apply twice per day x 14 days.  Keep area dry and aired out (do not wear any restrictive clothing, try wearing loose clothes).  Breathable cotton underwear advised.     If the irritation does not resolve with this, your symptoms likely are from a lack of estrogen to the area (atrophic vaginitis, see attached info).  In that case, we would start a low dose topical estrogen cream.  The risk of topical estrogen therapy is very low (as the medication is just absorbing in that area).  Please message me if you would like to try the topical estrogen in about 1 month.             Patient Education   Preventive Care Advice   This is general advice given by our system to help you stay healthy. However, your care team may have specific advice just for you. Please talk to your care team about your preventive care needs.  Nutrition  Eat 5 or more servings of fruits and vegetables each day.  Try wheat bread, brown rice and whole grain pasta (instead of white bread, rice, and pasta).  Get enough calcium and vitamin D. Check the label on foods and aim for 100% of the RDA (recommended daily allowance).  Lifestyle  Exercise at least 150 minutes each week  (30 minutes a day, 5 days a week).  Do muscle strengthening activities 2 days a week. These help control your weight and prevent disease.  No smoking.  Wear sunscreen to prevent skin cancer.  Have a dental exam and cleaning every 6 months.  Yearly exams  See your health care team every year to talk about:  Any changes in your health.  Any medicines your care team has prescribed.  Preventive care, family planning, and ways to prevent chronic diseases.  Shots (vaccines)   HPV shots (up to age 26), if you've never had them before.  Hepatitis B shots (up to age 59), if you've  never had them before.  COVID-19 shot: Get this shot when it's due.  Flu shot: Get a flu shot every year.  Tetanus shot: Get a tetanus shot every 10 years.  Pneumococcal, hepatitis A, and RSV shots: Ask your care team if you need these based on your risk.  Shingles shot (for age 50 and up)  General health tests  Diabetes screening:  Starting at age 35, Get screened for diabetes at least every 3 years.  If you are younger than age 35, ask your care team if you should be screened for diabetes.  Cholesterol test: At age 39, start having a cholesterol test every 5 years, or more often if advised.  Bone density scan (DEXA): At age 50, ask your care team if you should have this scan for osteoporosis (brittle bones).  Hepatitis C: Get tested at least once in your life.  STIs (sexually transmitted infections)  Before age 24: Ask your care team if you should be screened for STIs.  After age 24: Get screened for STIs if you're at risk. You are at risk for STIs (including HIV) if:  You are sexually active with more than one person.  You don't use condoms every time.  You or a partner was diagnosed with a sexually transmitted infection.  If you are at risk for HIV, ask about PrEP medicine to prevent HIV.  Get tested for HIV at least once in your life, whether you are at risk for HIV or not.  Cancer screening tests  Cervical cancer screening: If you have a cervix, begin getting regular cervical cancer screening tests starting at age 21.  Breast cancer scan (mammogram): If you've ever had breasts, begin having regular mammograms starting at age 40. This is a scan to check for breast cancer.  Colon cancer screening: It is important to start screening for colon cancer at age 45.  Have a colonoscopy test every 10 years (or more often if you're at risk) Or, ask your provider about stool tests like a FIT test every year or Cologuard test every 3 years.  To learn more about your testing options, visit:   .  For help making a decision,  visit:   https://bit.ly/pi05882.  Prostate cancer screening test: If you have a prostate, ask your care team if a prostate cancer screening test (PSA) at age 55 is right for you.  Lung cancer screening: If you are a current or former smoker ages 50 to 80, ask your care team if ongoing lung cancer screenings are right for you.  For informational purposes only. Not to replace the advice of your health care provider. Copyright   2023 [employer]. All rights reserved. Clinically reviewed by the  [employer] [facility]. [employer] [id number] - REV 01/24.  Learning About Sleeping Well  What does sleeping well mean?     Sleeping well means getting enough sleep to feel good and stay healthy. How much sleep is enough varies among people.  The number of hours you sleep and how you feel when you wake up are both important. If you do not feel refreshed, you probably need more sleep. Another sign of not getting enough sleep is feeling tired during the day.  Experts recommend that adults get at least 7 or more hours of sleep per day. Children and older adults need more sleep.  Why is getting enough sleep important?  Getting enough quality sleep is a basic part of good health. When your sleep suffers, your physical health, mood, and your thoughts can suffer too. You may find yourself feeling more grumpy or stressed. Not getting enough sleep also can lead to serious problems, including injury, accidents, anxiety, and depression.  What might cause poor sleeping?  Many things can cause sleep problems, including:  Changes to your sleep schedule.  Stress. Stress can be caused by fear about a single event, such as giving a speech. Or you may have ongoing stress, such as worry about work or school.  Depression, anxiety, and other mental or emotional conditions.  Changes in your sleep habits or surroundings. This includes changes that happen where you sleep, such as noise, light, or sleeping in a different bed. It  "also includes changes in your sleep pattern, such as having jet lag or working a late shift.  Health problems, such as pain, breathing problems, and restless legs syndrome.  Lack of regular exercise.  Using alcohol, nicotine, or caffeine before bed.  How can you help yourself?  Here are some tips that may help you sleep more soundly and wake up feeling more refreshed.  Your sleeping area   Use your bedroom only for sleeping and sex. A bit of light reading may help you fall asleep. But if it doesn't, do your reading elsewhere in the house. Try not to use your TV, computer, smartphone, or tablet while you are in bed.  Be sure your bed is big enough to stretch out comfortably, especially if you have a sleep partner.  Keep your bedroom quiet, dark, and cool. Use curtains, blinds, or a sleep mask to block out light. To block out noise, use earplugs, soothing music, or a \"white noise\" machine.  Your evening and bedtime routine   Create a relaxing bedtime routine. You might want to take a warm shower or bath, or listen to soothing music.  Go to bed at the same time every night. And get up at the same time every morning, even if you feel tired.  What to avoid   Limit caffeine (coffee, tea, caffeinated sodas) during the day, and don't have any for at least 6 hours before bedtime.  Avoid drinking alcohol before bedtime. Alcohol can cause you to wake up more often during the night.  Try not to smoke or use tobacco, especially in the evening. Nicotine can keep you awake.  Limit naps during the day, especially close to bedtime.  Avoid lying in bed awake for too long. If you can't fall asleep or if you wake up in the middle of the night and can't get back to sleep within about 20 minutes, get out of bed and go to another room until you feel sleepy.  Avoid taking medicine right before bed that may keep you awake or make you feel hyper or energized. Your doctor can tell you if your medicine may do this and if you can take it " "earlier in the day.  If you can't sleep   Imagine yourself in a peaceful, pleasant scene. Focus on the details and feelings of being in a place that is relaxing.  Get up and do a quiet or boring activity until you feel sleepy.  Avoid drinking any liquids before going to bed to help prevent waking up often to use the bathroom.  Where can you learn more?  Go to https://www.[employer].net/patiented  Enter J942 in the search box to learn more about \"Learning About Sleeping Well.\"  Current as of: July 31, 2024  Content Version: 14.4    2264-7035 [email or web address].   Care instructions adapted under license by your healthcare professional. If you have questions about a medical condition or this instruction, always ask your healthcare professional. [email or web address] disclaims any warranty or liability for your use of this information.       "

## 2025-05-12 NOTE — LETTER
My Asthma Action Plan    Name: Caty Llamas   YOB: 1960  Date: 5/12/2025   My doctor: Aleida Carrera PA-C   [facility]        My Rescue Medicine: Albuterol (Proair/Ventolin/Proventil HFA) 2-4 puffs EVERY 4 HOURS as needed. Use a spacer if recommended by your provider.   My Asthma Severity:   Intermittent / Exercise Induced  Know your asthma triggers: upper respiratory infections and cold air             GREEN ZONE   Good Control  I feel good  No cough or wheeze  Can work, sleep and play without asthma symptoms       Take your asthma control medicine every day.     If exercise triggers your asthma, take your rescue medication  15 minutes before exercise or sports, and  During exercise if you have asthma symptoms  Spacer to use with inhaler: If you have a spacer, make sure to use it with your inhaler             YELLOW ZONE Getting Worse  I have ANY of these:  I do not feel good  Cough or wheeze  Chest feels tight  Wake up at night   Keep taking your Green Zone medications  Start taking your rescue medicine:  every 20 minutes for up to 1 hour. Then every 4 hours for 24-48 hours.  If you stay in the Yellow Zone for more than 12-24 hours, contact your doctor.  If you do not return to the Green Zone in 12-24 hours or you get worse, start taking your oral steroid medicine if prescribed by your provider.           RED ZONE Medical Alert - Get Help  I have ANY of these:  I feel awful  Medicine is not helping  Breathing getting harder  Trouble walking or talking  Nose opens wide to breathe       Take your rescue medicine NOW  If your provider has prescribed an oral steroid medicine, start taking it NOW  Call your doctor NOW  If you are still in the Red Zone after 20 minutes and you have not reached your doctor:  Take your rescue medicine again and  Call 911 or go to the emergency room right away    See your regular doctor within 2 weeks of an Emergency Room or Urgent  Care visit for follow-up treatment.          Annual Reminders:  Meet with Asthma Educator,  Flu Shot in the Fall, consider Pneumonia Vaccination for patients with asthma (aged 19 and older).    Pharm[id number] [address] [facility]    Electronically signed by Aleida Carrera PA-C   Date: 05/12/25                    Asthma Triggers  How To Control Things That Make Your Asthma Worse    Triggers are things that make your asthma worse.  Look at the list below to help you find your triggers and   what you can do about them. You can help prevent asthma flare-ups by staying away from your triggers.      Trigger                                                          What you can do   Cigarette Smoke  Tobacco smoke can make asthma worse. Do not allow smoking in your home, car or around you.  Be sure no one smokes at a child s day care or school.  If you smoke, ask your health care provider for ways to help you quit.  Ask family members to quit too.  Ask your health care provider for a referral to Quit Plan to help you quit smoking, or call 4-474-833-PLAN.     Colds, Flu, Bronchitis  These are common triggers of asthma. Wash your hands often.  Don t touch your eyes, nose or mouth.  Get a flu shot every year.     Dust Mites  These are tiny bugs that live in cloth or carpet. They are too small to see. Wash sheets and blankets in hot water every week.   Encase pillows and mattress in dust mite proof covers.  Avoid having carpet if you can. If you have carpet, vacuum weekly.   Use a dust mask and HEPA vacuum.   Pollen and Outdoor Mold  Some people are allergic to trees, grass, or weed pollen, or molds. Try to keep your windows closed.  Limit time out doors when pollen count is high.   Ask you health care provider about taking medicine during allergy season.     Animal Dander  Some people are allergic to skin flakes, urine or saliva from pets with fur or  feathers. Keep pets with fur or feathers out of your home.    If you can t keep the pet outdoors, then keep the pet out of your bedroom.  Keep the bedroom door closed.  Keep pets off cloth furniture and away from stuffed toys.     Mice, Rats, and Cockroaches  Some people are allergic to the waste from these pests.   Cover food and garbage.  Clean up spills and food crumbs.  Store grease in the refrigerator.   Keep food out of the bedroom.   Indoor Mold  This can be a trigger if your home has high moisture. Fix leaking faucets, pipes, or other sources of water.   Clean moldy surfaces.  Dehumidify basement if it is damp and smelly.   Smoke, Strong Odors, and Sprays  These can reduce air quality. Stay away from strong odors and sprays, such as perfume, powder, hair spray, paints, smoke incense, paint, cleaning products, candles and new carpet.   Exercise or Sports  Some people with asthma have this trigger. Be active!  Ask your doctor about taking medicine before sports or exercise to prevent symptoms.    Warm up for 5-10 minutes before and after sports or exercise.     Other Triggers of Asthma  Cold air:  Cover your nose and mouth with a scarf.  Sometimes laughing or crying can be a trigger.  Some medicines and food can trigger asthma.

## 2025-05-13 LAB
CREAT UR-MCNC: 125.9 MG/DL
HPV HR 12 DNA CVX QL NAA+PROBE: NEGATIVE
HPV16 DNA CVX QL NAA+PROBE: NEGATIVE
HPV18 DNA CVX QL NAA+PROBE: NEGATIVE
HUMAN PAPILLOMA VIRUS FINAL DIAGNOSIS: NORMAL
MICROALBUMIN UR-MCNC: <12 MG/L
MICROALBUMIN/CREAT UR: NORMAL MG/G{CREAT}

## 2025-05-14 ENCOUNTER — TELEPHONE (OUTPATIENT)
Dept: FAMILY MEDICINE | Facility: CLINIC | Age: 65
End: 2025-05-14
Payer: COMMERCIAL

## 2025-05-14 NOTE — TELEPHONE ENCOUNTER
I recommend she schedule an appt with an orthopedist to discuss the knee replacement.  Does she have an orthopedist she is seeing?  If not, I'll place a referral.    After the consult with the specialist, once there is a surgery date, then she would want to schedule a preop with me.     Aleida Carrera PA-C

## 2025-05-14 NOTE — TELEPHONE ENCOUNTER
FYI - Status Update    Who is Calling: patient    Update: Becca would like you to give her a call, she is ready to do her knee replacement. She did schedule an appt but it is booked out til July 2. She took the first avail appt.     Does caller want a call/response back: Yes     Okay to leave a detailed message?: Yes at Home number on file [phone] (home)

## 2025-05-15 NOTE — TELEPHONE ENCOUNTER
Informed patient about message below. Transferred to [facility] to get connected with a orthopedist there.  Wandy Lujan  [facility]

## 2025-05-19 ENCOUNTER — ANCILLARY PROCEDURE (OUTPATIENT)
Dept: MRI IMAGING | Facility: CLINIC | Age: 65
End: 2025-05-19
Attending: PHYSICIAN ASSISTANT
Payer: COMMERCIAL

## 2025-05-19 DIAGNOSIS — R22.42 MASS OF LEFT LOWER EXTREMITY: ICD-10-CM

## 2025-05-19 PROCEDURE — 73720 MRI LWR EXTREMITY W/O&W/DYE: CPT | Mod: LT | Performed by: RADIOLOGY

## 2025-05-19 PROCEDURE — A9585 GADOBUTROL INJECTION: HCPCS | Performed by: RADIOLOGY

## 2025-05-19 RX ORDER — GADOBUTROL 604.72 MG/ML
0.1 INJECTION INTRAVENOUS ONCE
Status: COMPLETED | OUTPATIENT
Start: 2025-05-19 | End: 2025-05-19

## 2025-05-19 RX ADMIN — GADOBUTROL 9 ML: 604.72 INJECTION INTRAVENOUS at 18:56

## 2025-05-20 ENCOUNTER — RESULTS FOLLOW-UP (OUTPATIENT)
Dept: FAMILY MEDICINE | Facility: CLINIC | Age: 65
End: 2025-05-20

## 2025-05-20 ENCOUNTER — TELEPHONE (OUTPATIENT)
Dept: FAMILY MEDICINE | Facility: CLINIC | Age: 65
End: 2025-05-20
Payer: COMMERCIAL

## 2025-05-20 DIAGNOSIS — R22.42 MASS OF LEFT LOWER EXTREMITY: Primary | ICD-10-CM

## 2025-05-20 LAB — RADIOLOGIST FLAGS: NORMAL

## 2025-05-20 NOTE — TELEPHONE ENCOUNTER
Routing message to provider.    Imaging calling with finding.    Impression # 1    . 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  Referral to orthopedic oncology at the [facility] for  definitive diagnosis with biopsy/histopathology.    Christine M Klisch, RN

## 2025-05-20 NOTE — TELEPHONE ENCOUNTER
Called and left message to Becca I wanted to discuss MRI result.  I will call back in 1 hour to see if I can reach her later.     Aleida Carrera PA-C

## 2025-05-21 NOTE — TELEPHONE ENCOUNTER
Called and spoke to Becca about MRI and possible soft tissue sarcoma.  Urgent referral placed for oncology orthopedist and number given to patient.   Aleida Carrera PA-C

## 2025-05-22 NOTE — TELEPHONE ENCOUNTER
DIAGNOSIS: LEFT LOWER EXTREMITY MASS   APPOINTMENT DATE: 05/23/2025   NOTES STATUS DETAILS   OFFICE NOTE from referring provider Internal 03/31/2025 -   Aleida Carrera PA-C  Family Medicine   OFFICE NOTE from other specialist Internal 03/22/2025  [facility]     OPERATIVE REPORT Internal 01/26/2006- OPERATIVE PROCEDURES PERFORMED:    1.  Partial medial meniscectomy.  2.  Loose body removal.  3.  ACL thermal shrinkage.    [id number] - Unlisted Knee Surgery   (IMAGES & REPORTS) Internal

## 2025-05-23 ENCOUNTER — PRE VISIT (OUTPATIENT)
Dept: ORTHOPEDICS | Facility: CLINIC | Age: 65
End: 2025-05-23

## 2025-05-23 PROCEDURE — 88341 IMHCHEM/IMCYTCHM EA ADD ANTB: CPT | Mod: TC | Performed by: ORTHOPAEDIC SURGERY

## 2025-05-29 ENCOUNTER — RESULTS FOLLOW-UP (OUTPATIENT)
Dept: OTHER | Facility: CLINIC | Age: 65
End: 2025-05-29

## 2025-05-29 ENCOUNTER — CARE COORDINATION (OUTPATIENT)
Dept: ORTHOPEDICS | Facility: CLINIC | Age: 65
End: 2025-05-29
Payer: COMMERCIAL

## 2025-05-29 ENCOUNTER — PATIENT OUTREACH (OUTPATIENT)
Dept: ONCOLOGY | Facility: CLINIC | Age: 65
End: 2025-05-29
Payer: COMMERCIAL

## 2025-05-29 DIAGNOSIS — C49.9 UNDIFFERENTIATED PLEOMORPHIC SARCOMA (H): Primary | ICD-10-CM

## 2025-05-29 NOTE — PROGRESS NOTES
Referral received for Radiation Oncology from Orthopedic Oncology care team. 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 Referral placed for consult for radiation therapy for RT options. Referral to Radiation Oncology discussed with patient per documentation dated 5/29/25 per chart review.. Referral instructions updated and sent to [facility] for completion and scheduling.

## 2025-05-29 NOTE — TELEPHONE ENCOUNTER
MEDICAL RECORDS REQUEST   Radiation Oncology  [address] [place] [address]  Fax: [phone]          FUTURE VISIT INFORMATION                                                   Caty Llamas, : 1960 scheduled for future visit at [facility] Radiation Oncology    RECORDS REQUESTED FOR VISIT                                                     SOFT TISSUE     OFFICE NOTE from PCP Epic 25: Aleida Carrera PA-C   OFFICE NOTE from Ortho [facility] 25: Dr. Jose R Hammond   OPERATIVE/BIOPSY REPORTS Epic 25: Soft tissue mass, left thigh, biopsy    MEDICATION LIST [facility]    LABS     PATHOLOGY REPORTS Report in Epic 25: [id number]    ANYTHING RELATED TO DIAGNOSIS Epic Most recent 25   IMAGING (NEED IMAGES & REPORT)     MRI PACS 25: MR Femur   ULTRASOUND PACS 25, 3/31/25: US Lower Extremity

## 2025-05-29 NOTE — PROGRESS NOTES
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  She is referred by Dr. Hammond for consultation to discuss the potential role for radiotherapy.    Past radiation oncologist: None  Medical oncologist: None, heme onc referral has been placed  Orthopedic/surgical oncologist: Dr. 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 and femoral vessels.    The pt noted new swelling of the lateral left thigh recently; the fullness has persisted with intermittent discomfort but no antecedent trauma. Referring MDs observed for four weeks before obtaining an MRI.    MRI of the left femur demonstrated a 14.0×8.9×7.4?cm soft tissue mass in the posterior [id number] thigh, isointense to muscle on T1, heterogeneously hyperintense on T2, with diffuse enhancement and scattered nonenhancing foci consistent with necrosis. The lesion abuts but does not invade the posterior femoral cortex, extends into subcut?tissues, and likely encases the profunda femoris vein. No suspicious LNs were seen; PET is pending. The mass displaces the sciatic n. and femoral vessels laterally.    On exam the pt ambulated without aid. There was no erythema or edema, hip/knee/ankle ROM was intact, but a firm posterior thigh mass was palpable approximately 19?cm cranial to the knee. Core needle biopsy confirmed high-grade sarcoma, consistent with undifferentiated pleomorphic sarcoma. The pt and   are here to discuss the role of radiotherapy.    SUBJECTIVE:  The patient is present today in clinic alone to review her pathology from her recent biopsy as well as her imaging. She did not know her cancer diagnosis, which we reviewed in detail during our conversation. The patient reports a two-month history of swelling in the left thigh. She initially saw her primary care physician, who ordered an ultrasound. Per the radiology report, she was advised to observe for 30 days for spontaneous regression; when it did not regress, she was referred to orthopedics and underwent further evaluation with surgery. Unfortunately, the biopsy revealed cancer. The patient is now here to discuss next steps. She reports mild discomfort in the left distal posterior thigh. She works a sedentary job as a dispatch controller. She feels like her AirMac shifts around when seated and can compress or move laterally, but she denies significant pain. She has no personal history of cancer or autoimmune conditions. Her family history is notable for a sister who  of cancer two years ago--possibly RCC--though the patient is unsure of the exact type. She does not have a pacemaker. She is anxious about her diagnosis and eager to move forward with treatment. She is able to perform all her usual ADLs and IADLs. She lives alone and is comfortable driving to and from appointments. She follows up with her primary care physician, who recommended a mammogram, but she has not yet scheduled it given her ongoing sarcoma-related issues. She has a history of an abnormal Pap smear in  but no history of cervical cancer.    Review of Systems: As per HPI; otherwise, a 10 system review is unremarkable    Past Medical History:   Diagnosis Date    Abnormal Pap smear of cervix     see problem list    CARDIOVASCULAR SCREENING; LDL GOAL LESS THAN 160 2010    Cervical high risk HPV (human papillomavirus) test positive 2017, 2019    see  problem list    Hx of colposcopy with cervical biopsy 2011    wnl    Hypertension goal BP (blood pressure) < 140/90 12/05/2011    Status post colposcopy 05/02/2017    ECC - negative for dysplasia    Undifferentiated pleomorphic sarcoma (H)     L posterior thigh        Past Surgical History:   Procedure Laterality Date    ARTHROSCOPY KNEE RT/LT  1988, 1994    left knee    s/p knee reconstructive surgery Left      Current Medications:    Current Outpatient Medications:     albuterol (PROAIR HFA/PROVENTIL HFA/VENTOLIN HFA) 108 (90 Base) MCG/ACT inhaler, Inhale 2 puffs into the lungs every 6 hours as needed for shortness of breath, wheezing or cough., Disp: 18 g, Rfl: 1    amLODIPine (NORVASC) 10 MG tablet, Take 1 tablet (10 mg) by mouth daily., Disp: 90 tablet, Rfl: 3    metoprolol succinate ER (TOPROL XL) 50 MG 24 hr tablet, Take 1 tablet (50 mg) by mouth daily., Disp: 90 tablet, Rfl: 3    Allergies:  Allergies   Allergen Reactions    Butalbital-Aspirin-Caffeine     Salicylates      ferenol     Social History:  Social History     Socioeconomic History    Marital status: Single   Tobacco Use    Smoking status: Never    Smokeless tobacco: Never    Tobacco comments:     smoke every blue moon   Vaping Use    Vaping status: Never Used   Substance and Sexual Activity    Alcohol use: Yes     Comment: very occ    Drug use: No    Sexual activity: Not Currently   Social History Narrative        Dairy/d 3 servings/d.     Caffeine 4-5 servings/d    Exercise 3-4 x week    Sunscreen used - No    Seatbelts used - Yes    Working smoke/CO detectors in the home - Yes    Guns stored in the home - No    Self Breast Exams - Yes    Self Testicular Exam - NA    Eye Exam up to date - No    Dental Exam up to date - No    Pap Smear up to date - No    Mammogram up to date - No    PSA up to date - NA    Dexa Scan up to date - No    Flex Sig / Colonoscopy up to date - No    Immunizations up to date - Yes    Abuse: Current or Past(Physical, Sexual  or Emotional)- No    Do you feel safe in your environment - Yes    Tristian Farley CMA                     Social Drivers of Health     Financial Resource Strain: Low Risk  (5/12/2025)    Financial Resource Strain     Within the past 12 months, have you or your family members you live with been unable to get utilities (heat, electricity) when it was really needed?: No   Food Insecurity: Low Risk  (5/12/2025)    Food Insecurity     Within the past 12 months, did you worry that your food would run out before you got money to buy more?: No     Within the past 12 months, did the food you bought just not last and you didn t have money to get more?: No   Transportation Needs: Low Risk  (5/12/2025)    Transportation Needs     Within the past 12 months, has lack of transportation kept you from medical appointments, getting your medicines, non-medical meetings or appointments, work, or from getting things that you need?: No   Physical Activity: Unknown (5/12/2025)    Exercise Vital Sign     Days of Exercise per Week: 5 days   Stress: No Stress Concern Present (5/12/2025)    [employer] - Occupational Stress Questionnaire     Feeling of Stress : Only a little   Social Connections: Unknown (5/12/2025)    Social Connection and Isolation Panel [NHANES]     Frequency of Social Gatherings with Friends and Family: Once a week   Interpersonal Safety: Low Risk  (3/31/2025)    Interpersonal Safety     Do you feel physically and emotionally safe where you currently live?: Yes     Within the past 12 months, have you been hit, slapped, kicked or otherwise physically hurt by someone?: No     Within the past 12 months, have you been humiliated or emotionally abused in other ways by your partner or ex-partner?: No   Housing Stability: Low Risk  (5/12/2025)    Housing Stability     Do you have housing? : Yes     Are you worried about losing your housing?: No        Family History   Problem Relation Age of Onset     ARVIND Father     Cerebrovascular Disease Father     Cancer Father     Hypertension Father     Diabetes Sister     Hypertension Sister     Cerebrovascular Disease Sister     Diabetes Sister     Hypertension Sister     Cancer Sister         possibly kidney    Diabetes Brother      Physical Exam:  KPS: 100  BP (!) 144/63 (BP Location: Right arm)   Pulse 76   Wt 93.1 kg (205 lb 3.2 oz)   LMP  (LMP Unknown)   SpO2 98%   BMI 37.53 kg/m  , Pain Score No Pain (0)/10  General: Alert and in no acute distress.  CV: Regular rate and rhythm.  Lungs: Clear to auscultation bilaterally.  Abdomen: Soft and nontender, positive bowel sounds.    Extremities:  Warm and well-perfused, no edema.    Neuro: Alert and oriented; grossly nonfocal. Normal speech.  Psych: Mood and affect are appropriate to given situation. Answers questions appropriately.  Extremity exam: Indurated palpable mass in the distal posterior thigh     Labs:  Lab Results   Component Value Date    WBC 7.5 01/15/2013    HGB 13.3 01/15/2013    HCT 39.2 01/15/2013     01/15/2013    CHOL 210 (H) 05/12/2025    ALT 28 01/12/2024    AST 25 01/12/2024     05/12/2025    BUN 16.4 05/12/2025    CO2 26 05/12/2025    TSH 0.69 06/06/2022    ALKPHOS 92 01/12/2024     Pathology:  Collected 5/23/2025 11:30 AM       Status: Final result       Dx: Soft tissue mass    Test Result Released: No [facility])    1 Result Note       View Follow-Up Encounter       Component  Resulting Agency   Case Report   Surgical Pathology Report                         Case: [id number]                                   Authorizing Provider:  Jose R Hammond,  Collected:           05/23/2025 11:30 AM                                  MD                                                                           Ordering Location:     [facility]          Received:            05/23/2025 11:46 AM                                  [facility]                                                                                     [place]                                                                   Pathologist:           Colten Zhou MD                                                                 Specimen:    Thigh, Left, 2 cores                                                                      Final Diagnosis   A. Soft tissue mass, left thigh, biopsy:   - High-grade sarcoma, consistent with undifferentiated pleomorphic sarcoma.        Imaging:          PET Scan scheduled for 5/30/2025    MRI Femur  Exam: MRI of the left thigh with contrast dated 5/19/2025.     COMPARISON: Ultrasound dated 5/5/2025.     CLINICAL HISTORY: Evaluate soft tissue mass.     TECHNIQUE: Multiplanar, multisequence MR imaging of the left thigh was  obtained using standard sequences in 3 orthogonal planes before and  after the uneventful administration of intravenous gadolinium  contrast.     Contrast: 9mL Gadavist.     FINDINGS:     There is a soft tissue mass within the mid to lower aspect of the left  thigh measuring 14.0 x 8.9 x 7.4 cm, coronal series image 24; sagittal  series image 29; axial series 72. The mass is fairly isointense to  muscle on T1, heterogeneous on T2 weighted images with diffuse  enhancement, with low scattered foci of low signal on the enhanced  images, presumed areas of necrosis. The mass abuts the posterior  aspect of the femoral cortex without definite involvement of the  cortical bone on MRI. Extension into the subcutaneous soft tissues,  for example axial image 68. The profunda femoris vein is likely  encased in the mass. The top aspect of the mass is located  approximately 19 cm cranial to the knee joint.     Scattered inguinal lymph nodes.     No marrow signal abnormalities to suggest fracture or marrow  infiltration.     Large field of view limits evaluation of the knee and hip joints  although there is osteoarthrosis in both knee joints with artifact in  the left  knee from prior surgery.     No significant joint effusion in either hip joint. Small left knee  joint.     Nonspecific left ovarian cyst measuring 2.3 x 2.2 cm.                                                                      IMPRESSION:  1. 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  Referral to orthopedic oncology at the [facility] for  definitive diagnosis with biopsy/histopathology.     2. No marrow signal changes to suggest fracture or marrow  infiltration.    Radiation Oncology Pre-Treatment Evaluation:  Pacemaker: denies  Prior radiation: denies  Pregnancy status: NA post menopausal    History of lupus, scleroderma, connective tissue disorders and collagen vascular disease: denies  Pain: 0/10  Pain Plan: NA  Intent of treatment: curative/palliative: curative, adjuvant  Side Effects of Radiation: We discussed in detail the management of treatment side effects and provided educational materials regarding the self-care of the most common side effects.    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    In this particular case, we have a patient with good performance status with a large high grade extremity UPS. Neoadjuvant systemic therapy can be considered for large, high-grade tumors, as per 2019 [employer] study, NACT improves RFS and OS for extremity tumors >=10 cm, and further, there is data from Will et al in 2024 (MD Sancho) that neoadjuvant ICB is associated with complex  immune changes within the tumor microenvironment in DDLPS and UPS and that neoadjuvant ICB with concurrent radiotherapy has significant efficacy in UPS. Thus, we will await the evaluation of sarcoma specialist medical oncologist regarding the role of neoadjuvant systemic therapy for this particular case.    From the radiotherapy perspective, we would strongly favor radiotherapy for optimizing local control. In the pre-operative setting, we would prescribe radiation to 50 Gy in 25 fractions, daily, Monday-Friday, approximately 15-30 minutes per day. We would use daily image guidance and IMRT in an effort to reduce incidence of late toxicities as per RTOG 0630 (Ochoa et al., JCO 2015). We would coordinate timing with Dr. Hammond regarding post-RT excision. If systemic therapy is recommended prior to radiation, we will require a new MRI closer to the start of radiation, for accurate target volume delineation. Otherwise, we can utilize the MR from 5/19/2025 if the team agrees with up front neoadjuvant RT with or without concurrent ICB.    We reviewed the natural history of this diagnosis and the role of radiation in the management of sarcomas. We had a discussion regarding the rationale of, logistics of, benefits of, alternatives to, and risks of radiation, both short and long term. We discussed that radiation therapy provides a local control benefit for soft tissue sarcoma but randomized data suggests that it does not provide an OS benefit (Montes et al., JCO 1998). We discussed pre-operative vs post-operative radiotherapy, and that pre-operative radiotherapy was associated with lower rates of acute skin erythema, late fibrosis (31 vs 48%), joint stiffness (18 vs. 23%), edema (15% vs. 23%), albeit none were statistically significant, but has higher rates of acute wound complications (35 vs. 17%) (O'Buck et al., Lancet 2002; Gerardo et al., Radiother Oncol 2005).      We discussed the rationale of radiation, risks (short  and long term as listed on consent), benefits, and alternatives.  The risks of radiation include but are not limited to: skin irritation which may be dry or moist desquamation, hair loss in the area treated, fibrosis, joint stiffness, edema, wound healing difficulties, sterility, increased risk of fracture, weakness of the extremity, and tumors caused by radiation.  We provided educational material regarding self care of the most common side effects.     Plan:  1) Consultation with medical oncology regarding the role for neoadjuvant chemotherapy or immunotherapy  2) Conventionally fractionated preoperative radiotherapy to 5000 cGy 25 fractions.  If neoadjuvant chemotherapy is given, radiotherapy would follow chemotherapy.  If concurrent immunotherapy is recommended, we will proceed with CT simulation  3) Simulation will be performed in supine, left leg straight, right leg frog-legged.     We appreciate the opportunity to participate in Ms. Llamas's care. She was encouraged to contact me with questions or concerns should they arise.    Shiv Mai MD MS PGY3    Physician Attestation   I, Araceli Huff, saw this patient and agree with the findings and plan of care as documented in the note.   I was present for key portions of the history and physical exam.  Briefly, Ms. Caty Llamas is a 65-year-old woman with a recently diagnosed undifferentiated pleomorphic sarcoma centered at the distal lateral aspect of the left thigh, cT3 (14 cm, abutting the posterior aspect of the femoral cortex without definite involvement of cortical bone, encasing profunda femoris vein), cN0, cM0(PET/CT with mildly enlarged left axillary node that are favored to be reactive), status post biopsy.  She is seen in consultation regarding the role for neoadjuvant radiotherapy.  As per above, she will need to see medical oncology regarding the role of neoadjuvant chemotherapy.  An urgent order for this consultation has been placed, and I  have messaged our medical oncology colleagues about the case.  Once the role and timing of systemic therapy is determined, we will schedule her for CT simulation for radiation planning accordingly.  As delineated above, we would recommend conventionally fractionated radiotherapy using IG-IMRT to a total dose of 5000 cGy in 25 fractions.    We appreciate the opportunity to participate in Ms. Llamas's care. She was encouraged to contact me with questions or concerns should they arise.    I personally reviewed the available MRI and PET/CT images. Laboratory data and pathology as noted above was reviewed. I reviewed previous medical records, which are summarized in the HPI. A total of 60 minutes were spent (including face to face time) during this visit.     Araceli Huff MD MPH PhD    Department of Radiation Oncology

## 2025-05-29 NOTE — TELEPHONE ENCOUNTER
Left Voicemail (1st Attempt) for the patient to call back and schedule whole body PET scan  ordered for patient today 5/29

## 2025-06-03 ENCOUNTER — OFFICE VISIT (OUTPATIENT)
Dept: RADIATION ONCOLOGY | Facility: CLINIC | Age: 65
End: 2025-06-03
Attending: ORTHOPAEDIC SURGERY
Payer: COMMERCIAL

## 2025-06-03 ENCOUNTER — PRE VISIT (OUTPATIENT)
Dept: RADIATION ONCOLOGY | Facility: CLINIC | Age: 65
End: 2025-06-03
Payer: COMMERCIAL

## 2025-06-03 VITALS
WEIGHT: 205.2 LBS | HEART RATE: 76 BPM | SYSTOLIC BLOOD PRESSURE: 144 MMHG | BODY MASS INDEX: 37.53 KG/M2 | DIASTOLIC BLOOD PRESSURE: 63 MMHG | OXYGEN SATURATION: 98 %

## 2025-06-03 DIAGNOSIS — C49.9 UNDIFFERENTIATED PLEOMORPHIC SARCOMA (H): ICD-10-CM

## 2025-06-03 PROCEDURE — 3077F SYST BP >= 140 MM HG: CPT | Performed by: RADIOLOGY

## 2025-06-03 PROCEDURE — 99205 OFFICE O/P NEW HI 60 MIN: CPT | Mod: GC | Performed by: RADIOLOGY

## 2025-06-03 PROCEDURE — 3078F DIAST BP <80 MM HG: CPT | Performed by: RADIOLOGY

## 2025-06-03 PROCEDURE — 99214 OFFICE O/P EST MOD 30 MIN: CPT | Performed by: RADIOLOGY

## 2025-06-03 PROCEDURE — 1126F AMNT PAIN NOTED NONE PRSNT: CPT | Performed by: RADIOLOGY

## 2025-06-03 ASSESSMENT — PAIN SCALES - GENERAL: PAINLEVEL_OUTOF10: NO PAIN (0)

## 2025-06-03 NOTE — LETTER
6/3/2025      Caty Llamas  [address]  [place] [address]      Dear Colleague,    Thank you for referring your patient, Caty Llamas, to the [facility] RADIATION ONCOLOGY. Please see a copy of my visit note below.    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  She is referred by Dr. Hammond for consultation to discuss the potential role for radiotherapy.    Past radiation oncologist: None  Medical oncologist: None, heme onc referral has been placed  Orthopedic/surgical oncologist: Dr. 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 and femoral vessels.    The pt noted new swelling of the lateral left thigh recently; the fullness has persisted with intermittent discomfort but no antecedent trauma. Referring MDs observed for four weeks before obtaining an MRI.    MRI of the left femur demonstrated a 14.0×8.9×7.4?cm soft tissue mass in the posterior fjy-mi-awnucj thigh, isointense to muscle on T1, heterogeneously hyperintense on T2, with diffuse enhancement and scattered nonenhancing foci consistent with necrosis. The lesion abuts but does not invade the posterior femoral cortex, extends into subcut?tissues, and likely encases the profunda femoris vein. No suspicious LNs were seen; PET is pending. The mass displaces the sciatic n. and femoral vessels laterally.    On exam the pt ambulated without aid. There was no  erythema or edema, hip/knee/ankle ROM was intact, but a firm posterior thigh mass was palpable approximately 19?cm cranial to the knee. Core needle biopsy confirmed high-grade sarcoma, consistent with undifferentiated pleomorphic sarcoma. The pt and  are here to discuss the role of radiotherapy.    SUBJECTIVE:  The patient is present today in clinic alone to review her pathology from her recent biopsy as well as her imaging. She did not know her cancer diagnosis, which we reviewed in detail during our conversation. The patient reports a two-month history of swelling in the left thigh. She initially saw her primary care physician, who ordered an ultrasound. Per the radiology report, she was advised to observe for 30 days for spontaneous regression; when it did not regress, she was referred to orthopedics and underwent further evaluation with surgery. Unfortunately, the biopsy revealed cancer. The patient is now here to discuss next steps. She reports mild discomfort in the left distal posterior thigh. She works a sedentary job as a dispatch controller. She feels like her AirMac shifts around when seated and can compress or move laterally, but she denies significant pain. She has no personal history of cancer or autoimmune conditions. Her family history is notable for a sister who  of cancer two years ago--possibly RCC--though the patient is unsure of the exact type. She does not have a pacemaker. She is anxious about her diagnosis and eager to move forward with treatment. She is able to perform all her usual ADLs and IADLs. She lives alone and is comfortable driving to and from appointments. She follows up with her primary care physician, who recommended a mammogram, but she has not yet scheduled it given her ongoing sarcoma-related issues. She has a history of an abnormal Pap smear in  but no history of cervical cancer.    Review of Systems: As per HPI; otherwise, a 10 system review is  unremarkable    Past Medical History:   Diagnosis Date     Abnormal Pap smear of cervix 2011    see problem list     CARDIOVASCULAR SCREENING; LDL GOAL LESS THAN 160 05/09/2010     Cervical high risk HPV (human papillomavirus) test positive 03/27/2017, 2019    see problem list     Hx of colposcopy with cervical biopsy 2011    wnl     Hypertension goal BP (blood pressure) < 140/90 12/05/2011     Status post colposcopy 05/02/2017    ECC - negative for dysplasia     Undifferentiated pleomorphic sarcoma (H)     L posterior thigh        Past Surgical History:   Procedure Laterality Date     ARTHROSCOPY KNEE RT/LT  1988, 1994    left knee     s/p knee reconstructive surgery Left      Current Medications:    Current Outpatient Medications:      albuterol (PROAIR HFA/PROVENTIL HFA/VENTOLIN HFA) 108 (90 Base) MCG/ACT inhaler, Inhale 2 puffs into the lungs every 6 hours as needed for shortness of breath, wheezing or cough., Disp: 18 g, Rfl: 1     amLODIPine (NORVASC) 10 MG tablet, Take 1 tablet (10 mg) by mouth daily., Disp: 90 tablet, Rfl: 3     metoprolol succinate ER (TOPROL XL) 50 MG 24 hr tablet, Take 1 tablet (50 mg) by mouth daily., Disp: 90 tablet, Rfl: 3    Allergies:  Allergies   Allergen Reactions     Butalbital-Aspirin-Caffeine      Salicylates      ferenol     Social History:  Social History     Socioeconomic History     Marital status: Single   Tobacco Use     Smoking status: Never     Smokeless tobacco: Never     Tobacco comments:     smoke every blue moon   Vaping Use     Vaping status: Never Used   Substance and Sexual Activity     Alcohol use: Yes     Comment: very occ     Drug use: No     Sexual activity: Not Currently   Social History Narrative        Dairy/d 3 servings/d.     Caffeine 4-5 servings/d    Exercise 3-4 x week    Sunscreen used - No    Seatbelts used - Yes    Working smoke/CO detectors in the home - Yes    Guns stored in the home - No    Self Breast Exams - Yes    Self Testicular Exam - NA     Eye Exam up to date - No    Dental Exam up to date - No    Pap Smear up to date - No    Mammogram up to date - No    PSA up to date - NA    Dexa Scan up to date - No    Flex Sig / Colonoscopy up to date - No    Immunizations up to date - Yes    Abuse: Current or Past(Physical, Sexual or Emotional)- No    Do you feel safe in your environment - Yes    Tristian Farley CMA                     Social Drivers of Health     Financial Resource Strain: Low Risk  (5/12/2025)    Financial Resource Strain      Within the past 12 months, have you or your family members you live with been unable to get utilities (heat, electricity) when it was really needed?: No   Food Insecurity: Low Risk  (5/12/2025)    Food Insecurity      Within the past 12 months, did you worry that your food would run out before you got money to buy more?: No      Within the past 12 months, did the food you bought just not last and you didn t have money to get more?: No   Transportation Needs: Low Risk  (5/12/2025)    Transportation Needs      Within the past 12 months, has lack of transportation kept you from medical appointments, getting your medicines, non-medical meetings or appointments, work, or from getting things that you need?: No   Physical Activity: Unknown (5/12/2025)    Exercise Vital Sign      Days of Exercise per Week: 5 days   Stress: No Stress Concern Present (5/12/2025)    [employer] [place] [employer] - Occupational Stress Questionnaire      Feeling of Stress : Only a little   Social Connections: Unknown (5/12/2025)    Social Connection and Isolation Panel [NHANES]      Frequency of Social Gatherings with Friends and Family: Once a week   Interpersonal Safety: Low Risk  (3/31/2025)    Interpersonal Safety      Do you feel physically and emotionally safe where you currently live?: Yes      Within the past 12 months, have you been hit, slapped, kicked or otherwise physically hurt by someone?: No      Within the past 12 months, have you  been humiliated or emotionally abused in other ways by your partner or ex-partner?: No   Housing Stability: Low Risk  (5/12/2025)    Housing Stability      Do you have housing? : Yes      Are you worried about losing your housing?: No        Family History   Problem Relation Age of Onset     C.A.D. Father      Cerebrovascular Disease Father      Cancer Father      Hypertension Father      Diabetes Sister      Hypertension Sister      Cerebrovascular Disease Sister      Diabetes Sister      Hypertension Sister      Cancer Sister         possibly kidney     Diabetes Brother      Physical Exam:  KPS: 100  BP (!) 144/63 (BP Location: Right arm)   Pulse 76   Wt 93.1 kg (205 lb 3.2 oz)   LMP  (LMP Unknown)   SpO2 98%   BMI 37.53 kg/m  , Pain Score No Pain (0)/10  General: Alert and in no acute distress.  CV: Regular rate and rhythm.  Lungs: Clear to auscultation bilaterally.  Abdomen: Soft and nontender, positive bowel sounds.    Extremities:  Warm and well-perfused, no edema.    Neuro: Alert and oriented; grossly nonfocal. Normal speech.  Psych: Mood and affect are appropriate to given situation. Answers questions appropriately.  Extremity exam: Indurated palpable mass in the distal posterior thigh     Labs:  Lab Results   Component Value Date    WBC 7.5 01/15/2013    HGB 13.3 01/15/2013    HCT 39.2 01/15/2013     01/15/2013    CHOL 210 (H) 05/12/2025    ALT 28 01/12/2024    AST 25 01/12/2024     05/12/2025    BUN 16.4 05/12/2025    CO2 26 05/12/2025    TSH 0.69 06/06/2022    ALKPHOS 92 01/12/2024     Pathology:  Collected 5/23/2025 11:30 AM       Status: Final result       Dx: Soft tissue mass    Test Result Released: No (inaccessible in [facility])    1 Result Note       View Follow-Up Encounter       Component  Resulting Agency   Case Report   Surgical Pathology Report                         Case: [id number]                                   Authorizing Provider:  Jose R Hammond,  Collected:            05/23/2025 11:30 AM                                  MD                                                                           Ordering Location:     [facility]          Received:            05/23/2025 11:46 AM                                  [facility]                                                                                    [place]                                                                   Pathologist:           Colten Zhou MD                                                                 Specimen:    Thigh, Left, 2 cores                                                                      Final Diagnosis   A. Soft tissue mass, left thigh, biopsy:   - High-grade sarcoma, consistent with undifferentiated pleomorphic sarcoma.        Imaging:          PET Scan scheduled for 5/30/2025    MRI Femur  Exam: MRI of the left thigh with contrast dated 5/19/2025.     COMPARISON: Ultrasound dated 5/5/2025.     CLINICAL HISTORY: Evaluate soft tissue mass.     TECHNIQUE: Multiplanar, multisequence MR imaging of the left thigh was  obtained using standard sequences in 3 orthogonal planes before and  after the uneventful administration of intravenous gadolinium  contrast.     Contrast: 9mL Gadavist.     FINDINGS:     There is a soft tissue mass within the mid to lower aspect of the left  thigh measuring 14.0 x 8.9 x 7.4 cm, coronal series image 24; sagittal  series image 29; axial series 72. The mass is fairly isointense to  muscle on T1, heterogeneous on T2 weighted images with diffuse  enhancement, with low scattered foci of low signal on the enhanced  images, presumed areas of necrosis. The mass abuts the posterior  aspect of the femoral cortex without definite involvement of the  cortical bone on MRI. Extension into the subcutaneous soft tissues,  for example axial image 68. The profunda femoris vein is likely  encased in the mass. The top aspect of the mass is  located  approximately 19 cm cranial to the knee joint.     Scattered inguinal lymph nodes.     No marrow signal abnormalities to suggest fracture or marrow  infiltration.     Large field of view limits evaluation of the knee and hip joints  although there is osteoarthrosis in both knee joints with artifact in  the left knee from prior surgery.     No significant joint effusion in either hip joint. Small left knee  joint.     Nonspecific left ovarian cyst measuring 2.3 x 2.2 cm.                                                                      IMPRESSION:  1. 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  Referral to orthopedic oncology at the [facility] for  definitive diagnosis with biopsy/histopathology.     2. No marrow signal changes to suggest fracture or marrow  infiltration.    Radiation Oncology Pre-Treatment Evaluation:  Pacemaker: denies  Prior radiation: denies  Pregnancy status: NA post menopausal    History of lupus, scleroderma, connective tissue disorders and collagen vascular disease: denies  Pain: 0/10  Pain Plan: NA  Intent of treatment: curative/palliative: curative, adjuvant  Side Effects of Radiation: We discussed in detail the management of treatment side effects and provided educational materials regarding the self-care of the most common side effects.    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    In this particular case, we have a patient with good performance status with a  large high grade extremity UPS. Neoadjuvant systemic therapy can be considered for large, high-grade tumors, as per 2019 [employer] study, NACT improves RFS and OS for extremity tumors >=10 cm, and further, there is data from Will et al in 2024 ([facility]) that neoadjuvant ICB is associated with complex immune changes within the tumor microenvironment in DDLPS and UPS and that neoadjuvant ICB with concurrent radiotherapy has significant efficacy in UPS. Thus, we will await the evaluation of sarcoma specialist medical oncologist regarding the role of neoadjuvant systemic therapy for this particular case.    From the radiotherapy perspective, we would strongly favor radiotherapy for optimizing local control. In the pre-operative setting, we would prescribe radiation to 50 Gy in 25 fractions, daily, Monday-Friday, approximately 15-30 minutes per day. We would use daily image guidance and IMRT in an effort to reduce incidence of late toxicities as per RTOG 0630 (Ochoa et al., JCO 2015). We would coordinate timing with Dr. Hammond regarding post-RT excision. If systemic therapy is recommended prior to radiation, we will require a new MRI closer to the start of radiation, for accurate target volume delineation. Otherwise, we can utilize the MR from 5/19/2025 if the team agrees with up front neoadjuvant RT with or without concurrent ICB.    We reviewed the natural history of this diagnosis and the role of radiation in the management of sarcomas. We had a discussion regarding the rationale of, logistics of, benefits of, alternatives to, and risks of radiation, both short and long term. We discussed that radiation therapy provides a local control benefit for soft tissue sarcoma but randomized data suggests that it does not provide an OS benefit (Montes et al., JCO 1998). We discussed pre-operative vs post-operative radiotherapy, and that pre-operative radiotherapy was associated with lower rates of acute skin  erythema, late fibrosis (31 vs 48%), joint stiffness (18 vs. 23%), edema (15% vs. 23%), albeit none were statistically significant, but has higher rates of acute wound complications (35 vs. 17%) (O'Buck et al., Lancet 2002; Gerardo et al., Radiother Oncol 2005).      We discussed the rationale of radiation, risks (short and long term as listed on consent), benefits, and alternatives.  The risks of radiation include but are not limited to: skin irritation which may be dry or moist desquamation, hair loss in the area treated, fibrosis, joint stiffness, edema, wound healing difficulties, sterility, increased risk of fracture, weakness of the extremity, and tumors caused by radiation.  We provided educational material regarding self care of the most common side effects.     Plan:  1) Consultation with medical oncology regarding the role for neoadjuvant chemotherapy or immunotherapy  2) Conventionally fractionated preoperative radiotherapy to 5000 cGy 25 fractions.  If neoadjuvant chemotherapy is given, radiotherapy would follow chemotherapy.  If concurrent immunotherapy is recommended, we will proceed with CT simulation  3) Simulation will be performed in supine, left leg straight, right leg frog-legged.     We appreciate the opportunity to participate in Ms. Llamas's care. She was encouraged to contact me with questions or concerns should they arise.    Shiv Mai MD MS PGY3    Physician Attestation   I, Araceli Huff, saw this patient and agree with the findings and plan of care as documented in the note.   I was present for key portions of the history and physical exam.  Briefly, Ms. Caty Llamas is a 65-year-old woman with a recently diagnosed undifferentiated pleomorphic sarcoma centered at the distal lateral aspect of the left thigh, cT3 (14 cm, abutting the posterior aspect of the femoral cortex without definite involvement of cortical bone, encasing profunda femoris vein), cN0, cM0(PET/CT with mildly  "enlarged left axillary node that are favored to be reactive), status post biopsy.  She is seen in consultation regarding the role for neoadjuvant radiotherapy.  As per above, she will need to see medical oncology regarding the role of neoadjuvant chemotherapy.  An urgent order for this consultation has been placed, and I have messaged our medical oncology colleagues about the case.  Once the role and timing of systemic therapy is determined, we will schedule her for CT simulation for radiation planning accordingly.  As delineated above, we would recommend conventionally fractionated radiotherapy using IG-IMRT to a total dose of 5000 cGy in 25 fractions.    We appreciate the opportunity to participate in Ms. Llamas's care. She was encouraged to contact me with questions or concerns should they arise.    I personally reviewed the available MRI and PET/CT images. Laboratory data and pathology as noted above was reviewed. I reviewed previous medical records, which are summarized in the HPI. A total of 60 minutes were spent (including face to face time) during this visit.     Araceli Huff MD MPH PhD    Department of Radiation Oncology          Oncology Rooming Note    Amita 3, 2025 1:56 PM   Caty Llamas is a 65 year old female who presents for:    Chief Complaint   Patient presents with     Cancer     Consult   Undifferentiated pleomorphic sarcoma of the posterior L thigh    Initial Vitals: BP (!) 144/63 (BP Location: Right arm)   Pulse 76   Wt 93.1 kg (205 lb 3.2 oz)   LMP  (LMP Unknown)   SpO2 98%   BMI 37.53 kg/m   Estimated body mass index is 37.53 kg/m  as calculated from the following:    Height as of 5/23/25: 1.575 m (5' 2\").    Weight as of this encounter: 93.1 kg (205 lb 3.2 oz). Body surface area is 2.02 meters squared.  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 Patient is postmenopausal.  Allergies " "reviewed: Yes  Medications reviewed: Yes    Medications: Medication refills not needed today.  Pharmacy name entered into [employer]: [id number] [place] [facility] [address] [facility]    Frailty Screening:   Is the patient here for a new oncology consult visit in cancer care? 2. 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 After elevating her leg for 10 minutes at home the mass will return to the size of 1/2 a lemon.\"  Pt also states while at work the pain at the site of the mass is 7/10 for severity, whereas when not working she denies pain.  This RN refrained from discussing Pt's diagnosis as it was discovered she needed more information regarding her biopsy results from 5/23/25. Dr. Mai and Dr. Huff was notified.      Rozina Dooley, RN              Again, thank you for allowing me to participate in the care of your patient.        Sincerely,        Araceli Huff    Electronically signed"

## 2025-06-03 NOTE — PROGRESS NOTES
"Oncology Rooming Note    Amita 3, 2025 1:56 PM   Caty Llamas is a 65 year old female who presents for:    Chief Complaint   Patient presents with    Cancer     Consult   Undifferentiated pleomorphic sarcoma of the posterior L thigh    Initial Vitals: BP (!) 144/63 (BP Location: Right arm)   Pulse 76   Wt 93.1 kg (205 lb 3.2 oz)   LMP  (LMP Unknown)   SpO2 98%   BMI 37.53 kg/m   Estimated body mass index is 37.53 kg/m  as calculated from the following:    Height as of 5/23/25: 1.575 m (5' 2\").    Weight as of this encounter: 93.1 kg (205 lb 3.2 oz). Body surface area is 2.02 meters squared.  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 Patient is postmenopausal.  Allergies reviewed: Yes  Medications reviewed: Yes    Medications: Medication refills not needed today.  Pharmacy name entered into [employer]: [id number] [address] [facility]    Frailty Screening:   Is the patient here for a new oncology consult visit in cancer care? 2. 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 After elevating her leg for 10 minutes at home the mass will return to the size of 1/2 a lemon.\"  Pt also states while at work the pain at the site of the mass is 7/10 for severity, whereas when not working she denies pain.  This RN refrained from discussing Pt's diagnosis as it was discovered she needed more information regarding her biopsy results from 5/23/25. Dr. Mai and Dr. Huff was notified.      Rozina Dooley RN            "

## 2025-06-03 NOTE — LETTER
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  She is referred by Dr. Hammond for consultation to discuss the potential role for radiotherapy.    Past radiation oncologist: None  Medical oncologist: None, heme onc referral has been placed  Orthopedic/surgical oncologist: Dr. 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 and femoral vessels.    The pt noted new swelling of the lateral left thigh recently; the fullness has persisted with intermittent discomfort but no antecedent trauma. Referring MDs observed for four weeks before obtaining an MRI.    MRI of the left femur demonstrated a 14.0×8.9×7.4?cm soft tissue mass in the posterior [id number] thigh, isointense to muscle on T1, heterogeneously hyperintense on T2, with diffuse enhancement and scattered nonenhancing foci consistent with necrosis. The lesion abuts but does not invade the posterior femoral cortex, extends into subcut?tissues, and likely encases the profunda femoris vein. No suspicious LNs were seen; PET is pending. The mass displaces the sciatic n. and femoral vessels laterally.    On exam the pt ambulated without aid. There was no erythema or edema, hip/knee/ankle ROM was intact, but a firm posterior thigh mass was palpable approximately 19?cm cranial to the knee. Core needle biopsy  confirmed high-grade sarcoma, consistent with undifferentiated pleomorphic sarcoma. The pt and  are here to discuss the role of radiotherapy.    SUBJECTIVE:  The patient is present today in clinic alone to review her pathology from her recent biopsy as well as her imaging. She did not know her cancer diagnosis, which we reviewed in detail during our conversation. The patient reports a two-month history of swelling in the left thigh. She initially saw her primary care physician, who ordered an ultrasound. Per the radiology report, she was advised to observe for 30 days for spontaneous regression; when it did not regress, she was referred to orthopedics and underwent further evaluation with surgery. Unfortunately, the biopsy revealed cancer. The patient is now here to discuss next steps. She reports mild discomfort in the left distal posterior thigh. She works a sedentary job as a dispatch controller. She feels like her AirMac shifts around when seated and can compress or move laterally, but she denies significant pain. She has no personal history of cancer or autoimmune conditions. Her family history is notable for a sister who  of cancer two years ago--possibly RCC--though the patient is unsure of the exact type. She does not have a pacemaker. She is anxious about her diagnosis and eager to move forward with treatment. She is able to perform all her usual ADLs and IADLs. She lives alone and is comfortable driving to and from appointments. She follows up with her primary care physician, who recommended a mammogram, but she has not yet scheduled it given her ongoing sarcoma-related issues. She has a history of an abnormal Pap smear in  but no history of cervical cancer.    Review of Systems: As per HPI; otherwise, a 10 system review is unremarkable    Past Medical History:   Diagnosis Date     Abnormal Pap smear of cervix     see problem list     CARDIOVASCULAR SCREENING; LDL GOAL LESS THAN 160  05/09/2010     Cervical high risk HPV (human papillomavirus) test positive 03/27/2017, 2019    see problem list     Hx of colposcopy with cervical biopsy 2011    wnl     Hypertension goal BP (blood pressure) < 140/90 12/05/2011     Status post colposcopy 05/02/2017    ECC - negative for dysplasia     Undifferentiated pleomorphic sarcoma (H)     L posterior thigh        Past Surgical History:   Procedure Laterality Date     ARTHROSCOPY KNEE RT/LT  1988, 1994    left knee     s/p knee reconstructive surgery Left      Current Medications:    Current Outpatient Medications:      albuterol (PROAIR HFA/PROVENTIL HFA/VENTOLIN HFA) 108 (90 Base) MCG/ACT inhaler, Inhale 2 puffs into the lungs every 6 hours as needed for shortness of breath, wheezing or cough., Disp: 18 g, Rfl: 1     amLODIPine (NORVASC) 10 MG tablet, Take 1 tablet (10 mg) by mouth daily., Disp: 90 tablet, Rfl: 3     metoprolol succinate ER (TOPROL XL) 50 MG 24 hr tablet, Take 1 tablet (50 mg) by mouth daily., Disp: 90 tablet, Rfl: 3    Allergies:  Allergies   Allergen Reactions     Butalbital-Aspirin-Caffeine      Salicylates      ferenol     Social History:  Social History     Socioeconomic History     Marital status: Single   Tobacco Use     Smoking status: Never     Smokeless tobacco: Never     Tobacco comments:     smoke every blue moon   Vaping Use     Vaping status: Never Used   Substance and Sexual Activity     Alcohol use: Yes     Comment: very occ     Drug use: No     Sexual activity: Not Currently   Social History Narrative        Dairy/d 3 servings/d.     Caffeine 4-5 servings/d    Exercise 3-4 x week    Sunscreen used - No    Seatbelts used - Yes    Working smoke/CO detectors in the home - Yes    Guns stored in the home - No    Self Breast Exams - Yes    Self Testicular Exam - NA    Eye Exam up to date - No    Dental Exam up to date - No    Pap Smear up to date - No    Mammogram up to date - No    PSA up to date - NA    Dexa Scan up to date - No     Flex Sig / Colonoscopy up to date - No    Immunizations up to date - Yes    Abuse: Current or Past(Physical, Sexual or Emotional)- No    Do you feel safe in your environment - Yes    Tristian Farley CMA                     Social Drivers of Health     Financial Resource Strain: Low Risk  (5/12/2025)    Financial Resource Strain      Within the past 12 months, have you or your family members you live with been unable to get utilities (heat, electricity) when it was really needed?: No   Food Insecurity: Low Risk  (5/12/2025)    Food Insecurity      Within the past 12 months, did you worry that your food would run out before you got money to buy more?: No      Within the past 12 months, did the food you bought just not last and you didn t have money to get more?: No   Transportation Needs: Low Risk  (5/12/2025)    Transportation Needs      Within the past 12 months, has lack of transportation kept you from medical appointments, getting your medicines, non-medical meetings or appointments, work, or from getting things that you need?: No   Physical Activity: Unknown (5/12/2025)    Exercise Vital Sign      Days of Exercise per Week: 5 days   Stress: No Stress Concern Present (5/12/2025)    [employer] [place] - Occupational Stress Questionnaire      Feeling of Stress : Only a little   Social Connections: Unknown (5/12/2025)    Social Connection and Isolation Panel [NHANES]      Frequency of Social Gatherings with Friends and Family: Once a week   Interpersonal Safety: Low Risk  (3/31/2025)    Interpersonal Safety      Do you feel physically and emotionally safe where you currently live?: Yes      Within the past 12 months, have you been hit, slapped, kicked or otherwise physically hurt by someone?: No      Within the past 12 months, have you been humiliated or emotionally abused in other ways by your partner or ex-partner?: No   Housing Stability: Low Risk  (5/12/2025)    Housing Stability      Do you  have housing? : Yes      Are you worried about losing your housing?: No        Family History   Problem Relation Age of Onset     C.A.D. Father      Cerebrovascular Disease Father      Cancer Father      Hypertension Father      Diabetes Sister      Hypertension Sister      Cerebrovascular Disease Sister      Diabetes Sister      Hypertension Sister      Cancer Sister         possibly kidney     Diabetes Brother      Physical Exam:  KPS: 100  BP (!) 144/63 (BP Location: Right arm)   Pulse 76   Wt 93.1 kg (205 lb 3.2 oz)   LMP  (LMP Unknown)   SpO2 98%   BMI 37.53 kg/m  , Pain Score No Pain (0)/10  General: Alert and in no acute distress.  CV: Regular rate and rhythm.  Lungs: Clear to auscultation bilaterally.  Abdomen: Soft and nontender, positive bowel sounds.    Extremities:  Warm and well-perfused, no edema.    Neuro: Alert and oriented; grossly nonfocal. Normal speech.  Psych: Mood and affect are appropriate to given situation. Answers questions appropriately.  Extremity exam: Indurated palpable mass in the distal posterior thigh     Labs:  Lab Results   Component Value Date    WBC 7.5 01/15/2013    HGB 13.3 01/15/2013    HCT 39.2 01/15/2013     01/15/2013    CHOL 210 (H) 05/12/2025    ALT 28 01/12/2024    AST 25 01/12/2024     05/12/2025    BUN 16.4 05/12/2025    CO2 26 05/12/2025    TSH 0.69 06/06/2022    ALKPHOS 92 01/12/2024     Pathology:  Collected 5/23/2025 11:30 AM       Status: Final result       Dx: Soft tissue mass    Test Result Released: No ([facility])    1 Result Note       View Follow-Up Encounter       Component  Resulting Agency   Case Report   Surgical Pathology Report                         Case: [id number]                                   Authorizing Provider:  Jose R Hammond,  Collected:           05/23/2025 11:30 AM                                  MD                                                                           Ordering Location:      [facility]          Received:            05/23/2025 11:46 AM                                  Orthopedic Clinic                                                                                    [place]                                                                   Pathologist:           Colten Zhou MD                                                                 Specimen:    Thigh, Left, 2 cores                                                                      Final Diagnosis   A. Soft tissue mass, left thigh, biopsy:   - High-grade sarcoma, consistent with undifferentiated pleomorphic sarcoma.        Imaging:          PET Scan scheduled for 5/30/2025    MRI Femur  Exam: MRI of the left thigh with contrast dated 5/19/2025.     COMPARISON: Ultrasound dated 5/5/2025.     CLINICAL HISTORY: Evaluate soft tissue mass.     TECHNIQUE: Multiplanar, multisequence MR imaging of the left thigh was  obtained using standard sequences in 3 orthogonal planes before and  after the uneventful administration of intravenous gadolinium  contrast.     Contrast: 9mL Gadavist.     FINDINGS:     There is a soft tissue mass within the mid to lower aspect of the left  thigh measuring 14.0 x 8.9 x 7.4 cm, coronal series image 24; sagittal  series image 29; axial series 72. The mass is fairly isointense to  muscle on T1, heterogeneous on T2 weighted images with diffuse  enhancement, with low scattered foci of low signal on the enhanced  images, presumed areas of necrosis. The mass abuts the posterior  aspect of the femoral cortex without definite involvement of the  cortical bone on MRI. Extension into the subcutaneous soft tissues,  for example axial image 68. The profunda femoris vein is likely  encased in the mass. The top aspect of the mass is located  approximately 19 cm cranial to the knee joint.     Scattered inguinal lymph nodes.     No marrow signal abnormalities to suggest fracture or marrow  infiltration.      Large field of view limits evaluation of the knee and hip joints  although there is osteoarthrosis in both knee joints with artifact in  the left knee from prior surgery.     No significant joint effusion in either hip joint. Small left knee  joint.     Nonspecific left ovarian cyst measuring 2.3 x 2.2 cm.                                                                      IMPRESSION:  1. 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  Referral to orthopedic oncology at the [facility] for  definitive diagnosis with biopsy/histopathology.     2. No marrow signal changes to suggest fracture or marrow  infiltration.    Radiation Oncology Pre-Treatment Evaluation:  Pacemaker: denies  Prior radiation: denies  Pregnancy status: NA post menopausal    History of lupus, scleroderma, connective tissue disorders and collagen vascular disease: denies  Pain: 0/10  Pain Plan: NA  Intent of treatment: curative/palliative: curative, adjuvant  Side Effects of Radiation: We discussed in detail the management of treatment side effects and provided educational materials regarding the self-care of the most common side effects.    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    In this particular case, we have a patient with good performance status with a large high grade extremity UPS. Neoadjuvant systemic therapy can be considered for large, high-grade tumors, as per 2019 [employer] study, NACT improves RFS and OS  for extremity tumors >=10 cm, and further, there is data from Will et al in 2024 ([facility]) that neoadjuvant ICB is associated with complex immune changes within the tumor microenvironment in DDLPS and UPS and that neoadjuvant ICB with concurrent radiotherapy has significant efficacy in UPS. Thus, we will await the evaluation of sarcoma specialist medical oncologist regarding the role of neoadjuvant systemic therapy for this particular case.    From the radiotherapy perspective, we would strongly favor radiotherapy for optimizing local control. In the pre-operative setting, we would prescribe radiation to 50 Gy in 25 fractions, daily, Monday-Friday, approximately 15-30 minutes per day. We would use daily image guidance and IMRT in an effort to reduce incidence of late toxicities as per RTOG 0630 (Ochoa et al., JCO 2015). We would coordinate timing with Dr. Hammond regarding post-RT excision. If systemic therapy is recommended prior to radiation, we will require a new MRI closer to the start of radiation, for accurate target volume delineation. Otherwise, we can utilize the MR from 5/19/2025 if the team agrees with up front neoadjuvant RT with or without concurrent ICB.    We reviewed the natural history of this diagnosis and the role of radiation in the management of sarcomas. We had a discussion regarding the rationale of, logistics of, benefits of, alternatives to, and risks of radiation, both short and long term. We discussed that radiation therapy provides a local control benefit for soft tissue sarcoma but randomized data suggests that it does not provide an OS benefit (Montes et al., JCO 1998). We discussed pre-operative vs post-operative radiotherapy, and that pre-operative radiotherapy was associated with lower rates of acute skin erythema, late fibrosis (31 vs 48%), joint stiffness (18 vs. 23%), edema (15% vs. 23%), albeit none were statistically significant, but has higher rates of acute wound  complications (35 vs. 17%) (O'Buck et al., Lancet 2002; Gerardo et al., Radiother Oncol 2005).      We discussed the rationale of radiation, risks (short and long term as listed on consent), benefits, and alternatives.  The risks of radiation include but are not limited to: skin irritation which may be dry or moist desquamation, hair loss in the area treated, fibrosis, joint stiffness, edema, wound healing difficulties, sterility, increased risk of fracture, weakness of the extremity, and tumors caused by radiation.  We provided educational material regarding self care of the most common side effects.     Plan:  1) Consultation with medical oncology regarding the role for neoadjuvant chemotherapy or immunotherapy  2) Conventionally fractionated preoperative radiotherapy to 5000 cGy 25 fractions.  If neoadjuvant chemotherapy is given, radiotherapy would follow chemotherapy.  If concurrent immunotherapy is recommended, we will proceed with CT simulation  3) Simulation will be performed in supine, left leg straight, right leg frog-legged.     We appreciate the opportunity to participate in Ms. Llamas's care. She was encouraged to contact me with questions or concerns should they arise.    Shiv Mai MD MS PGY3    Physician Attestation   I, Araceli Huff, saw this patient and agree with the findings and plan of care as documented in the note.   I was present for key portions of the history and physical exam.  Briefly, Ms. Caty Llamas is a 65-year-old woman with a recently diagnosed undifferentiated pleomorphic sarcoma centered at the distal lateral aspect of the left thigh, cT3 (14 cm, abutting the posterior aspect of the femoral cortex without definite involvement of cortical bone, encasing profunda femoris vein), cN0, cM0(PET/CT with mildly enlarged left axillary node that are favored to be reactive), status post biopsy.  She is seen in consultation regarding the role for neoadjuvant radiotherapy.  As per  "above, she will need to see medical oncology regarding the role of neoadjuvant chemotherapy.  An urgent order for this consultation has been placed, and I have messaged our medical oncology colleagues about the case.  Once the role and timing of systemic therapy is determined, we will schedule her for CT simulation for radiation planning accordingly.  As delineated above, we would recommend conventionally fractionated radiotherapy using IG-IMRT to a total dose of 5000 cGy in 25 fractions.    We appreciate the opportunity to participate in Ms. Llamas's care. She was encouraged to contact me with questions or concerns should they arise.    I personally reviewed the available MRI and PET/CT images. Laboratory data and pathology as noted above was reviewed. I reviewed previous medical records, which are summarized in the HPI. A total of 60 minutes were spent (including face to face time) during this visit.     Araceli Huff MD MPH PhD    Department of Radiation Oncology          Oncology Rooming Note    Amita 3, 2025 1:56 PM   Caty Llamas is a 65 year old female who presents for:    Chief Complaint   Patient presents with     Cancer     Consult   Undifferentiated pleomorphic sarcoma of the posterior L thigh    Initial Vitals: BP (!) 144/63 (BP Location: Right arm)   Pulse 76   Wt 93.1 kg (205 lb 3.2 oz)   LMP  (LMP Unknown)   SpO2 98%   BMI 37.53 kg/m   Estimated body mass index is 37.53 kg/m  as calculated from the following:    Height as of 5/23/25: 1.575 m (5' 2\").    Weight as of this encounter: 93.1 kg (205 lb 3.2 oz). Body surface area is 2.02 meters squared.  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 Patient is postmenopausal.  Allergies reviewed: Yes  Medications reviewed: Yes    Medications: Medication refills not needed today.  Pharmacy name entered into [employer]: [id number] - [place], [place] - [address] " "[facility] [address] OF [facility]    Frailty Screening:   Is the patient here for a new oncology consult visit in cancer care? 2. 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 After elevating her leg for 10 minutes at home the mass will return to the size of 1/2 a lemon.\"  Pt also states while at work the pain at the site of the mass is 7/10 for severity, whereas when not working she denies pain.  This RN refrained from discussing Pt's diagnosis as it was discovered she needed more information regarding her biopsy results from 5/23/25. Dr. Mai and Dr. Huff was notified.      JESSICA Morataya"

## 2025-06-05 ENCOUNTER — PATIENT OUTREACH (OUTPATIENT)
Dept: ONCOLOGY | Facility: CLINIC | Age: 65
End: 2025-06-05
Payer: COMMERCIAL

## 2025-06-05 DIAGNOSIS — C49.9 UNDIFFERENTIATED PLEOMORPHIC SARCOMA (H): Primary | ICD-10-CM

## 2025-06-05 NOTE — TELEPHONE ENCOUNTER
RECORDS STATUS - ALL OTHER DIAGNOSIS      RECORDS RECEIVED FROM: [facility]   NOTES STATUS DETAILS   OFFICE NOTE from referring provider Epic 6/3/25: Araceli James    OFFICE NOTE from other specialist Epic 5/23/25: Dr. Jose R Hammond    OPERATIVE REPORT Epic 5/23/25: Soft tissue mass, left thigh, biopsy    MEDICATION LIST [facility]    LABS     PATHOLOGY REPORTS Report in Epic 5/23/25: [id number]    ANYTHING RELATED TO DIAGNOSIS Epic Most recent 6/3/25   IMAGING (NEED IMAGES & REPORT)     MRI PACS 5/19/25: MR Femur    ULTRASOUND PACS 5/5/25, 3/31/25: US Lower Extremity    PET PACS 6/3/25: CT Soft Tissue Neck

## 2025-06-05 NOTE — PROGRESS NOTES
New Patient Hematology / Oncology Nurse Navigator Note     Referral Date: 65/25    Referring provider: Dr JAY Huff    Referring Clinic/Organization: [facility]     Referred to: Medical Oncology    Requested provider (if applicable): First available - did not specify     Evaluation for : Undifferentiated pleomorphic sarcoma      Clinical History (per Nurse review of records provided):    6/3 RT Consult note -- BOOKMARKED      Clinical Assessment / Barriers to Care (Per Nurse):    NIKOS - Call placed to Becca to discuss referral and some openings with Dr Hairston next week, unable to reach by phone, left detailed VM with call back information for writer and NPS      Records Location: Epic     Records Needed:     See Pre-Visit encounter from CSS team for additional details on records collection. Additional questions on records needed for initial consult can be sent to P ONC ADULT NEW PATIENT RECORDS [[id number]] with a copy to P CANCER CARE NURSE NAVIGATION [[id number]]. Please include diagnosis and urgency in subject line.    Additional testing needed prior to consult:     N/A    Referral updates and Plan:     Triage instructions updated and sent to NPS for completion      GILBERT ZhengN, RN, PHN, OCN  Hematology/Oncology Nurse Navigator   [facility]   [phone]   [email or web address]

## 2025-06-09 NOTE — PROGRESS NOTES
Virtual Visit Details    Type of service:  Video Visit   Start about 256  Stiop about 337  Pt in car in [facility] offsite  Drew hodge            6-10-25    I saw Becca Llamas being referred for a sarcoma of the thigh.  In brief she noticed a rather sudden discomfort in her thigh on March 28.  She noticed there was some swelling and erythema of the skin which then regressed slightly but remained uncomfortable to pressure mostly when sitting.  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  Other than this not much else is bothering her.  She sleeps okay with a pillow under her knee.  It is most bothersome when she is at work sitting and she has to rest her feet on a platform to elevate the leg off the chair.  She has no problem with stairs going up to floors okay and can walk without a problem.  She does not think it is symptomatically changed much in the last 1 month but is concerned about the delay.  She has an occasional episode of heartburn not requiring any treatment and had a recent colonoscopy that was okay.  On specific questioning she does describe what is most likely some mild carpal tunnel syndrome of both hands and she does a lot of computer work.  She is treating this with stretching.  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  She also has treated hypertension and is on metoprolol and amlodipine.  She has seen radiation therapy.    The past medical history is notable for history of abnormal pap smear 2011, HBP, L ACL knee surgery history    Family history is notable for some type of cancer leading to death in his sister.  She has 7 other siblings who are otherwise healthy.    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  She has a strong  [place] jaleesa.    She describes no clear allergies but notes that aspirin can irritate her stomach.    On exam she appeared comfortable with normal affect.  HEENT full EOMs  CHEST no respiratory distress  NEURO normal mentation and speech  PSYCH appropriate mood    -  On 5-12-25 creat 0.69,     -  Case: [id number]   5-23-25  A. Soft tissue mass, left thigh, biopsy:   - High-grade sarcoma, consistent with undifferentiated pleomorphic sarcoma.    -  I reviewed the images showing the large mass and no definite metastatic disease.    MRI L femur/thigh 5-19-25  IMPRESSION:  1. 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  Referral to orthopedic oncology at the [facility] for  definitive diagnosis with biopsy/histopathology.     2. No marrow signal changes to suggest fracture or marrow  infiltration.      PET-CT 6-3-25  IMPRESSION:   1. Hypermetabolic soft tissue mass in the distal thigh, consistent  with biopsy-proven undifferentiated pleomorphic sarcoma.  2. No evidence of metastatic disease in the body.  3. Mildly enlarged left axillary lymph nodes with low level FDG  uptake, favored reactive.  Likely secondary to pneumovax 2/26/2024  although typically that resolves in a couple of months.  Attention on  f/u.-    -  We discussed the situation and the nature of cancer in general and her sarcoma in particular.  Using the Renan data she has about a 50% chance of recurrence and a 40% chance of death in 5 years (50% at 10 years).    We discussed the role of chemotherapy and immunotherapy.  Specifically we went over the typical toxicities of Doxil with ifosfamide and also Keytruda.  I pointed out there is not uniform acceptance of which would be preferable but that the data showing benefit is probably stronger for immunotherapy which usually has less  toxicity.    We do want to see the NGS results via [facility] on the biopsy.    Recent data showed in a phase II study modified intention to treat analysis of 127 patients with UPS including myxofibrosarcoma (114 patients) or liposarcoma (13 patients), the addition of neoadjuvant and adjuvant pembrolizumab to preoperative radiotherapy and surgery improved the two-year disease-free survival (67 versus 52 percent; HR 0.61, 90% CI 0.39-0.96).   Patients in the experimental group received three cycles of pembrolizumab every three weeks given prior to, concurrently with, and after radiation followed by surgery and up to 14 cycles of adjuvant pembrolizumab.  Common toxicities included radiation dermatitis, fatigue, and pain.     Bradly YM, Ben KV, Pankaj AM, et al. Safety and efficacy of pembrolizumab, radiation therapy, and surgery versus radiation therapy and surgery for stage III soft tissue sarcoma of the extremity ([id number]): an open-label, randomised clinical trial. Lancet 2024; 404:2053.      After some discussion she decided to go with immunotherapy which I think is probably the preferable choice.  We just did discuss the chance of having to take thyroid permanently is probably on the order of 15% but the incidence of severe toxicity is relatively low.    We will try to arrange the Keytruda to start as soon as possible and have her see a PA at the same time.  Radiation can proceed concurrently.    She will need chest imaging every 3 months for 2 years followed by every 4 months for 3 years before yearly imaging for another 5.  Local imaging will be per Ortho but probably every 6 months.    We discussed the possibility of seeing palliative but she feels at this point this is not necessary and will contact us should she desire that.    All of her questions were addressed and she will call if others arise.  t> 90 minutes including chart image review documentation and orders.    Anselmo Hairston,  M.D.  Professor  Hematology, Oncology and Transplantation

## 2025-06-10 ENCOUNTER — PRE VISIT (OUTPATIENT)
Dept: ONCOLOGY | Facility: CLINIC | Age: 65
End: 2025-06-10
Payer: COMMERCIAL

## 2025-06-10 ENCOUNTER — VIRTUAL VISIT (OUTPATIENT)
Dept: ONCOLOGY | Facility: CLINIC | Age: 65
End: 2025-06-10
Attending: RADIOLOGY
Payer: COMMERCIAL

## 2025-06-10 VITALS — BODY MASS INDEX: 36.25 KG/M2 | WEIGHT: 197 LBS | HEIGHT: 62 IN

## 2025-06-10 DIAGNOSIS — C49.9 SARCOMA OF SOFT TISSUE (H): Primary | ICD-10-CM

## 2025-06-10 DIAGNOSIS — Z87.42 HISTORY OF ABNORMAL CERVICAL PAP SMEAR: ICD-10-CM

## 2025-06-10 DIAGNOSIS — I10 PRIMARY HYPERTENSION: ICD-10-CM

## 2025-06-10 DIAGNOSIS — C49.9 UNDIFFERENTIATED PLEOMORPHIC SARCOMA (H): ICD-10-CM

## 2025-06-10 DIAGNOSIS — J45.20 MILD INTERMITTENT COLD-INDUCED ASTHMA WITHOUT COMPLICATION: ICD-10-CM

## 2025-06-10 DIAGNOSIS — Z98.890 HISTORY OF LEFT KNEE SURGERY: ICD-10-CM

## 2025-06-10 RX ORDER — EPINEPHRINE 1 MG/ML
0.3 INJECTION, SOLUTION INTRAMUSCULAR; SUBCUTANEOUS EVERY 5 MIN PRN
OUTPATIENT
Start: 2025-06-23

## 2025-06-10 RX ORDER — HEPARIN SODIUM,PORCINE 10 UNIT/ML
5-20 VIAL (ML) INTRAVENOUS DAILY PRN
OUTPATIENT
Start: 2025-06-23

## 2025-06-10 RX ORDER — ALBUTEROL SULFATE 0.83 MG/ML
2.5 SOLUTION RESPIRATORY (INHALATION)
OUTPATIENT
Start: 2025-06-23

## 2025-06-10 RX ORDER — DIPHENHYDRAMINE HYDROCHLORIDE 50 MG/ML
50 INJECTION, SOLUTION INTRAMUSCULAR; INTRAVENOUS
Start: 2025-06-23

## 2025-06-10 RX ORDER — DIPHENHYDRAMINE HYDROCHLORIDE 50 MG/ML
25 INJECTION, SOLUTION INTRAMUSCULAR; INTRAVENOUS
Start: 2025-06-23

## 2025-06-10 RX ORDER — MEPERIDINE HYDROCHLORIDE 25 MG/ML
25 INJECTION INTRAMUSCULAR; INTRAVENOUS; SUBCUTANEOUS
OUTPATIENT
Start: 2025-06-23

## 2025-06-10 RX ORDER — LORAZEPAM 2 MG/ML
0.5 INJECTION INTRAMUSCULAR EVERY 4 HOURS PRN
OUTPATIENT
Start: 2025-06-23

## 2025-06-10 RX ORDER — ALBUTEROL SULFATE 90 UG/1
1-2 INHALANT RESPIRATORY (INHALATION)
Start: 2025-06-23

## 2025-06-10 RX ORDER — HEPARIN SODIUM (PORCINE) LOCK FLUSH IV SOLN 100 UNIT/ML 100 UNIT/ML
5 SOLUTION INTRAVENOUS
OUTPATIENT
Start: 2025-06-23

## 2025-06-10 RX ORDER — METHYLPREDNISOLONE SODIUM SUCCINATE 40 MG/ML
40 INJECTION INTRAMUSCULAR; INTRAVENOUS
Start: 2025-06-23

## 2025-06-10 ASSESSMENT — PAIN SCALES - GENERAL: PAINLEVEL_OUTOF10: NO PAIN (0)

## 2025-06-10 NOTE — NURSING NOTE
Is the patient currently in the state of [place]? YES    Visit mode: VIDEO    If the visit is dropped, the patient can be reconnected by:VIDEO VISIT: Send to e-mail at: [email or web address]    Will anyone else be joining the visit? NO  (If patient encounters technical issues they should call [phone]    Are changes needed to the allergy or medication list? No    Are refills needed on medications prescribed by this physician? NO    Rooming Documentation:  Questionnaire(s) completed    Reason for visit: Video Visit (New apt )    Sandra HAMILTON

## 2025-06-10 NOTE — LETTER
6/10/2025      Caty Llamas  [address]  [place] [address]      Dear Colleague,    Thank you for referring your patient, Caty Llamas, to the [facility]. Please see a copy of my visit note below.    Virtual Visit Details    Type of service:  Video Visit   Start about 256  Stiop about 337  Pt in [facility] offsite  [place]            6-10-25    I saw Becca Llamas being referred for a sarcoma of the thigh.  In brief she noticed a rather sudden discomfort in her thigh on March 28.  She noticed there was some swelling and erythema of the skin which then regressed slightly but remained uncomfortable to pressure mostly when sitting.  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  Other than this not much else is bothering her.  She sleeps okay with a pillow under her knee.  It is most bothersome when she is at work sitting and she has to rest her feet on a platform to elevate the leg off the chair.  She has no problem with stairs going up to floors okay and can walk without a problem.  She does not think it is symptomatically changed much in the last 1 month but is concerned about the delay.  She has an occasional episode of heartburn not requiring any treatment and had a recent colonoscopy that was okay.  On specific questioning she does describe what is most likely some mild carpal tunnel syndrome of both hands and she does a lot of computer work.  She is treating this with stretching.  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  She also has treated hypertension and is on metoprolol and amlodipine.  She has seen radiation therapy.    The past medical history is notable for history of abnormal pap smear 2011, HBP, L ACL knee surgery history    Family history is notable for some type of cancer leading to death in his sister.  She has 7 other  siblings who are otherwise healthy.    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  She has a strong [place] jaleesa.    She describes no clear allergies but notes that aspirin can irritate her stomach.    On exam she appeared comfortable with normal affect.  HEENT full EOMs  CHEST no respiratory distress  NEURO normal mentation and speech  PSYCH appropriate mood    -  On 5-12-25 creat 0.69,     -  Case: [id number]   5-23-25  A. Soft tissue mass, left thigh, biopsy:   - High-grade sarcoma, consistent with undifferentiated pleomorphic sarcoma.    -  I reviewed the images showing the large mass and no definite metastatic disease.    MRI L femur/thigh 5-19-25  IMPRESSION:  1. 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  Referral to orthopedic oncology at the [facility] for  definitive diagnosis with biopsy/histopathology.     2. No marrow signal changes to suggest fracture or marrow  infiltration.      PET-CT 6-3-25  IMPRESSION:   1. Hypermetabolic soft tissue mass in the distal thigh, consistent  with biopsy-proven undifferentiated pleomorphic sarcoma.  2. No evidence of metastatic disease in the body.  3. Mildly enlarged left axillary lymph nodes with low level FDG  uptake, favored reactive.  Likely secondary to pneumovax 2/26/2024  although typically that resolves in a couple of months.  Attention on  f/u.-    -  We discussed the situation and the nature of cancer in general and her sarcoma in particular.  Using the Renan data she has about a 50% chance of recurrence and a 40% chance of death in 5 years (50% at 10 years).    We discussed the role of chemotherapy and immunotherapy.  Specifically we  went over the typical toxicities of Doxil with ifosfamide and also Keytruda.  I pointed out there is not uniform acceptance of which would be preferable but that the data showing benefit is probably stronger for immunotherapy which usually has less toxicity.    We do want to see the NGS results via [facility] on the biopsy.    Recent data showed in a phase II study modified intention to treat analysis of 127 patients with UPS including myxofibrosarcoma (114 patients) or liposarcoma (13 patients), the addition of neoadjuvant and adjuvant pembrolizumab to preoperative radiotherapy and surgery improved the two-year disease-free survival (67 versus 52 percent; HR 0.61, 90% CI 0.39-0.96).   Patients in the experimental group received three cycles of pembrolizumab every three weeks given prior to, concurrently with, and after radiation followed by surgery and up to 14 cycles of adjuvant pembrolizumab.  Common toxicities included radiation dermatitis, fatigue, and pain.     Bradly ESTRADA, Ben KV, Pankaj AM, et al. Safety and efficacy of pembrolizumab, radiation therapy, and surgery versus radiation therapy and surgery for stage III soft tissue sarcoma of the extremity ([id number]): an open-label, randomised clinical trial. Lancet 2024; 404:2053.      After some discussion she decided to go with immunotherapy which I think is probably the preferable choice.  We just did discuss the chance of having to take thyroid permanently is probably on the order of 15% but the incidence of severe toxicity is relatively low.    We will try to arrange the Keytruda to start as soon as possible and have her see a PA at the same time.  Radiation can proceed concurrently.    She will need chest imaging every 3 months for 2 years followed by every 4 months for 3 years before yearly imaging for another 5.  Local imaging will be per Ortho but probably every 6 months.    We discussed the possibility of seeing palliative but she feels at this point  this is not necessary and will contact us should she desire that.    All of her questions were addressed and she will call if others arise.  t> 90 minutes including chart image review documentation and orders.    Anselmo Hairston M.D.  Professor  Hematology, Oncology and Transplantation            Again, thank you for allowing me to participate in the care of your patient.        Sincerely,        Anselmo Hairston MD    Electronically signed

## 2025-06-11 ENCOUNTER — PATIENT OUTREACH (OUTPATIENT)
Dept: ONCOLOGY | Facility: CLINIC | Age: 65
End: 2025-06-11
Payer: COMMERCIAL

## 2025-06-11 NOTE — TELEPHONE ENCOUNTER
[facility]: Cancer Care                                                                                          Introduction call to patient, reached message and left message call back. [facility]    Signature:  Nemo Winkler RN

## 2025-06-11 NOTE — TELEPHONE ENCOUNTER
RN Care Coordination Note     OUTGOING CALL: spoke with patient via telephone call    Provided RNCC contact information, [facility] phone number (which has options to talk with a Nurse available 24/7 - triage and RNCC via this option during business hours).   Reviewed appropriate use of MyChart, not to be used for symptom reporting.   Reviewed [facility] care team members including midlevel providers in oncology dept and Dr. Hairston's usual clinic hours.    Immunotherapy Teach  re: Pembrolizumab treatment plan every 3 weeks x8 cycles  -See Pt Education documentation - Chemo Effects (Adult)  -Reviewed treatment schedule including cycle length, lab monitoring and prn medications.  -Verbal and written instruction provided using:   [facility] via Drug Information: reviewed Possible Side Effects, When to Contact Your Doctor of Health Care Provider and Self Care Tips sections. Pt in the process of signing up for Mychart, will send education handout once Mychart is active, reviewed via phone today.     We discussed what to expect on day of infusion / [facility] visitor policy    Follow-up visit: pt prefers Mondays for infusions, understands she will be scheduled for labs, see an ANDREW in clinic then go to infusion each cycle.    She did schedule CT SIM in Radiation for this Monday 6/16    E-Sym teach completed:     Pt will receive a task questionnaire on Mychart in 5 days to inquire about symptoms, depending on answers, this may be routed to the triage nurse team to follow-up by phone. Advise pt to still call triage for any concerns and symptoms as the questionnaire does not take     Pt voiced understanding of above instructions and information and denied further questions    Signature:  Nemo Winkler RN

## 2025-06-16 ENCOUNTER — OFFICE VISIT (OUTPATIENT)
Dept: RADIATION ONCOLOGY | Facility: CLINIC | Age: 65
End: 2025-06-16
Attending: ORTHOPAEDIC SURGERY
Payer: COMMERCIAL

## 2025-06-16 DIAGNOSIS — C49.9 UNDIFFERENTIATED PLEOMORPHIC SARCOMA (H): Primary | ICD-10-CM

## 2025-06-16 PROCEDURE — 99211 OFF/OP EST MAY X REQ PHY/QHP: CPT | Performed by: RADIOLOGY

## 2025-06-16 PROCEDURE — 77334 RADIATION TREATMENT AID(S): CPT | Performed by: RADIOLOGY

## 2025-06-16 NOTE — LETTER
6/16/2025      Caty Llamas  [address]  [place] [address]      Dear Colleague,    Thank you for referring your patient, Caty Llamas, to the [facility] RADIATION ONCOLOGY. Please see a copy of my visit note below.    Patient seen in clinic by Dr. Squires to review treatment recommendations and complete consent form prior to CT simulation.     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  She has previously met with Dr. Huff in consultation.    We reviewed the expected course of treatment, the acute toxicities and potential long-term risks of radiation.  The patient asked questions which were answered such that she verbalized understanding of the issues discussed.  Informed consent was obtained.    Cherie Squires MD  Radiation Oncology    Again, thank you for allowing me to participate in the care of your patient.        Sincerely,        Cherie Squires MD    Electronically signed

## 2025-06-16 NOTE — PROGRESS NOTES
Radiation Therapy Patient Education    Person involved with teaching: Patient    Patient educational needs for self management of treatment-related side effects assessment completed.  [facility] Patient Ed tab contains Patient Learning Assessment    Education Materials Given  Radiation Therapy and You  Treatment schedule   parking pass     Educational Topics Discussed  Side effects expected, Skin care, and When to call MD/RN    Response To Teaching  Verbalizes understanding    GYN Only  Vaginal Dilator-given and educated: N/A    Referrals sent: None    Chemotherapy?  Yes: [facility]. Dr. Hairston.

## 2025-06-16 NOTE — LETTER
6/16/2025      Caty Llamas  [address]  [place] [address]      Dear Colleague,    Thank you for referring your patient, Caty Llamas, to the [facility] RADIATION ONCOLOGY. Please see a copy of my visit note below.    Radiation Therapy Patient Education    Person involved with teaching: Patient    Patient educational needs for self management of treatment-related side effects assessment completed.  EP[facility] Patient Ed tab contains Patient Learning Assessment    Education Materials Given  Radiation Therapy and You  Treatment schedule   parking pass     Educational Topics Discussed  Side effects expected, Skin care, and When to call MD/RN    Response To Teaching  Verbalizes understanding    GYN Only  Vaginal Dilator-given and educated: N/A    Referrals sent: None    Chemotherapy?  Yes: [facility]. Dr. Hairston.       Again, thank you for allowing me to participate in the care of your patient.        Sincerely,        Cherie Squires MD    Electronically signed

## 2025-06-16 NOTE — PROGRESS NOTES
A radiation therapy treatment planning simulation was performed.  Please see the [employer] record for documentation.    Cherie Squires MD  Radiation Oncology

## 2025-06-19 NOTE — PROGRESS NOTES
Virtual Visit Details    Type of service:  Video Visit   Start about 256  Stiop about 337  Pt in car in [facility] offsite  Drew hodge    6/20/2025    I saw Becca Llamas being referred for a sarcoma of the thigh.  In brief she noticed a rather sudden discomfort in her thigh on March 28.  She noticed there was some swelling and erythema of the skin which then regressed slightly but remained uncomfortable to pressure mostly when sitting.  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  Other than this not much else is bothering her.  She sleeps okay with a pillow under her knee.  It is most bothersome when she is at work sitting and she has to rest her feet on a platform to elevate the leg off the chair.  She has no problem with stairs going up to floors okay and can walk without a problem.  She does not think it is symptomatically changed much in the last 1 month but is concerned about the delay.  She has an occasional episode of heartburn not requiring any treatment and had a recent colonoscopy that was okay.  On specific questioning she does describe what is most likely some mild carpal tunnel syndrome of both hands and she does a lot of computer work.  She is treating this with stretching.  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  She also has treated hypertension and is on metoprolol and amlodipine.  She has seen radiation therapy.    The past medical history is notable for history of abnormal pap smear 2011, HBP, L ACL knee surgery history    Family history is notable for some type of cancer leading to death in his sister.  She has 7 other siblings who are otherwise healthy.    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  She has a strong Gnosticism  jaleesa.    She describes no clear allergies but notes that aspirin can irritate her stomach.    On exam she appeared comfortable with normal affect.  HEENT full EOMs  CHEST no respiratory distress  NEURO normal mentation and speech  PSYCH appropriate mood    -  On 5-12-25 creat 0.69,     -  Case: [id number]   5-23-25  A. Soft tissue mass, left thigh, biopsy:   - High-grade sarcoma, consistent with undifferentiated pleomorphic sarcoma.    -  I reviewed the images showing the large mass and no definite metastatic disease.    MRI L femur/thigh 5-19-25  IMPRESSION:  1. 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  Referral to orthopedic oncology at the [facility] for  definitive diagnosis with biopsy/histopathology.     2. No marrow signal changes to suggest fracture or marrow  infiltration.      PET-CT 6-3-25  IMPRESSION:   1. Hypermetabolic soft tissue mass in the distal thigh, consistent  with biopsy-proven undifferentiated pleomorphic sarcoma.  2. No evidence of metastatic disease in the body.  3. Mildly enlarged left axillary lymph nodes with low level FDG  uptake, favored reactive.  Likely secondary to pneumovax 2/26/2024  although typically that resolves in a couple of months.  Attention on  f/u.-    -  We discussed the situation and the nature of cancer in general and her sarcoma in particular.  Using the Yeso data she has about a 50% chance of recurrence and a 40% chance of death in 5 years (50% at 10 years).    We discussed the role of chemotherapy and immunotherapy.  Specifically we went over the typical toxicities of Doxil with ifosfamide and also Keytruda.  I pointed out there is not uniform acceptance of which would be preferable but that the data showing benefit is probably stronger for immunotherapy which usually has less  toxicity.    We do want to see the NGS results via Caris on the biopsy.    Recent data showed in a phase II study modified intention to treat analysis of 127 patients with UPS including myxofibrosarcoma (114 patients) or liposarcoma (13 patients), the addition of neoadjuvant and adjuvant pembrolizumab to preoperative radiotherapy and surgery improved the two-year disease-free survival (67 versus 52 percent; HR 0.61, 90% CI 0.39-0.96).   Patients in the experimental group received three cycles of pembrolizumab every three weeks given prior to, concurrently with, and after radiation followed by surgery and up to 14 cycles of adjuvant pembrolizumab.  Common toxicities included radiation dermatitis, fatigue, and pain.     Bradly YM, Ben KV, Pankaj AM, et al. Safety and efficacy of pembrolizumab, radiation therapy, and surgery versus radiation therapy and surgery for stage III soft tissue sarcoma of the extremity ([id number]): an open-label, randomised clinical trial. Lancet 2024; 404:2053.      After some discussion she decided to go with immunotherapy which I think is probably the preferable choice.  We just did discuss the chance of having to take thyroid permanently is probably on the order of 15% but the incidence of severe toxicity is relatively low.    We will try to arrange the Keytruda to start as soon as possible and have her see a PA at the same time.  Radiation can proceed concurrently.    She will need chest imaging every 3 months for 2 years followed by every 4 months for 3 years before yearly imaging for another 5.  Local imaging will be per Ortho but probably every 6 months.    We discussed the possibility of seeing palliative but she feels at this point this is not necessary and will contact us should she desire that.        Reason for Visit: ***    Oncology HPI:   sarcoma of the thigh    Interval history:   She is seen virtually today for evaluation prior to starting treatment   -She did look up  "side effects of pembrolizumab and she has some concerns.   -She is feeling really healthy now.  -She has had anxiety over the last few days. She has been relying on prayer.  -Sometimes getting a bit of indigestion, but she feels this is related to her new anxiety in the last few.   -Discussed with her family and she let me know that her sister  from cancer and took chemotherapy.  -No issues with her lungs. No cough  Physical good  [place] negative  -No const, perlita, nausea  -Some lower extremity swelling on the left that hs been waxing and waning.    -Has excellent family and friend support.     Current Outpatient Medications   Medication Sig Dispense Refill    amLODIPine (NORVASC) 10 MG tablet Take 1 tablet (10 mg) by mouth daily. 90 tablet 3    metoprolol succinate ER (TOPROL XL) 50 MG 24 hr tablet Take 1 tablet (50 mg) by mouth daily. 90 tablet 3    albuterol (PROAIR HFA/PROVENTIL HFA/VENTOLIN HFA) 108 (90 Base) MCG/ACT inhaler Inhale 2 puffs into the lungs every 6 hours as needed for shortness of breath, wheezing or cough. 18 g 1          Allergies   Allergen Reactions    Butalbital-Aspirin-Caffeine     Salicylates      ferenol         Exam: *** Height 1.626 m (5' 4\"), weight 89.4 kg (197 lb), not currently breastfeeding.  Wt Readings from Last 4 Encounters:   25 89.4 kg (197 lb)   06/10/25 89.4 kg (197 lb)   25 93.1 kg (205 lb 3.2 oz)   25 93.8 kg (206 lb 14.4 oz)     ***    Labs:   Labs scheduled for Monday.       Imaging:   No new imaging today.    Impression/plan:   Sarcoma:       Nithya Acuna, APRN, CNP  [facility] [address] [address]  [phone]      "

## 2025-06-20 ENCOUNTER — VIRTUAL VISIT (OUTPATIENT)
Dept: ONCOLOGY | Facility: CLINIC | Age: 65
End: 2025-06-20
Attending: PHYSICIAN ASSISTANT
Payer: COMMERCIAL

## 2025-06-20 VITALS — WEIGHT: 197 LBS | BODY MASS INDEX: 33.63 KG/M2 | HEIGHT: 64 IN

## 2025-06-20 DIAGNOSIS — C49.9 SARCOMA OF SOFT TISSUE (H): Primary | ICD-10-CM

## 2025-06-20 PROCEDURE — 98007 SYNCH AUDIO-VIDEO EST HI 40: CPT | Performed by: NURSE PRACTITIONER

## 2025-06-20 NOTE — NURSING NOTE
Current patient location: [address] [place] [address]    Is the patient currently in the state of [place]? YES    Visit mode: VIDEO    If the visit is dropped, the patient can be reconnected by:VIDEO VISIT: Text to cell phone:   Telephone Information:   Mobile [phone]       Will anyone else be joining the visit? NO  (If patient encounters technical issues they should call [phone]    Are changes needed to the allergy or medication list? No    Are refills needed on medications prescribed by this physician? NO    Rooming Documentation:  Questionnaire(s) completed    Reason for visit: RECHECK    Diann Stoddard VVF  No vitals

## 2025-06-20 NOTE — LETTER
2025      Caty Llamas  [address]  [place] [address]      Dear Colleague,    Thank you for referring your patient, Caty Llamas, to the [facility]. Please see a copy of my visit note below.    Virtual Visit Details    Type of service:  Video Visit   Patient at home  Prov offsite  Drew hodge    2025      Reason for Visit: Follow-up sarcoma prior to starting treatment    Oncology HPI:   sarcoma of the thigh    Interval history:   She is seen virtually today for evaluation prior to starting treatment with pembrolizumab.  -She did look up side effects of pembrolizumab and she has some concerns, and we take the time to review these today and her questions are answered to her satisfaction.  -She has had anxiety over the last few days. She has been relying on prayer.  -Sometimes gets a bit of indigestion, but she feels this is related to her new anxiety in the last few.   -Discussed with her family and she let me know that her sister  from cancer and took chemotherapy, which is also causing her some concern.  -No issues with her lungs. No cough, chest pain or shortness of breath at rest.   -Physically she is feeling well.  -No constipation, diarrhea, nausea at baseline.   -Some lower extremity swelling on the left that hs been waxing and waning.     -Has excellent family and friend support.     Current Outpatient Medications   Medication Sig Dispense Refill     amLODIPine (NORVASC) 10 MG tablet Take 1 tablet (10 mg) by mouth daily. 90 tablet 3     metoprolol succinate ER (TOPROL XL) 50 MG 24 hr tablet Take 1 tablet (50 mg) by mouth daily. 90 tablet 3     albuterol (PROAIR HFA/PROVENTIL HFA/VENTOLIN HFA) 108 (90 Base) MCG/ACT inhaler Inhale 2 puffs into the lungs every 6 hours as needed for shortness of breath, wheezing or cough. 18 g 1          Allergies   Allergen Reactions     Butalbital-Aspirin-Caffeine      Salicylates      ferenol       Exam:  Height  "1.626 m (5' 4\"), weight 89.4 kg (197 lb), not currently breastfeeding.  Wt Readings from Last 4 Encounters:   06/20/25 89.4 kg (197 lb)   06/10/25 89.4 kg (197 lb)   06/03/25 93.1 kg (205 lb 3.2 oz)   05/23/25 93.8 kg (206 lb 14.4 oz)     Objective:  General: patient appears well in no acute distress, alert and oriented, speech clear and fluid  Skin: no visualized rash or lesions on visualized skin  Resp: Appears to be breathing comfortably without accessory muscle usage, speaking in full sentences, no audible wheezes or cough.  Psych: Coherent speech, normal rate and volume, able to articulate logical thoughts, able to abstract reason, no tangential thoughts, no hallucinations or delusions  Patient's affect is appropriate.     Labs:   Labs scheduled for Monday.     Imaging:   No new imaging today.    Impression/plan:   Sarcoma:   -High-grade sarcoma, consistent with undifferentiated pleomorphic sarcoma.  -Awaiting Caris testing.   -At her initial visit, she and Dr. Hairston discussed the role of chemotherapy and immunotherapy, specifically the typical toxicities of Doxil with ifosfamide and also Keytruda.  He reviewed with her that here is not uniform acceptance of which would be preferable but that the data showing benefit is probably stronger for immunotherapy which usually has less toxicity.  -She is clinically stable and in agreement with starting immunotherapy with pembrolizumab.   -She will need chest imaging every 3 months for 2 years followed by every 4 months for 3 years before yearly imaging for another 5.  Local imaging will be per Ortho but probably every 6 months.    ASCENCION Valdovinos, CNP  [facility] [address] [address]  [phone]      Again, thank you for allowing me to participate in the care of your patient.        Sincerely,        ASCENCION Day CNP    Electronically signed"

## 2025-06-23 ENCOUNTER — INFUSION THERAPY VISIT (OUTPATIENT)
Dept: ONCOLOGY | Facility: CLINIC | Age: 65
End: 2025-06-23
Attending: PHYSICIAN ASSISTANT
Payer: COMMERCIAL

## 2025-06-23 VITALS
BODY MASS INDEX: 34.79 KG/M2 | OXYGEN SATURATION: 98 % | TEMPERATURE: 98.8 F | SYSTOLIC BLOOD PRESSURE: 117 MMHG | DIASTOLIC BLOOD PRESSURE: 73 MMHG | HEART RATE: 70 BPM | RESPIRATION RATE: 16 BRPM | WEIGHT: 202.7 LBS

## 2025-06-23 DIAGNOSIS — C49.9 SARCOMA OF SOFT TISSUE (H): Primary | ICD-10-CM

## 2025-06-23 LAB
ALBUMIN SERPL BCG-MCNC: 4.2 G/DL (ref 3.5–5.2)
ALP SERPL-CCNC: 76 U/L (ref 40–150)
ALT SERPL W P-5'-P-CCNC: 17 U/L (ref 0–50)
ANION GAP SERPL CALCULATED.3IONS-SCNC: 11 MMOL/L (ref 7–15)
AST SERPL W P-5'-P-CCNC: 22 U/L (ref 0–45)
BASOPHILS # BLD AUTO: 0.1 10E3/UL (ref 0–0.2)
BASOPHILS NFR BLD AUTO: 1 %
BILIRUB SERPL-MCNC: 0.6 MG/DL
BUN SERPL-MCNC: 17 MG/DL (ref 8–23)
CALCIUM SERPL-MCNC: 9.7 MG/DL (ref 8.8–10.4)
CHLORIDE SERPL-SCNC: 106 MMOL/L (ref 98–107)
CREAT SERPL-MCNC: 0.79 MG/DL (ref 0.51–0.95)
EGFRCR SERPLBLD CKD-EPI 2021: 82 ML/MIN/1.73M2
EOSINOPHIL # BLD AUTO: 0.3 10E3/UL (ref 0–0.7)
EOSINOPHIL NFR BLD AUTO: 3 %
ERYTHROCYTE [DISTWIDTH] IN BLOOD BY AUTOMATED COUNT: 12.4 % (ref 10–15)
GLUCOSE SERPL-MCNC: 101 MG/DL (ref 70–99)
HCO3 SERPL-SCNC: 24 MMOL/L (ref 22–29)
HCT VFR BLD AUTO: 42.5 % (ref 35–47)
HGB BLD-MCNC: 14 G/DL (ref 11.7–15.7)
IMM GRANULOCYTES # BLD: 0 10E3/UL
IMM GRANULOCYTES NFR BLD: 0 %
LYMPHOCYTES # BLD AUTO: 1.4 10E3/UL (ref 0.8–5.3)
LYMPHOCYTES NFR BLD AUTO: 17 %
MCH RBC QN AUTO: 29.7 PG (ref 26.5–33)
MCHC RBC AUTO-ENTMCNC: 32.9 G/DL (ref 31.5–36.5)
MCV RBC AUTO: 90 FL (ref 78–100)
MONOCYTES # BLD AUTO: 0.7 10E3/UL (ref 0–1.3)
MONOCYTES NFR BLD AUTO: 8 %
NEUTROPHILS # BLD AUTO: 5.8 10E3/UL (ref 1.6–8.3)
NEUTROPHILS NFR BLD AUTO: 70 %
NRBC # BLD AUTO: 0 10E3/UL
NRBC BLD AUTO-RTO: 0 /100
PLATELET # BLD AUTO: 269 10E3/UL (ref 150–450)
POTASSIUM SERPL-SCNC: 4.1 MMOL/L (ref 3.4–5.3)
PROT SERPL-MCNC: 7.4 G/DL (ref 6.4–8.3)
RBC # BLD AUTO: 4.71 10E6/UL (ref 3.8–5.2)
SODIUM SERPL-SCNC: 141 MMOL/L (ref 135–145)
TSH SERPL DL<=0.005 MIU/L-ACNC: 1.14 UIU/ML (ref 0.3–4.2)
WBC # BLD AUTO: 8.2 10E3/UL (ref 4–11)

## 2025-06-23 PROCEDURE — 85004 AUTOMATED DIFF WBC COUNT: CPT

## 2025-06-23 PROCEDURE — 82310 ASSAY OF CALCIUM: CPT | Performed by: INTERNAL MEDICINE

## 2025-06-23 PROCEDURE — 250N000011 HC RX IP 250 OP 636: Mod: JZ | Performed by: INTERNAL MEDICINE

## 2025-06-23 PROCEDURE — 96413 CHEMO IV INFUSION 1 HR: CPT

## 2025-06-23 PROCEDURE — 84443 ASSAY THYROID STIM HORMONE: CPT | Performed by: INTERNAL MEDICINE

## 2025-06-23 PROCEDURE — 258N000003 HC RX IP 258 OP 636: Performed by: INTERNAL MEDICINE

## 2025-06-23 PROCEDURE — 36415 COLL VENOUS BLD VENIPUNCTURE: CPT | Performed by: INTERNAL MEDICINE

## 2025-06-23 RX ADMIN — SODIUM CHLORIDE 200 MG: 9 INJECTION, SOLUTION INTRAVENOUS at 12:39

## 2025-06-23 RX ADMIN — SODIUM CHLORIDE 250 ML: 0.9 INJECTION, SOLUTION INTRAVENOUS at 12:39

## 2025-06-23 ASSESSMENT — PAIN SCALES - GENERAL: PAINLEVEL_OUTOF10: NO PAIN (0)

## 2025-06-23 NOTE — PROGRESS NOTES
Infusion Nursing Note:  Caty Llamas presents today for C1D1 Pembrolizumab.    Patient seen by provider today: No   present during visit today: Not Applicable.    Note: Patient is new to the infusion room today and is receiving Pembrolizumab for the first time. Patient oriented to infusion room, location of bathrooms and nutrition stations, and call light. Verified that patient recieved written chemotherapy information previously. Teaching completed previously by Theresa Winkler RN. Verbally reviewed chemotherapy teaching, side effects, take-home medications, and follow-up schedule with patient. Patient instructed to call triage with any questions or if he experiences a temperature >100.4, shaking chills, uncontrolled nausea/vomiting/diarrhea, dizziness, shortness of breath, bleeding not relieved with pressure, or with any other concerns.      Patient presents today for her first infusion. She is feeling well and denies any acute concerns. She has an excellent understanding of her treatment today and we are grateful for her Oncology team and their support. Patient was instructed to call care team with any questions or concerns or with any new or worsening symptoms following treatment today.     Additional Comments:  - Patient will start radiation on Wednesday.   - Patient wishes to be enrolled in eSym program. We completed this today and discussed its purpose.   - Patient's friend, Mame, is an excellent source of encouragement for the patient. We are grateful for her ongoing support during this time.     Pre-Medications Administered:  No pre-medications ordered.     Intravenous Access:  Peripheral IV placed.    Treatment Conditions:  Lab Results   Component Value Date    HGB 14.0 06/23/2025    WBC 8.2 06/23/2025    ANEU 5.8 06/23/2025     06/23/2025     Lab Results   Component Value Date     06/23/2025    POTASSIUM 4.1 06/23/2025    CR 0.79 06/23/2025    LYNN 9.7 06/23/2025    BILITOTAL 0.6  06/23/2025    ALBUMIN 4.2 06/23/2025    ALT 17 06/23/2025    AST 22 06/23/2025   Results reviewed, labs MET treatment parameters, ok to proceed with treatment.    Electrolytes not needed today.   /73   Pulse 70   Temp 98.8  F (37.1  C) (Oral)   Resp 16   Wt 91.9 kg (202 lb 11.2 oz)   LMP  (LMP Unknown)   SpO2 98%   BMI 34.79 kg/m      Treatment plan verified via [facility]. Patient to receive Pembrolizumab every 21 days.     Post Infusion Assessment:  Patient tolerated infusion without incident.  Blood return noted pre and post infusion.  Site patent and intact, free from redness, edema or discomfort.  No evidence of extravasations.  Access discontinued per protocol.     Discharge Plan:   Patient declined prescription refills.  Discharge instructions reviewed with: Patient.  Patient and/or family verbalized understanding of discharge instructions and all questions answered.  Copy of AVS reviewed with patient and/or family. Patient will return 7/14 for next appointment.  Patient discharged in stable condition accompanied by: friend.  Departure Mode: Ambulatory.    Emy Montes RN

## 2025-06-23 NOTE — PATIENT INSTRUCTIONS
Contact Numbers    [facility] Main Line (for Scheduling/Triage/After Hours Nurse Line): [phone]    Please call the [facility] nurse triage or the after hours nurse line if you experience a temperature greater than or equal to 100.4, shaking chills, have uncontrolled nausea, vomiting and/or diarrhea, dizziness, lightheadedness, shortness of breath, chest pain, bleeding, unexplained bruising, or if you have any other new/concerning symptoms, questions or concerns.     If you are having any concerning symptoms or wish to speak to a provider before your next infusion visit, please call your care coordinator or triage to notify them so we can adequately serve you.     If you need any refills on medications (narcotics or other medications), please call before your infusion appointment.    Lab Results:  Recent Results (from the past 12 hours)   Comprehensive metabolic panel    Collection Time: 06/23/25 11:35 AM   Result Value Ref Range    Sodium 141 135 - 145 mmol/L    Potassium 4.1 3.4 - 5.3 mmol/L    Carbon Dioxide (CO2) 24 22 - 29 mmol/L    Anion Gap 11 7 - 15 mmol/L    Urea Nitrogen 17.0 8.0 - 23.0 mg/dL    Creatinine 0.79 0.51 - 0.95 mg/dL    GFR Estimate 82 >60 mL/min/1.73m2    Calcium 9.7 8.8 - 10.4 mg/dL    Chloride 106 98 - 107 mmol/L    Glucose 101 (H) 70 - 99 mg/dL    Alkaline Phosphatase 76 40 - 150 U/L    AST 22 0 - 45 U/L    ALT 17 0 - 50 U/L    Protein Total 7.4 6.4 - 8.3 g/dL    Albumin 4.2 3.5 - 5.2 g/dL    Bilirubin Total 0.6 <=1.2 mg/dL   TSH with free T4 reflex    Collection Time: 06/23/25 11:35 AM   Result Value Ref Range    TSH 1.14 0.30 - 4.20 uIU/mL   CBC with platelets and differential    Collection Time: 06/23/25 11:35 AM   Result Value Ref Range    WBC Count 8.2 4.0 - 11.0 10e3/uL    RBC Count 4.71 3.80 - 5.20 10e6/uL    Hemoglobin 14.0 11.7 - 15.7 g/dL    Hematocrit 42.5 35.0 - 47.0 %    MCV 90 78 - 100 fL    MCH 29.7 26.5 - 33.0 pg    MCHC 32.9 31.5 - 36.5 g/dL    RDW 12.4 10.0 - 15.0 %    Platelet  Count 269 150 - 450 10e3/uL    % Neutrophils 70 %    % Lymphocytes 17 %    % Monocytes 8 %    % Eosinophils 3 %    % Basophils 1 %    % Immature Granulocytes 0 %    NRBCs per 100 WBC 0 <1 /100    Absolute Neutrophils 5.8 1.6 - 8.3 10e3/uL    Absolute Lymphocytes 1.4 0.8 - 5.3 10e3/uL    Absolute Monocytes 0.7 0.0 - 1.3 10e3/uL    Absolute Eosinophils 0.3 0.0 - 0.7 10e3/uL    Absolute Basophils 0.1 0.0 - 0.2 10e3/uL    Absolute Immature Granulocytes 0.0 <=0.4 10e3/uL    Absolute NRBCs 0.0 10e3/uL

## 2025-06-23 NOTE — NURSING NOTE
Chief Complaint   Patient presents with    Blood Draw     Labs drawn via PIV by RN in lab.  VS taken       Labs drawn from PIV placed by RN. Line flushed with saline. Vitals taken. Pt checked in for appointment(s).    Michelle Alfaro RN

## 2025-06-25 ENCOUNTER — APPOINTMENT (OUTPATIENT)
Dept: RADIATION ONCOLOGY | Facility: CLINIC | Age: 65
End: 2025-06-25
Attending: ORTHOPAEDIC SURGERY
Payer: COMMERCIAL

## 2025-06-25 VITALS
WEIGHT: 203.5 LBS | DIASTOLIC BLOOD PRESSURE: 78 MMHG | BODY MASS INDEX: 34.93 KG/M2 | SYSTOLIC BLOOD PRESSURE: 125 MMHG | OXYGEN SATURATION: 98 % | HEART RATE: 77 BPM

## 2025-06-25 DIAGNOSIS — C49.9 UNDIFFERENTIATED PLEOMORPHIC SARCOMA (H): Primary | ICD-10-CM

## 2025-06-25 PROCEDURE — 3074F SYST BP LT 130 MM HG: CPT | Performed by: RADIOLOGY

## 2025-06-25 PROCEDURE — 1126F AMNT PAIN NOTED NONE PRSNT: CPT | Performed by: RADIOLOGY

## 2025-06-25 PROCEDURE — 77386 HC IMRT TREATMENT DELIVERY, COMPLEX: CPT | Performed by: RADIOLOGY

## 2025-06-25 PROCEDURE — 3078F DIAST BP <80 MM HG: CPT | Performed by: RADIOLOGY

## 2025-06-25 ASSESSMENT — PAIN SCALES - GENERAL: PAINLEVEL_OUTOF10: NO PAIN (0)

## 2025-06-25 NOTE — PROGRESS NOTES
RADIATION ONCOLOGY WEEKLY ON TREATMENT VISIT   Encounter Date: 2025     Patient Name: Caty Llamas  MRN: [id number]  : 1960     Disease and Stage: cT3 N0 M0 undifferentiated pleomorphic sarcoma  Treatment Site: Left thigh  Current Dose/Planned Total Dose: 200/5000 cGy  Current Fraction/Planned Total fractions:   Concurrent Chemotherapy: Yes  Drug and Frequency: Pembrolizumab, started 2025      ED visits/Hospitalizations:  None    Missed Treatments:  None    Subjective: Ms. Llamas presents to clinic today for her weekly on-treatment visit. She has started on pembrolizumab as of Monday. She has upcoming plans to work on a home in [place]. Her employer is working on accessing her an improved chair due to feeling of pressure when sitting for prolonged.    Nursing ROS:   Nutrition Alteration  Diet Type: Patient's Preference  Skin  Skin Reaction: 0 - No changes  Skin Intervention: Pt will start applying aquaphor daily at bedtime today.        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  Pt also has a new chair being ordered at work as well as a desk that can now be elevated.        Objective:   LMP  (LMP Unknown)    KPS 90    Pain Score Data Unavailable/10  General: Alert and in no acute distress.  HEENT: Normocephalic, atraumatic. No visible scleral icterus.  Neck: Apparent full range of motion.  CV: Appears well-perfused, with no visible cyanosis.  Lungs: Breathing easily on room air, with no difficulty completing full sentences  Extremities:  No visible edema of the upper extremities. She has bilateral mild L>R lower  extremity edema that is stable   Neuro: Alert and oriented; grossly nonfocal. Normal speech. Moving upper and lower extremities equally.  Gait within normal limits.  Skin: No visible jaundice. No suspicious lesions of the visualized integuments.  No skin changes today.  Psych: Mood and affect are appropriate to given situation. Answers questions appropriately.        Treatment-related toxicities (CTCAE v5.0):  Fatigue: Grade 0: No toxicity  Pain: Grade 0: No toxicity  Dermatitis: Grade 0: No toxicity    Assessment:    Ms. Caty Llamas is a 65-year-old woman with a recently diagnosed undifferentiated pleomorphic sarcoma centered at the distal lateral aspect of the left thigh, cT3 (14 cm, abutting the posterior aspect of the femoral cortex without definite involvement of cortical bone, encasing profunda femoris vein), cN0, cM0(PET/CT with mildly enlarged left axillary node that are favored to be reactive), status post biopsy.  She is receiving conventionally fractionated radiotherapy using IG-IMRT to a total dose of 5000 cGy in 25 fractions with concurrent pembrolizumab.      Plan:   Left thigh undifferentiated pleomorphic sarcoma:  Plan and anticipated side effects reviewed on 6/25/2025  Continue radiotherapy    Pain management:  None indicated    Dermatitis:  Skin care reviewed.    Mosaiq chart and setup information reviewed  IGRT images reviewed         Araceli Huff MD, MPH, PhD     Department of Radiation Oncology  [facility]

## 2025-06-25 NOTE — LETTER
2025      Caty Llamas  [address]  [place] [address]      Dear Colleague,    Thank you for referring your patient, Caty Llamas, to the [facility] RADIATION ONCOLOGY. Please see a copy of my visit note below.    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 Llamas presents to clinic today for her weekly on-treatment visit. She has started on pembrolizumab as of Monday. She has upcoming plans to work on a home in [place]. Her employer is working on accessing her an improved chair due to feeling of pressure when sitting for prolonged.    Nursing ROS:   Nutrition Alteration  Diet Type: Patient's Preference  Skin  Skin Reaction: 0 - No changes  Skin Intervention: Pt will start applying aquaphor daily at bedtime today.        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  Pt also has a new chair being ordered at work as well as a desk that can now be elevated.        Objective:   LMP  (LMP Unknown)    KPS 90    Pain Score Data Unavailable/10  General: Alert and in no acute distress.  HEENT: Normocephalic, atraumatic. No visible scleral icterus.  Neck: Apparent  full range of motion.  CV: Appears well-perfused, with no visible cyanosis.  Lungs: Breathing easily on room air, with no difficulty completing full sentences  Extremities:  No visible edema of the upper extremities. She has bilateral mild L>R lower extremity edema that is stable   Neuro: Alert and oriented; grossly nonfocal. Normal speech. Moving upper and lower extremities equally.  Gait within normal limits.  Skin: No visible jaundice. No suspicious lesions of the visualized integuments.  No skin changes today.  Psych: Mood and affect are appropriate to given situation. Answers questions appropriately.        Treatment-related toxicities (CTCAE v5.0):  Fatigue: Grade 0: No toxicity  Pain: Grade 0: No toxicity  Dermatitis: Grade 0: No toxicity    Assessment:    Ms. Caty Llamas is a 65-year-old woman with a recently diagnosed undifferentiated pleomorphic sarcoma centered at the distal lateral aspect of the left thigh, cT3 (14 cm, abutting the posterior aspect of the femoral cortex without definite involvement of cortical bone, encasing profunda femoris vein), cN0, cM0(PET/CT with mildly enlarged left axillary node that are favored to be reactive), status post biopsy.  She is receiving conventionally fractionated radiotherapy using IG-IMRT to a total dose of 5000 cGy in 25 fractions with concurrent pembrolizumab.      Plan:   Left thigh undifferentiated pleomorphic sarcoma:  Plan and anticipated side effects reviewed on 6/25/2025  Continue radiotherapy    Pain management:  None indicated    Dermatitis:  Skin care reviewed.    Mosaiq chart and setup information reviewed  IGRT images reviewed         Araceli Huff MD, MPH, PhD     Department of Radiation Oncology  [facility]        Again, thank you for allowing me to participate in the care of your patient.        Sincerely,        Araceli Huff    Electronically signed

## 2025-06-26 ENCOUNTER — APPOINTMENT (OUTPATIENT)
Dept: RADIATION ONCOLOGY | Facility: CLINIC | Age: 65
End: 2025-06-26
Attending: ORTHOPAEDIC SURGERY
Payer: COMMERCIAL

## 2025-06-26 PROCEDURE — 77386 HC IMRT TREATMENT DELIVERY, COMPLEX: CPT | Performed by: RADIOLOGY

## 2025-06-26 PROCEDURE — 77014 PR CT GUIDE FOR PLACEMENT RADIATION THERAPY FIELDS: CPT | Mod: 26 | Performed by: STUDENT IN AN ORGANIZED HEALTH CARE EDUCATION/TRAINING PROGRAM

## 2025-06-27 ENCOUNTER — APPOINTMENT (OUTPATIENT)
Dept: RADIATION ONCOLOGY | Facility: CLINIC | Age: 65
End: 2025-06-27
Attending: ORTHOPAEDIC SURGERY
Payer: COMMERCIAL

## 2025-06-27 PROCEDURE — 77386 HC IMRT TREATMENT DELIVERY, COMPLEX: CPT | Performed by: RADIOLOGY

## 2025-06-27 PROCEDURE — 77014 PR CT GUIDE FOR PLACEMENT RADIATION THERAPY FIELDS: CPT | Mod: 26 | Performed by: RADIOLOGY

## 2025-06-30 PROCEDURE — 77427 RADIATION TX MANAGEMENT X5: CPT | Performed by: RADIOLOGY

## 2025-07-01 ENCOUNTER — APPOINTMENT (OUTPATIENT)
Dept: RADIATION ONCOLOGY | Facility: CLINIC | Age: 65
End: 2025-07-01
Attending: ORTHOPAEDIC SURGERY
Payer: COMMERCIAL

## 2025-07-01 PROCEDURE — 77014 PR CT GUIDE FOR PLACEMENT RADIATION THERAPY FIELDS: CPT | Mod: 26 | Performed by: RADIOLOGY

## 2025-07-01 PROCEDURE — 77336 RADIATION PHYSICS CONSULT: CPT | Performed by: RADIOLOGY

## 2025-07-01 PROCEDURE — 77386 HC IMRT TREATMENT DELIVERY, COMPLEX: CPT | Performed by: RADIOLOGY

## 2025-07-02 ENCOUNTER — APPOINTMENT (OUTPATIENT)
Dept: RADIATION ONCOLOGY | Facility: CLINIC | Age: 65
End: 2025-07-02
Attending: ORTHOPAEDIC SURGERY
Payer: COMMERCIAL

## 2025-07-02 VITALS
OXYGEN SATURATION: 96 % | WEIGHT: 203.3 LBS | SYSTOLIC BLOOD PRESSURE: 136 MMHG | DIASTOLIC BLOOD PRESSURE: 83 MMHG | HEART RATE: 78 BPM | BODY MASS INDEX: 34.9 KG/M2

## 2025-07-02 DIAGNOSIS — C49.9 UNDIFFERENTIATED PLEOMORPHIC SARCOMA (H): Primary | ICD-10-CM

## 2025-07-02 PROCEDURE — 1126F AMNT PAIN NOTED NONE PRSNT: CPT | Performed by: RADIOLOGY

## 2025-07-02 PROCEDURE — 3079F DIAST BP 80-89 MM HG: CPT | Performed by: RADIOLOGY

## 2025-07-02 PROCEDURE — 3075F SYST BP GE 130 - 139MM HG: CPT | Performed by: RADIOLOGY

## 2025-07-02 PROCEDURE — 77386 HC IMRT TREATMENT DELIVERY, COMPLEX: CPT | Performed by: RADIOLOGY

## 2025-07-02 ASSESSMENT — PAIN SCALES - GENERAL: PAINLEVEL_OUTOF10: NO PAIN (0)

## 2025-07-02 NOTE — LETTER
2025      Caty Llamas  [address]  [place] [address]      Dear Colleague,    Thank you for referring your patient, Caty Llamas, to the [facility] RADIATION ONCOLOGY. Please see a copy of my visit note below.    RADIATION ONCOLOGY WEEKLY ON TREATMENT VISIT   Encounter Date: 2025     Patient Name: Caty Llamas  MRN: [id number]  : 1960     Disease and Stage: cT3 N0 M0 undifferentiated pleomorphic sarcoma  Treatment Site: Left thigh  Current Dose/Planned Total Dose: 1200/5000 cGy  Current Fraction/Planned Total fractions:   Concurrent Chemotherapy: Yes  Drug and Frequency: Pembrolizumab, started 2025      ED visits/Hospitalizations:  None    Missed Treatments:  None    Subjective: Ms. Llamas presents to clinic today for her weekly on-treatment visit. She has had increased swelling and pain of her leg with dependence relieved with alleve and elevation. She first noticed progressive fatigue over the weekend following her 5th treatment.    Nursing ROS:   Nutrition Alteration  Diet Type: Patient's Preference  Skin  Skin Reaction: 0 - No changes  Skin Intervention: aquaphor BID  CNS Alteration  CNS note: WNL     Cardiovascular  Respiratory effort: 1 - Normal - without distress        Psychosocial  Mood - Anxiety: 0 - Normal  Mood - Depression: 0 - Normal  Psychosocial Note: Pt and this RN had an open conversation about Pt's emotional state right now and her frustrations surrounding work not showing empathy to her, but instead is adding an extra pressure when she is already feeling a lot of emotions with the current situation.  Pt is planning to take three weeks away from work to help her emotional state.  Pain Assessment  Pain Descriptors: Pressure (After her leg has been in a dependent position for a few hours. 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  Pt also got a desk at work that can be raised and is still waiting on a better chair, but has plans to buy an ottoman type seat to elevate her legs if needed.        Objective:   /83   Pulse 78   Wt 92.2 kg (203 lb 4.8 oz)   LMP  (LMP Unknown)   SpO2 96%   BMI 34.90 kg/m     KPS 90    Pain Score No Pain (0)/10  General: Alert and in no acute distress.  HEENT: Normocephalic, atraumatic. No visible scleral icterus.  Neck: Apparent full range of motion.  CV: Appears well-perfused, with no visible cyanosis.  Lungs: Breathing easily on room air, with no difficulty completing full sentences  Extremities:  No visible edema of the upper extremities. She has bilateral mild L>R lower extremity edema that is stable   Neuro: Alert and oriented; grossly nonfocal. Normal speech. Moving upper and lower extremities equally.  Gait within normal limits.  Skin: No visible jaundice. No suspicious lesions of the visualized integuments.  No skin changes today.  Psych: Mood and affect are appropriate to given situation. Answers questions appropriately.        Treatment-related toxicities (CTCAE v5.0):  Fatigue: Grade 0: No toxicity  Pain: Grade 0: No toxicity  Dermatitis: Grade 0: No toxicity    Assessment:    Ms. Caty Llamas is a 65-year-old woman with a recently diagnosed undifferentiated pleomorphic sarcoma centered at the distal lateral aspect of the left thigh, cT3 (14 cm, abutting the posterior aspect of the femoral cortex without definite involvement of cortical bone, encasing profunda femoris vein), cN0, cM0(PET/CT with mildly enlarged left axillary node that are favored to be reactive), status post biopsy.  She is receiving conventionally fractionated radiotherapy using IG-IMRT to a total dose of 5000 cGy in 25 fractions with concurrent pembrolizumab.      Plan:   Left thigh undifferentiated pleomorphic sarcoma:  Plan and anticipated side effects reviewed on  6/25/2025  Continue radiotherapy    Pain management:  NSAIDs PRN    Dermatitis:  Skin care reviewed.    Tristian Lawler MD PhD PGY2  Department of Radiation Oncology  [facility]  Phone: [phone]      Advanced ICU Care chart and setup information reviewed  IGRT images reviewed         Araceli Huff MD, MPH, PhD     Department of Radiation Oncology  [facility]        Again, thank you for allowing me to participate in the care of your patient.        Sincerely,        Araceli Huff MD    Electronically signed

## 2025-07-02 NOTE — PROGRESS NOTES
RADIATION ONCOLOGY WEEKLY ON TREATMENT VISIT   Encounter Date: 2025     Patient Name: Caty Llamas  MRN: [id number]  : 1960     Disease and Stage: cT3 N0 M0 undifferentiated pleomorphic sarcoma  Treatment Site: Left thigh  Current Dose/Planned Total Dose: 1200/5000 cGy  Current Fraction/Planned Total fractions:   Concurrent Chemotherapy: Yes  Drug and Frequency: Pembrolizumab, started 2025      ED visits/Hospitalizations:  None    Missed Treatments:  None    Subjective: Ms. Llamas presents to clinic today for her weekly on-treatment visit. She has had increased swelling and pain of her leg with dependence relieved with alleve and elevation. She first noticed progressive fatigue over the weekend following her 5th treatment.    Nursing ROS:   Nutrition Alteration  Diet Type: Patient's Preference  Skin  Skin Reaction: 0 - No changes  Skin Intervention: aquaphor BID  CNS Alteration  CNS note: WNL     Cardiovascular  Respiratory effort: 1 - Normal - without distress        Psychosocial  Mood - Anxiety: 0 - Normal  Mood - Depression: 0 - Normal  Psychosocial Note: Pt and this RN had an open conversation about Pt's emotional state right now and her frustrations surrounding work not showing empathy to her, but instead is adding an extra pressure when she is already feeling a lot of emotions with the current situation.  Pt is planning to take three weeks away from work to help her emotional state.  Pain Assessment  Pain Descriptors: Pressure (After her leg has been in a dependent position for a few hours. 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  Pt also got a desk at work that can be raised and is still waiting on a better chair, but has plans to buy an ottoman type seat to elevate her legs if needed.        Objective:   /83   Pulse  78   Wt 92.2 kg (203 lb 4.8 oz)   LMP  (LMP Unknown)   SpO2 96%   BMI 34.90 kg/m     KPS 90    Pain Score No Pain (0)/10  General: Alert and in no acute distress.  HEENT: Normocephalic, atraumatic. No visible scleral icterus.  Neck: Apparent full range of motion.  CV: Appears well-perfused, with no visible cyanosis.  Lungs: Breathing easily on room air, with no difficulty completing full sentences  Extremities:  No visible edema of the upper extremities. She has bilateral mild L>R lower extremity edema that is stable   Neuro: Alert and oriented; grossly nonfocal. Normal speech. Moving upper and lower extremities equally.  Gait within normal limits.  Skin: No visible jaundice. No suspicious lesions of the visualized integuments.  No skin changes today.  Psych: Mood and affect are appropriate to given situation. Answers questions appropriately.        Treatment-related toxicities (CTCAE v5.0):  Fatigue: Grade 0: No toxicity  Pain: Grade 0: No toxicity  Dermatitis: Grade 0: No toxicity    Assessment:    Ms. Caty Llamas is a 65-year-old woman with a recently diagnosed undifferentiated pleomorphic sarcoma centered at the distal lateral aspect of the left thigh, cT3 (14 cm, abutting the posterior aspect of the femoral cortex without definite involvement of cortical bone, encasing profunda femoris vein), cN0, cM0(PET/CT with mildly enlarged left axillary node that are favored to be reactive), status post biopsy.  She is receiving conventionally fractionated radiotherapy using IG-IMRT to a total dose of 5000 cGy in 25 fractions with concurrent pembrolizumab.      Plan:   Left thigh undifferentiated pleomorphic sarcoma:  Plan and anticipated side effects reviewed on 6/25/2025  Continue radiotherapy    Pain management:  NSAIDs PRN    Dermatitis:  Skin care reviewed.    Tristian Lawler MD PhD PGY2  Department of Radiation Oncology  [facility]  Phone: [phone]      [facility] [employer] chart and setup  information reviewed  IGRT images reviewed         Araceli Huff MD, MPH, PhD     Department of Radiation Oncology  [facility]

## 2025-07-03 ENCOUNTER — APPOINTMENT (OUTPATIENT)
Dept: RADIATION ONCOLOGY | Facility: CLINIC | Age: 65
End: 2025-07-03
Attending: ORTHOPAEDIC SURGERY
Payer: COMMERCIAL

## 2025-07-03 PROCEDURE — 77386 HC IMRT TREATMENT DELIVERY, COMPLEX: CPT | Performed by: RADIOLOGY

## 2025-07-03 PROCEDURE — 77014 PR CT GUIDE FOR PLACEMENT RADIATION THERAPY FIELDS: CPT | Mod: 26 | Performed by: STUDENT IN AN ORGANIZED HEALTH CARE EDUCATION/TRAINING PROGRAM

## 2025-07-07 ENCOUNTER — APPOINTMENT (OUTPATIENT)
Dept: RADIATION ONCOLOGY | Facility: CLINIC | Age: 65
End: 2025-07-07
Attending: ORTHOPAEDIC SURGERY
Payer: COMMERCIAL

## 2025-07-07 PROCEDURE — 77014 PR CT GUIDE FOR PLACEMENT RADIATION THERAPY FIELDS: CPT | Mod: 26 | Performed by: RADIOLOGY

## 2025-07-07 PROCEDURE — 77386 HC IMRT TREATMENT DELIVERY, COMPLEX: CPT | Performed by: RADIOLOGY

## 2025-07-08 ENCOUNTER — APPOINTMENT (OUTPATIENT)
Dept: RADIATION ONCOLOGY | Facility: CLINIC | Age: 65
End: 2025-07-08
Attending: ORTHOPAEDIC SURGERY
Payer: COMMERCIAL

## 2025-07-08 PROCEDURE — 77014 PR CT GUIDE FOR PLACEMENT RADIATION THERAPY FIELDS: CPT | Mod: 26 | Performed by: RADIOLOGY

## 2025-07-08 PROCEDURE — 77386 HC IMRT TREATMENT DELIVERY, COMPLEX: CPT | Performed by: RADIOLOGY

## 2025-07-09 ENCOUNTER — APPOINTMENT (OUTPATIENT)
Dept: RADIATION ONCOLOGY | Facility: CLINIC | Age: 65
End: 2025-07-09
Attending: ORTHOPAEDIC SURGERY
Payer: COMMERCIAL

## 2025-07-09 VITALS
OXYGEN SATURATION: 99 % | DIASTOLIC BLOOD PRESSURE: 79 MMHG | WEIGHT: 201.3 LBS | SYSTOLIC BLOOD PRESSURE: 147 MMHG | BODY MASS INDEX: 34.55 KG/M2 | HEART RATE: 72 BPM

## 2025-07-09 DIAGNOSIS — C49.9 UNDIFFERENTIATED PLEOMORPHIC SARCOMA (H): Primary | ICD-10-CM

## 2025-07-09 PROCEDURE — 3078F DIAST BP <80 MM HG: CPT | Performed by: RADIOLOGY

## 2025-07-09 PROCEDURE — 3077F SYST BP >= 140 MM HG: CPT | Performed by: RADIOLOGY

## 2025-07-09 PROCEDURE — 77386 HC IMRT TREATMENT DELIVERY, COMPLEX: CPT | Performed by: RADIOLOGY

## 2025-07-09 PROCEDURE — 77427 RADIATION TX MANAGEMENT X5: CPT | Mod: GC | Performed by: RADIOLOGY

## 2025-07-09 PROCEDURE — 77336 RADIATION PHYSICS CONSULT: CPT | Performed by: RADIOLOGY

## 2025-07-09 PROCEDURE — 1125F AMNT PAIN NOTED PAIN PRSNT: CPT | Performed by: RADIOLOGY

## 2025-07-09 ASSESSMENT — PAIN SCALES - GENERAL: PAINLEVEL_OUTOF10: MODERATE PAIN (6)

## 2025-07-09 NOTE — PROGRESS NOTES
RADIATION ONCOLOGY WEEKLY ON TREATMENT VISIT   Encounter Date: 2025     Patient Name: Caty Llamas  MRN: [id number]  : 1960     Disease and Stage: cT3 N0 M0 undifferentiated pleomorphic sarcoma  Treatment Site: Left thigh  Current Dose/Planned Total Dose: 2000/5000 cGy  Current Fraction/Planned Total fractions: 10/25  Concurrent Chemotherapy: Yes  Drug and Frequency: Pembrolizumab, started 2025      ED visits/Hospitalizations:  None    Missed Treatments:  None; no treatment on 2025    Subjective: Ms. Llamas presents to clinic today for her weekly on-treatment visit. She has had increased swelling and pain of her leg with dependence relieved with alleve and elevation. She first noticed progressive fatigue over the weekend following her 5th treatment.     Nursing ROS:   Nutrition Alteration  Diet Type: Patient's Preference  Skin  Skin Reaction: 0 - No changes  Skin Intervention: aquaphor BID  CNS Alteration  CNS note: WNL     Cardiovascular  Respiratory effort: 1 - Normal - without distress        Psychosocial  Mood - Anxiety: 0 - Normal  Mood - Depression: 0 - Normal  Psychosocial Note: Pt and this RN had an open conversation about Pt's emotional state right now and her frustrations surrounding work not showing empathy to her, but instead is adding an extra pressure when she is already feeling a lot of emotions with the current situation.  Pt is planning to take three weeks away from work to help her emotional state.  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  Pt also got a desk at work that can be raised and is still waiting on a better chair.  Pt states her tumor feels larger and this RN educated Pt on the radiation SE of swelling both at the tumor site and poss. the LE.  Pt is also using aleve sparingly due to  believing it has an affect on her BP and has made her feel vertigo in the past.          Objective:   LMP  (LMP Unknown)    KPS 90    Pain Score Data Unavailable/10  General: Alert and in no acute distress.  HEENT: Normocephalic, atraumatic. No visible scleral icterus.  Neck: Apparent full range of motion.  CV: Appears well-perfused, with no visible cyanosis.  Lungs: Breathing easily on room air, with no difficulty completing full sentences  Extremities:  No visible edema of the upper extremities. She has bilateral mild L>R lower extremity edema that is stable   Neuro: Alert and oriented; grossly nonfocal. Normal speech. Moving upper and lower extremities equally.  Gait within normal limits.  Skin: No visible jaundice. No suspicious lesions of the visualized integuments.  No skin changes today.  Psych: Mood and affect are appropriate to given situation. Answers questions appropriately.        Treatment-related toxicities (CTCAE v5.0):  Fatigue: Grade 0: No toxicity  Pain: Grade 0: No toxicity  Dermatitis: Grade 0: No toxicity    Assessment:    Ms. Caty Llamas is a 65-year-old woman with a recently diagnosed undifferentiated pleomorphic sarcoma centered at the distal lateral aspect of the left thigh, cT3 (14 cm, abutting the posterior aspect of the femoral cortex without definite involvement of cortical bone, encasing profunda femoris vein), cN0, cM0(PET/CT with mildly enlarged left axillary node that are favored to be reactive), status post biopsy.  She is receiving conventionally fractionated radiotherapy using IG-IMRT to a total dose of 5000 cGy in 25 fractions with concurrent pembrolizumab.      Plan:   Left thigh undifferentiated pleomorphic sarcoma:  Plan and anticipated side effects reviewed on 6/25/2025  Continue radiotherapy    Pain management:  NSAIDs PRN    Dermatitis:  Skin care reviewed.    Tristian Lawler MD PhD PGY3  Department of Radiation Oncology  [facility]  Phone:  [phone]      Mosaiq chart and setup information reviewed  IGRT images reviewed         Araceli Huff MD, MPH, PhD     Department of Radiation Oncology  [facility]

## 2025-07-09 NOTE — LETTER
2025      Caty Llamas  [address]  [place] [address]      Dear Colleague,    Thank you for referring your patient, Caty Llamas, to the [facility] RADIATION ONCOLOGY. Please see a copy of my visit note below.    RADIATION ONCOLOGY WEEKLY ON TREATMENT VISIT   Encounter Date: 2025     Patient Name: Caty Llamas  MRN: [id number]  : 1960     Disease and Stage: cT3 N0 M0 undifferentiated pleomorphic sarcoma  Treatment Site: Left thigh  Current Dose/Planned Total Dose: 2000/5000 cGy  Current Fraction/Planned Total fractions: 10/25  Concurrent Chemotherapy: Yes  Drug and Frequency: Pembrolizumab, started 2025      ED visits/Hospitalizations:  None    Missed Treatments:  None; no treatment on 2025    Subjective: Ms. Llamas presents to clinic today for her weekly on-treatment visit. She has had increased swelling and pain of her leg with dependence relieved with alleve and elevation. She first noticed progressive fatigue over the weekend following her 5th treatment.     Nursing ROS:   Nutrition Alteration  Diet Type: Patient's Preference  Skin  Skin Reaction: 0 - No changes  Skin Intervention: aquaphor BID  CNS Alteration  CNS note: WNL     Cardiovascular  Respiratory effort: 1 - Normal - without distress        Psychosocial  Mood - Anxiety: 0 - Normal  Mood - Depression: 0 - Normal  Psychosocial Note: Pt and this RN had an open conversation about Pt's emotional state right now and her frustrations surrounding work not showing empathy to her, but instead is adding an extra pressure when she is already feeling a lot of emotions with the current situation.  Pt is planning to take three weeks away from work to help her emotional state.  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  Pt also got  a desk at work that can be raised and is still waiting on a better chair.  Pt states her tumor feels larger and this RN educated Pt on the radiation SE of swelling both at the tumor site and poss. the LE.  Pt is also using aleve sparingly due to believing it has an affect on her BP and has made her feel vertigo in the past.          Objective:   LMP  (LMP Unknown)    KPS 90    Pain Score Data Unavailable/10  General: Alert and in no acute distress.  HEENT: Normocephalic, atraumatic. No visible scleral icterus.  Neck: Apparent full range of motion.  CV: Appears well-perfused, with no visible cyanosis.  Lungs: Breathing easily on room air, with no difficulty completing full sentences  Extremities:  No visible edema of the upper extremities. She has bilateral mild L>R lower extremity edema that is stable   Neuro: Alert and oriented; grossly nonfocal. Normal speech. Moving upper and lower extremities equally.  Gait within normal limits.  Skin: No visible jaundice. No suspicious lesions of the visualized integuments.  No skin changes today.  Psych: Mood and affect are appropriate to given situation. Answers questions appropriately.        Treatment-related toxicities (CTCAE v5.0):  Fatigue: Grade 0: No toxicity  Pain: Grade 0: No toxicity  Dermatitis: Grade 0: No toxicity    Assessment:    Ms. Caty Llamas is a 65-year-old woman with a recently diagnosed undifferentiated pleomorphic sarcoma centered at the distal lateral aspect of the left thigh, cT3 (14 cm, abutting the posterior aspect of the femoral cortex without definite involvement of cortical bone, encasing profunda femoris vein), cN0, cM0(PET/CT with mildly enlarged left axillary node that are favored to be reactive), status post biopsy.  She is receiving conventionally fractionated radiotherapy using IG-IMRT to a total dose of 5000 cGy in 25 fractions with concurrent pembrolizumab.      Plan:   Left thigh undifferentiated pleomorphic sarcoma:  Plan and  anticipated side effects reviewed on 6/25/2025  Continue radiotherapy    Pain management:  NSAIDs PRN    Dermatitis:  Skin care reviewed.    Tristian Lawler MD PhD PGY3  Department of Radiation Oncology  [facility]  Phone: [phone]      MosaEngiver chart and setup information reviewed  IGRT images reviewed         Araceli Huff MD, MPH, PhD     Department of Radiation Oncology  [facility]        Again, thank you for allowing me to participate in the care of your patient.        Sincerely,        Araceli Huff MD    Electronically signed

## 2025-07-10 ENCOUNTER — CARE COORDINATION (OUTPATIENT)
Dept: ORTHOPEDICS | Facility: CLINIC | Age: 65
End: 2025-07-10
Payer: COMMERCIAL

## 2025-07-10 ENCOUNTER — ALLIED HEALTH/NURSE VISIT (OUTPATIENT)
Dept: RADIATION ONCOLOGY | Facility: CLINIC | Age: 65
End: 2025-07-10
Attending: ORTHOPAEDIC SURGERY
Payer: COMMERCIAL

## 2025-07-10 DIAGNOSIS — C49.9 UNDIFFERENTIATED PLEOMORPHIC SARCOMA (H): Primary | ICD-10-CM

## 2025-07-10 PROCEDURE — 77386 HC IMRT TREATMENT DELIVERY, COMPLEX: CPT | Performed by: RADIOLOGY

## 2025-07-11 ENCOUNTER — APPOINTMENT (OUTPATIENT)
Dept: RADIATION ONCOLOGY | Facility: CLINIC | Age: 65
End: 2025-07-11
Attending: ORTHOPAEDIC SURGERY
Payer: COMMERCIAL

## 2025-07-11 PROCEDURE — 77014 PR CT GUIDE FOR PLACEMENT RADIATION THERAPY FIELDS: CPT | Mod: 26 | Performed by: RADIOLOGY

## 2025-07-11 PROCEDURE — 77386 HC IMRT TREATMENT DELIVERY, COMPLEX: CPT | Performed by: RADIOLOGY

## 2025-07-14 ENCOUNTER — APPOINTMENT (OUTPATIENT)
Dept: LAB | Facility: CLINIC | Age: 65
End: 2025-07-14
Attending: STUDENT IN AN ORGANIZED HEALTH CARE EDUCATION/TRAINING PROGRAM
Payer: COMMERCIAL

## 2025-07-14 ENCOUNTER — INFUSION THERAPY VISIT (OUTPATIENT)
Dept: ONCOLOGY | Facility: CLINIC | Age: 65
End: 2025-07-14
Attending: STUDENT IN AN ORGANIZED HEALTH CARE EDUCATION/TRAINING PROGRAM
Payer: COMMERCIAL

## 2025-07-14 ENCOUNTER — APPOINTMENT (OUTPATIENT)
Dept: RADIATION ONCOLOGY | Facility: CLINIC | Age: 65
End: 2025-07-14
Attending: ORTHOPAEDIC SURGERY
Payer: COMMERCIAL

## 2025-07-14 VITALS
OXYGEN SATURATION: 98 % | TEMPERATURE: 98.2 F | RESPIRATION RATE: 16 BRPM | HEART RATE: 66 BPM | BODY MASS INDEX: 34.47 KG/M2 | SYSTOLIC BLOOD PRESSURE: 125 MMHG | WEIGHT: 200.8 LBS | DIASTOLIC BLOOD PRESSURE: 74 MMHG

## 2025-07-14 DIAGNOSIS — C49.9 SARCOMA OF SOFT TISSUE (H): Primary | ICD-10-CM

## 2025-07-14 LAB
ALBUMIN SERPL BCG-MCNC: 4.2 G/DL (ref 3.5–5.2)
ALP SERPL-CCNC: 76 U/L (ref 40–150)
ALT SERPL W P-5'-P-CCNC: 16 U/L (ref 0–50)
ANION GAP SERPL CALCULATED.3IONS-SCNC: 10 MMOL/L (ref 7–15)
AST SERPL W P-5'-P-CCNC: 20 U/L (ref 0–45)
BASOPHILS # BLD AUTO: 0.1 10E3/UL (ref 0–0.2)
BASOPHILS NFR BLD AUTO: 1 %
BILIRUB SERPL-MCNC: 0.6 MG/DL
BUN SERPL-MCNC: 17.1 MG/DL (ref 8–23)
CALCIUM SERPL-MCNC: 9.5 MG/DL (ref 8.8–10.4)
CHLORIDE SERPL-SCNC: 109 MMOL/L (ref 98–107)
CREAT SERPL-MCNC: 0.82 MG/DL (ref 0.51–0.95)
EGFRCR SERPLBLD CKD-EPI 2021: 78 ML/MIN/1.73M2
EOSINOPHIL # BLD AUTO: 0.4 10E3/UL (ref 0–0.7)
EOSINOPHIL NFR BLD AUTO: 6 %
ERYTHROCYTE [DISTWIDTH] IN BLOOD BY AUTOMATED COUNT: 12.7 % (ref 10–15)
GLUCOSE SERPL-MCNC: 103 MG/DL (ref 70–99)
HCO3 SERPL-SCNC: 24 MMOL/L (ref 22–29)
HCT VFR BLD AUTO: 42.4 % (ref 35–47)
HGB BLD-MCNC: 13.8 G/DL (ref 11.7–15.7)
IMM GRANULOCYTES # BLD: 0 10E3/UL
IMM GRANULOCYTES NFR BLD: 0 %
LYMPHOCYTES # BLD AUTO: 1 10E3/UL (ref 0.8–5.3)
LYMPHOCYTES NFR BLD AUTO: 16 %
MCH RBC QN AUTO: 29.5 PG (ref 26.5–33)
MCHC RBC AUTO-ENTMCNC: 32.5 G/DL (ref 31.5–36.5)
MCV RBC AUTO: 91 FL (ref 78–100)
MONOCYTES # BLD AUTO: 0.7 10E3/UL (ref 0–1.3)
MONOCYTES NFR BLD AUTO: 10 %
NEUTROPHILS # BLD AUTO: 4.4 10E3/UL (ref 1.6–8.3)
NEUTROPHILS NFR BLD AUTO: 68 %
NRBC # BLD AUTO: 0 10E3/UL
NRBC BLD AUTO-RTO: 0 /100
PLATELET # BLD AUTO: 266 10E3/UL (ref 150–450)
POTASSIUM SERPL-SCNC: 4 MMOL/L (ref 3.4–5.3)
PROT SERPL-MCNC: 7.2 G/DL (ref 6.4–8.3)
RBC # BLD AUTO: 4.68 10E6/UL (ref 3.8–5.2)
SODIUM SERPL-SCNC: 143 MMOL/L (ref 135–145)
TSH SERPL DL<=0.005 MIU/L-ACNC: 0.96 UIU/ML (ref 0.3–4.2)
WBC # BLD AUTO: 6.6 10E3/UL (ref 4–11)

## 2025-07-14 PROCEDURE — 258N000003 HC RX IP 258 OP 636: Performed by: STUDENT IN AN ORGANIZED HEALTH CARE EDUCATION/TRAINING PROGRAM

## 2025-07-14 PROCEDURE — 77386 HC IMRT TREATMENT DELIVERY, COMPLEX: CPT | Performed by: RADIOLOGY

## 2025-07-14 PROCEDURE — 84443 ASSAY THYROID STIM HORMONE: CPT | Performed by: STUDENT IN AN ORGANIZED HEALTH CARE EDUCATION/TRAINING PROGRAM

## 2025-07-14 PROCEDURE — 84155 ASSAY OF PROTEIN SERUM: CPT | Performed by: STUDENT IN AN ORGANIZED HEALTH CARE EDUCATION/TRAINING PROGRAM

## 2025-07-14 PROCEDURE — 36415 COLL VENOUS BLD VENIPUNCTURE: CPT | Performed by: STUDENT IN AN ORGANIZED HEALTH CARE EDUCATION/TRAINING PROGRAM

## 2025-07-14 PROCEDURE — 96413 CHEMO IV INFUSION 1 HR: CPT

## 2025-07-14 PROCEDURE — 99214 OFFICE O/P EST MOD 30 MIN: CPT | Performed by: STUDENT IN AN ORGANIZED HEALTH CARE EDUCATION/TRAINING PROGRAM

## 2025-07-14 PROCEDURE — 250N000011 HC RX IP 250 OP 636: Mod: JZ | Performed by: STUDENT IN AN ORGANIZED HEALTH CARE EDUCATION/TRAINING PROGRAM

## 2025-07-14 PROCEDURE — 85004 AUTOMATED DIFF WBC COUNT: CPT

## 2025-07-14 PROCEDURE — G2211 COMPLEX E/M VISIT ADD ON: HCPCS | Performed by: STUDENT IN AN ORGANIZED HEALTH CARE EDUCATION/TRAINING PROGRAM

## 2025-07-14 PROCEDURE — 99213 OFFICE O/P EST LOW 20 MIN: CPT | Mod: 25 | Performed by: STUDENT IN AN ORGANIZED HEALTH CARE EDUCATION/TRAINING PROGRAM

## 2025-07-14 RX ORDER — DIPHENHYDRAMINE HYDROCHLORIDE 50 MG/ML
50 INJECTION, SOLUTION INTRAMUSCULAR; INTRAVENOUS
Status: CANCELLED
Start: 2025-07-14

## 2025-07-14 RX ORDER — DIPHENHYDRAMINE HYDROCHLORIDE 50 MG/ML
25 INJECTION, SOLUTION INTRAMUSCULAR; INTRAVENOUS
Status: CANCELLED
Start: 2025-07-14

## 2025-07-14 RX ORDER — HEPARIN SODIUM (PORCINE) LOCK FLUSH IV SOLN 100 UNIT/ML 100 UNIT/ML
5 SOLUTION INTRAVENOUS
Status: CANCELLED | OUTPATIENT
Start: 2025-07-14

## 2025-07-14 RX ORDER — MEPERIDINE HYDROCHLORIDE 25 MG/ML
25 INJECTION INTRAMUSCULAR; INTRAVENOUS; SUBCUTANEOUS
Status: CANCELLED | OUTPATIENT
Start: 2025-07-14

## 2025-07-14 RX ORDER — HEPARIN SODIUM,PORCINE 10 UNIT/ML
5-20 VIAL (ML) INTRAVENOUS DAILY PRN
Status: CANCELLED | OUTPATIENT
Start: 2025-07-14

## 2025-07-14 RX ORDER — ALBUTEROL SULFATE 90 UG/1
1-2 INHALANT RESPIRATORY (INHALATION)
Status: CANCELLED
Start: 2025-07-14

## 2025-07-14 RX ORDER — EPINEPHRINE 1 MG/ML
0.3 INJECTION, SOLUTION INTRAMUSCULAR; SUBCUTANEOUS EVERY 5 MIN PRN
Status: CANCELLED | OUTPATIENT
Start: 2025-07-14

## 2025-07-14 RX ORDER — METHYLPREDNISOLONE SODIUM SUCCINATE 40 MG/ML
40 INJECTION INTRAMUSCULAR; INTRAVENOUS
Status: CANCELLED
Start: 2025-07-14

## 2025-07-14 RX ORDER — ALBUTEROL SULFATE 0.83 MG/ML
2.5 SOLUTION RESPIRATORY (INHALATION)
Status: CANCELLED | OUTPATIENT
Start: 2025-07-14

## 2025-07-14 RX ORDER — LORAZEPAM 2 MG/ML
0.5 INJECTION INTRAMUSCULAR EVERY 4 HOURS PRN
Status: CANCELLED | OUTPATIENT
Start: 2025-07-14

## 2025-07-14 RX ADMIN — SODIUM CHLORIDE 200 MG: 9 INJECTION, SOLUTION INTRAVENOUS at 10:54

## 2025-07-14 RX ADMIN — SODIUM CHLORIDE 250 ML: 0.9 INJECTION, SOLUTION INTRAVENOUS at 10:52

## 2025-07-14 ASSESSMENT — PAIN SCALES - GENERAL: PAINLEVEL_OUTOF10: NO PAIN (0)

## 2025-07-14 NOTE — NURSING NOTE
"Oncology Rooming Note    July 14, 2025 9:39 AM   Caty Llamas is a 65 year old female who presents for:    Chief Complaint   Patient presents with    Oncology Clinic Visit     Sarcoma of soft tissue    Blood Draw     Vitals, blood drawn and PIV placed by LPN. Pt checked into appt.      Initial Vitals: /74   Pulse 66   Temp 98.2  F (36.8  C) (Oral)   Resp 16   Wt 91.1 kg (200 lb 12.8 oz)   LMP  (LMP Unknown)   SpO2 98%   BMI 34.47 kg/m   Estimated body mass index is 34.47 kg/m  as calculated from the following:    Height as of 6/20/25: 1.626 m (5' 4\").    Weight as of this encounter: 91.1 kg (200 lb 12.8 oz). Body surface area is 2.03 meters squared.  No Pain (0) Comment: Data Unavailable   No LMP recorded (lmp unknown). Patient is postmenopausal.  Allergies reviewed: Yes  Medications reviewed: Yes    Medications: Medication refills not needed today.  Pharmacy name entered into TakeCharge: [id number] [address] [facility]    Frailty Screening:   Is the patient here for a new oncology consult visit in cancer care? 2. No    PHQ9:  Did this patient require a PHQ9?: No      Clinical concerns: none.       Maura Walton LPN            "

## 2025-07-14 NOTE — PROGRESS NOTES
Infusion Nursing Note:  Caty Llamas presents today for Cycle 2 Day 1 Pembrolizumab.    Patient spoke with provider today: Yes: Amber Scheierl NP    Treatment Conditions:  Component      Latest Ref Rng 7/14/2025  9:33 AM   WBC      4.0 - 11.0 10e3/uL 6.6    RBC Count      3.80 - 5.20 10e6/uL 4.68    Hemoglobin      11.7 - 15.7 g/dL 13.8    Hematocrit      35.0 - 47.0 % 42.4    MCV      78 - 100 fL 91    MCH      26.5 - 33.0 pg 29.5    MCHC      31.5 - 36.5 g/dL 32.5    RDW      10.0 - 15.0 % 12.7    Platelet Count      150 - 450 10e3/uL 266    % Neutrophils      % 68    % Lymphocytes      % 16    % Monocytes      % 10    % Eosinophils      % 6    % Basophils      % 1    % Immature Granulocytes      % 0    NRBC/W      <1 /100 0    Absolute Neutrophil      1.6 - 8.3 10e3/uL 4.4    Absolute Lymphocytes      0.8 - 5.3 10e3/uL 1.0    Absolute Monocytes      0.0 - 1.3 10e3/uL 0.7    Absolute Eosinophils      0.0 - 0.7 10e3/uL 0.4    Absolute Basophils      0.0 - 0.2 10e3/uL 0.1    Absolute Immature Granulocytes      <=0.4 10e3/uL 0.0    Absolute NRBCs      10e3/uL 0.0    Sodium      135 - 145 mmol/L 143    Potassium      3.4 - 5.3 mmol/L 4.0    Carbon Dioxide (CO2)      22 - 29 mmol/L 24    Anion Gap      7 - 15 mmol/L 10    Urea Nitrogen      8.0 - 23.0 mg/dL 17.1    Creatinine      0.51 - 0.95 mg/dL 0.82    GFR Estimate      >60 mL/min/1.73m2 78    Calcium      8.8 - 10.4 mg/dL 9.5    Chloride      98 - 107 mmol/L 109 (H)    Glucose      70 - 99 mg/dL 103 (H)    Alkaline Phosphatase      40 - 150 U/L 76    AST      0 - 45 U/L 20    ALT      0 - 50 U/L 16    Protein Total      6.4 - 8.3 g/dL 7.2    Albumin      3.5 - 5.2 g/dL 4.2    Bilirubin Total      <=1.2 mg/dL 0.6       Legend:  (H) High        Note: No concerns at visit today. Pt stated she tolerated her last cycle of Pembrolizumab without many side effects.    Intravenous Access:  Peripheral IV placed.    Post Infusion Assessment:  Patient tolerated infusion  without incident.  Blood return noted pre and post infusion.  Access discontinued per protocol.    Discharge Plan:   Patient declined prescription refills.  Discharge instructions reviewed with: Patient.  Patient and/or family verbalized understanding of discharge instructions and all questions answered.  AVS to patient via [employer].  Patient will return 8/4/25 for next appointment.   Patient discharged in stable condition accompanied by: friend.  Departure Mode: Ambulatory.      Veronica Garcia RN

## 2025-07-14 NOTE — PROGRESS NOTES
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    Her oncology history is as follows:     \"She noticed a rather sudden discomfort in her thigh on March 28, 2025.  She noticed there was some swelling and erythema of the skin which then regressed slightly but remained uncomfortable to pressure mostly when sitting.  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 \"    5/23/25, biopsy  Final Diagnosis   A. Soft tissue mass, left thigh, biopsy:   - High-grade sarcoma, consistent with undifferentiated pleomorphic sarcoma.     Recommendation is for neoadjuvant radiation therapy with concurrent pembrolizumab (Keytruda) 200mg every 21 days.    6/25/25, Starts RT    6/23/25, Cycle 1 Keytruda    Interval History:  Becca presents for cycle 2 Keytruda.  -She feels treatment is going well.  -When off work, she has been able to focus on elevating her leg when she needs to and this helps reduce swelling in her leg and improve pain.  -No skin changes at radiation site at this time. She's been applying aquaphor  -She does have fatigue but makes a goal to accomplish a task each day  -She was tired day of infusion. She otherwise has not noticed any loose stools, rashes, cough or SOB  -Appetite is good  -She feels like the mass encompasses a smaller area of the left thigh now; previously more like grapefruit; now feels like half a lemon    Physical Examination:  /74   Pulse 66   Temp 98.2  F (36.8  C) (Oral)   Resp 16   Wt 91.1 kg (200 lb 12.8 oz)   LMP  (LMP Unknown)   SpO2 98%   BMI 34.47 kg/m    Wt Readings from Last 10 Encounters:   07/14/25 91.1 kg (200 lb 12.8 oz)   07/09/25 91.3 kg (201 lb 4.8 oz)   07/02/25 92.2 kg (203 lb 4.8 oz)   06/25/25 92.3 kg (203 lb " 8 oz)   06/23/25 91.9 kg (202 lb 11.2 oz)   06/20/25 89.4 kg (197 lb)   06/10/25 89.4 kg (197 lb)   06/03/25 93.1 kg (205 lb 3.2 oz)   05/23/25 93.8 kg (206 lb 14.4 oz)   05/12/25 92.1 kg (203 lb)   General: Well-appearing female in no acute distress.  Eyes: EOMI. No scleral icterus or conjunctival injection.  Cardiovascular: RRR No murmurs.   Respiratory: CTA bilaterally. No wheezes or crackles.  MSK: Left lateral thigh mass proximal to knee; firm and nontender to palpation with no overlying skin changes  Neurologic: Cranial nerves II through XII are grossly intact.  Skin: No rashes, petechiae, or bruising noted on exposed skin  Psych: Affect appropriate. Pleasant.     Laboratory Data:  Most Recent 3 CBC's:  Recent Labs   Lab Test 07/14/25  0933 06/23/25  1135   WBC 6.6 8.2   HGB 13.8 14.0   MCV 91 90    269   ANEU 4.4 5.8    Most Recent 3 BMP's:  Recent Labs   Lab Test 07/14/25  0933 06/23/25  1135 06/03/25  0720 05/12/25  0828 01/12/24  1653    141  --  142 141   POTASSIUM 4.0 4.1  --  4.0 3.6   CHLORIDE 109* 106  --  107 104   CO2 24 24  --  26 26   BUN 17.1 17.0  --  16.4 12.6   CR 0.82 0.79  --  0.69 0.71   ANIONGAP 10 11  --  9 11   LYNN 9.5 9.7  --  9.2 9.5   * 101* 103* 107* 105*   PROTTOTAL 7.2 7.4  --   --  7.7   ALBUMIN 4.2 4.2  --   --  4.5    Most Recent 2 LFT's:  Recent Labs   Lab Test 07/14/25  0933 06/23/25  1135   AST 20 22   ALT 16 17   ALKPHOS 76 76   BILITOTAL 0.6 0.6    Most Recent TSH and T4:  Recent Labs   Lab Test 06/23/25  1135   TSH 1.14   TSH pending at time of visit.   I reviewed the above labs today.      Assessment and Plan:    UPS, left thigh  Her PET/CT on 6/3/25 showed no clear evidence of metastatic disease. There were noted to be some mildly enlarged left axillary lymph nodes with low level FDG uptake, thought possibly reactive. This will need to be monitored on follow up imaging.    She began neoadjuvant radiation therapy on 6/25/25. She began concurrent  pembrolizumab (Keytruda) on 6/23/25. She tolerated cycle 1 well.    We will proceed today with cycle 2 today. She will have labs and provider visits every 3 weeks prior to Keytruda.    Her tentative end date for RT is 7/30/25. There is no set surgical date yet but likely at least 1 month later. Depending on surgical date, we can allow for at least 1 week between last neoadjuvant Keytruda and surgery.    She will need chest imaging every 3 months for 2 years followed by every 4 months for 3 years before yearly imaging for another 5.  Local imaging will be per Ortho but probably every 6 months.     Next chest imaging would be due in early September but she was informed that she will have PET for staging and surgical planning MRI prior to surgery which is reasonable. I will reach out to Dr Huff and Dr Hammond to help coordinate.    35 minutes spent on the date of the encounter doing chart review, review of test results, interpretation of tests, patient visit, and documentation     The longitudinal plan of care for the diagnosis(es)/condition(s) as documented were addressed during this visit. Due to the added complexity in care, I will continue to support Becca in the subsequent management and with ongoing continuity of care.     Amber Scheierl, CNP  [facility] [address] [id number] [phone]

## 2025-07-14 NOTE — NURSING NOTE
Chief Complaint   Patient presents with    Oncology Clinic Visit     Sarcoma of soft tissue    Blood Draw     Vitals, blood drawn and PIV placed by LPN. Pt checked into appt.      MONICA Marie LPN

## 2025-07-15 ENCOUNTER — APPOINTMENT (OUTPATIENT)
Dept: RADIATION ONCOLOGY | Facility: CLINIC | Age: 65
End: 2025-07-15
Attending: ORTHOPAEDIC SURGERY
Payer: COMMERCIAL

## 2025-07-15 PROCEDURE — 77386 HC IMRT TREATMENT DELIVERY, COMPLEX: CPT | Performed by: RADIOLOGY

## 2025-07-15 PROCEDURE — 77014 PR CT GUIDE FOR PLACEMENT RADIATION THERAPY FIELDS: CPT | Mod: 26 | Performed by: RADIOLOGY

## 2025-07-16 ENCOUNTER — OFFICE VISIT (OUTPATIENT)
Dept: RADIATION ONCOLOGY | Facility: CLINIC | Age: 65
End: 2025-07-16
Attending: ORTHOPAEDIC SURGERY
Payer: COMMERCIAL

## 2025-07-16 ENCOUNTER — TELEPHONE (OUTPATIENT)
Dept: ORTHOPEDICS | Facility: CLINIC | Age: 65
End: 2025-07-16
Payer: COMMERCIAL

## 2025-07-16 VITALS
WEIGHT: 200.3 LBS | DIASTOLIC BLOOD PRESSURE: 78 MMHG | OXYGEN SATURATION: 98 % | SYSTOLIC BLOOD PRESSURE: 118 MMHG | HEART RATE: 71 BPM | BODY MASS INDEX: 34.38 KG/M2

## 2025-07-16 DIAGNOSIS — C49.9 UNDIFFERENTIATED PLEOMORPHIC SARCOMA (H): Primary | ICD-10-CM

## 2025-07-16 PROCEDURE — 3078F DIAST BP <80 MM HG: CPT | Performed by: RADIOLOGY

## 2025-07-16 PROCEDURE — 3074F SYST BP LT 130 MM HG: CPT | Performed by: RADIOLOGY

## 2025-07-16 PROCEDURE — 77336 RADIATION PHYSICS CONSULT: CPT | Performed by: RADIOLOGY

## 2025-07-16 PROCEDURE — 77386 HC IMRT TREATMENT DELIVERY, COMPLEX: CPT | Performed by: RADIOLOGY

## 2025-07-16 PROCEDURE — 77427 RADIATION TX MANAGEMENT X5: CPT | Mod: GC | Performed by: RADIOLOGY

## 2025-07-16 PROCEDURE — 1126F AMNT PAIN NOTED NONE PRSNT: CPT | Performed by: RADIOLOGY

## 2025-07-16 ASSESSMENT — PAIN SCALES - GENERAL: PAINLEVEL_OUTOF10: NO PAIN (0)

## 2025-07-16 NOTE — PROGRESS NOTES
RADIATION ONCOLOGY WEEKLY ON TREATMENT VISIT   Encounter Date: 2025     Patient Name: Caty Llamas  MRN: [id number]  : 1960     Disease and Stage: cT3 N0 M0 undifferentiated pleomorphic sarcoma  Treatment Site: Left thigh  Current Dose/Planned Total Dose: 3000/5000 cGy  Current Fraction/Planned Total fractions: 15  Concurrent Chemotherapy: Yes  Drug and Frequency: Pembrolizumab, started 2025      ED visits/Hospitalizations:  None    Missed Treatments:  None; no treatment on 2025    Subjective: Ms. Llamas presents to clinic today for her weekly on-treatment visit. She has had increased swelling and pain of her leg with dependence relieved with alleve and elevation. She first noticed progressive fatigue over the weekend following her 5th treatment.  Her symptoms had progressed, so she started taking vacation on Thursday 7/10/2025 with her leave from work starting on 2025.  Today she feels much better with no pain.  Her left leg is more swollen since starting treatment but is better with elevation and ice.  She felt more fatigued following immunotherapy on Monday but feels quite well today.      Nursing ROS:   Nutrition Alteration  Diet Type: Patient's Preference  Skin  Skin Reaction: 1 - Faint erythema or dry desquamation (slightest skin change behind the L knee)  Skin Intervention: aquaphor BID  CNS Alteration  CNS note: WNL     Cardiovascular  Respiratory effort: 1 - Normal - without distress        Psychosocial  Mood - Anxiety: 0 - Normal  Mood - Depression: 0 - Normal  Psychosocial Note: Pt is in a very good place right now mentally with having some time away from work.  Pain Assessment  Pain Descriptors: Pressure  Pain Treatment: Pt is currently having some time off from work and thus she states she has noticed the swelling isn't as prominent and states 0/10 pain today in the treated area.          Objective:   /78 (BP Location: Left arm)   Pulse 71   Wt 90.9 kg  (200 lb 4.8 oz)   LMP  (LMP Unknown)   SpO2 98%   BMI 34.38 kg/m     KPS 90    Pain Score No Pain (0)/10  General: Alert and in no acute distress.  HEENT: Normocephalic, atraumatic. No visible scleral icterus.  Neck: Apparent full range of motion.  CV: Appears well-perfused, with no visible cyanosis.  Lungs: Breathing easily on room air, with no difficulty completing full sentences  Extremities:  No visible edema of the upper extremities. She has bilateral mild L>R lower extremity edema that is improved since last week  Neuro: Alert and oriented; grossly nonfocal. Normal speech. Moving upper and lower extremities equally.  Gait within normal limits.  Skin: No visible jaundice. No suspicious lesions of the visualized integuments.  No skin changes today.  Psych: Mood and affect are appropriate to given situation. Answers questions appropriately.        Treatment-related toxicities (CTCAE v5.0):  Fatigue: Grade 0: No toxicity  Pain: Grade 0: No toxicity  Dermatitis: Grade 0: No toxicity    Assessment:    Ms. Caty Llamas is a 65-year-old woman with a recently diagnosed undifferentiated pleomorphic sarcoma centered at the distal lateral aspect of the left thigh, cT3 (14 cm, abutting the posterior aspect of the femoral cortex without definite involvement of cortical bone, encasing profunda femoris vein), cN0, cM0(PET/CT with mildly enlarged left axillary node that are favored to be reactive), status post biopsy.  She is receiving conventionally fractionated radiotherapy using IG-IMRT to a total dose of 5000 cGy in 25 fractions with concurrent pembrolizumab.      Plan:   Left thigh undifferentiated pleomorphic sarcoma:  Plan and anticipated side effects reviewed on 6/25/2025  Continue radiotherapy    Pain management:  NSAIDs PRN    Dermatitis:  Skin care reviewed.    Tristian Lawler MD PhD PGY3  Department of Radiation Oncology  [facility]  Phone: [phone]      [employer] chart and setup  information reviewed  IGRT images reviewed         Araceli Huff MD, MPH, PhD     Department of Radiation Oncology  [facility]

## 2025-07-16 NOTE — TELEPHONE ENCOUNTER
----- Message from Jose R VILLANUEVA sent at 7/16/2025 12:38 PM CDT -----  Regarding: Images then appt  Hello,     Would you call image scheduling and see if they can get her in before the appointment later in the day?    I have time held Wednesday 8/20 @ 2:!5, but if we can get the images done earlier, there is a 12:!5 spot for an appointment w/ Dr. Hammond    Thank you

## 2025-07-16 NOTE — LETTER
2025      Caty Llamas  [address]  [place] [address]      Dear Colleague,    Thank you for referring your patient, Caty Llamas, to the [facility] RADIATION ONCOLOGY. Please see a copy of my visit note below.    RADIATION ONCOLOGY WEEKLY ON TREATMENT VISIT   Encounter Date: 2025     Patient Name: Caty Llamas  MRN: [id number]  : 1960     Disease and Stage: cT3 N0 M0 undifferentiated pleomorphic sarcoma  Treatment Site: Left thigh  Current Dose/Planned Total Dose: 3000/5000 cGy  Current Fraction/Planned Total fractions: 15/25  Concurrent Chemotherapy: Yes  Drug and Frequency: Pembrolizumab, started 2025      ED visits/Hospitalizations:  None    Missed Treatments:  None; no treatment on 2025    Subjective: Ms. Llamas presents to clinic today for her weekly on-treatment visit. She has had increased swelling and pain of her leg with dependence relieved with alleve and elevation. She first noticed progressive fatigue over the weekend following her 5th treatment.  Her symptoms had progressed, so she started taking vacation on Thursday 7/10/2025 with her leave from work starting on 2025.  Today she feels much better with no pain.  Her left leg is more swollen since starting treatment but is better with elevation and ice.  She felt more fatigued following immunotherapy on Monday but feels quite well today.      Nursing ROS:   Nutrition Alteration  Diet Type: Patient's Preference  Skin  Skin Reaction: 1 - Faint erythema or dry desquamation (slightest skin change behind the L knee)  Skin Intervention: aquaphor BID  CNS Alteration  CNS note: WNL     Cardiovascular  Respiratory effort: 1 - Normal - without distress        Psychosocial  Mood - Anxiety: 0 - Normal  Mood - Depression: 0 - Normal  Psychosocial Note: Pt is in a very good place right now mentally with having some time away from work.  Pain Assessment  Pain Descriptors: Pressure  Pain  Treatment: Pt is currently having some time off from work and thus she states she has noticed the swelling isn't as prominent and states 0/10 pain today in the treated area.          Objective:   /78 (BP Location: Left arm)   Pulse 71   Wt 90.9 kg (200 lb 4.8 oz)   LMP  (LMP Unknown)   SpO2 98%   BMI 34.38 kg/m     KPS 90    Pain Score No Pain (0)/10  General: Alert and in no acute distress.  HEENT: Normocephalic, atraumatic. No visible scleral icterus.  Neck: Apparent full range of motion.  CV: Appears well-perfused, with no visible cyanosis.  Lungs: Breathing easily on room air, with no difficulty completing full sentences  Extremities:  No visible edema of the upper extremities. She has bilateral mild L>R lower extremity edema that is improved since last week  Neuro: Alert and oriented; grossly nonfocal. Normal speech. Moving upper and lower extremities equally.  Gait within normal limits.  Skin: No visible jaundice. No suspicious lesions of the visualized integuments.  No skin changes today.  Psych: Mood and affect are appropriate to given situation. Answers questions appropriately.        Treatment-related toxicities (CTCAE v5.0):  Fatigue: Grade 0: No toxicity  Pain: Grade 0: No toxicity  Dermatitis: Grade 0: No toxicity    Assessment:    Ms. Caty Llamas is a 65-year-old woman with a recently diagnosed undifferentiated pleomorphic sarcoma centered at the distal lateral aspect of the left thigh, cT3 (14 cm, abutting the posterior aspect of the femoral cortex without definite involvement of cortical bone, encasing profunda femoris vein), cN0, cM0(PET/CT with mildly enlarged left axillary node that are favored to be reactive), status post biopsy.  She is receiving conventionally fractionated radiotherapy using IG-IMRT to a total dose of 5000 cGy in 25 fractions with concurrent pembrolizumab.      Plan:   Left thigh undifferentiated pleomorphic sarcoma:  Plan and anticipated side effects reviewed on  6/25/2025  Continue radiotherapy    Pain management:  NSAIDs PRN    Dermatitis:  Skin care reviewed.    Tristian Lawler MD PhD PGY3  Department of Radiation Oncology  [facility]  Phone: [phone]      [employer] chart and setup information reviewed  IGRT images reviewed         Araceli Huff MD, MPH, PhD     Department of Radiation Oncology  [facility] [place] [place]        Again, thank you for allowing me to participate in the care of your patient.        Sincerely,        Araceli Huff MD    Electronically signed

## 2025-07-16 NOTE — TELEPHONE ENCOUNTER
Writer called Pt to see if they had a preferred image center. They prefer to have them taken at the [facility] before appt.  Images scheduling request sent to Clinic coordinators.

## 2025-07-16 NOTE — TELEPHONE ENCOUNTER
Patient confirmed scheduled appointment:  Date: 25  Time: 1:30pm  Visit type: Return MSK Tumor  Provider: Stephany  Location: [facility]  Testing/imagin:00am MRI & CT

## 2025-07-17 ENCOUNTER — APPOINTMENT (OUTPATIENT)
Dept: RADIATION ONCOLOGY | Facility: CLINIC | Age: 65
End: 2025-07-17
Attending: ORTHOPAEDIC SURGERY
Payer: COMMERCIAL

## 2025-07-17 PROCEDURE — 77386 HC IMRT TREATMENT DELIVERY, COMPLEX: CPT | Performed by: RADIOLOGY

## 2025-07-17 PROCEDURE — 77014 PR CT GUIDE FOR PLACEMENT RADIATION THERAPY FIELDS: CPT | Mod: 26 | Performed by: STUDENT IN AN ORGANIZED HEALTH CARE EDUCATION/TRAINING PROGRAM

## 2025-07-18 ENCOUNTER — APPOINTMENT (OUTPATIENT)
Dept: RADIATION ONCOLOGY | Facility: CLINIC | Age: 65
End: 2025-07-18
Attending: ORTHOPAEDIC SURGERY
Payer: COMMERCIAL

## 2025-07-18 PROCEDURE — 77014 PR CT GUIDE FOR PLACEMENT RADIATION THERAPY FIELDS: CPT | Mod: 26 | Performed by: RADIOLOGY

## 2025-07-18 PROCEDURE — 77386 HC IMRT TREATMENT DELIVERY, COMPLEX: CPT | Performed by: RADIOLOGY

## 2025-07-21 ENCOUNTER — APPOINTMENT (OUTPATIENT)
Dept: RADIATION ONCOLOGY | Facility: CLINIC | Age: 65
End: 2025-07-21
Attending: ORTHOPAEDIC SURGERY
Payer: COMMERCIAL

## 2025-07-21 PROCEDURE — 77386 HC IMRT TREATMENT DELIVERY, COMPLEX: CPT | Performed by: RADIOLOGY

## 2025-07-21 PROCEDURE — 77014 PR CT GUIDE FOR PLACEMENT RADIATION THERAPY FIELDS: CPT | Mod: 26 | Performed by: STUDENT IN AN ORGANIZED HEALTH CARE EDUCATION/TRAINING PROGRAM

## 2025-07-22 ENCOUNTER — APPOINTMENT (OUTPATIENT)
Dept: RADIATION ONCOLOGY | Facility: CLINIC | Age: 65
End: 2025-07-22
Attending: ORTHOPAEDIC SURGERY
Payer: COMMERCIAL

## 2025-07-22 PROCEDURE — 77014 PR CT GUIDE FOR PLACEMENT RADIATION THERAPY FIELDS: CPT | Mod: 26 | Performed by: STUDENT IN AN ORGANIZED HEALTH CARE EDUCATION/TRAINING PROGRAM

## 2025-07-22 PROCEDURE — 77386 HC IMRT TREATMENT DELIVERY, COMPLEX: CPT | Performed by: RADIOLOGY

## 2025-07-23 ENCOUNTER — APPOINTMENT (OUTPATIENT)
Dept: RADIATION ONCOLOGY | Facility: CLINIC | Age: 65
End: 2025-07-23
Attending: ORTHOPAEDIC SURGERY
Payer: COMMERCIAL

## 2025-07-23 VITALS
HEART RATE: 68 BPM | BODY MASS INDEX: 34.64 KG/M2 | DIASTOLIC BLOOD PRESSURE: 78 MMHG | SYSTOLIC BLOOD PRESSURE: 123 MMHG | OXYGEN SATURATION: 98 % | WEIGHT: 201.8 LBS

## 2025-07-23 DIAGNOSIS — C49.9 UNDIFFERENTIATED PLEOMORPHIC SARCOMA (H): Primary | ICD-10-CM

## 2025-07-23 PROCEDURE — 77336 RADIATION PHYSICS CONSULT: CPT | Performed by: RADIOLOGY

## 2025-07-23 PROCEDURE — 77427 RADIATION TX MANAGEMENT X5: CPT | Mod: GC | Performed by: RADIOLOGY

## 2025-07-23 PROCEDURE — 77386 HC IMRT TREATMENT DELIVERY, COMPLEX: CPT | Performed by: RADIOLOGY

## 2025-07-23 PROCEDURE — 1126F AMNT PAIN NOTED NONE PRSNT: CPT | Performed by: RADIOLOGY

## 2025-07-23 PROCEDURE — 3074F SYST BP LT 130 MM HG: CPT | Performed by: RADIOLOGY

## 2025-07-23 PROCEDURE — 3078F DIAST BP <80 MM HG: CPT | Performed by: RADIOLOGY

## 2025-07-23 NOTE — LETTER
2025      Caty Llamas  [address]  [place] [address]      Dear Colleague,    Thank you for referring your patient, Caty Llamas, to the [facility] RADIATION ONCOLOGY. Please see a copy of my visit note below.    RADIATION ONCOLOGY WEEKLY ON TREATMENT VISIT   Encounter Date: 2025     Patient Name: Caty Llamas  MRN: [id number]  : 1960     Disease and Stage: cT3 N0 M0 undifferentiated pleomorphic sarcoma  Treatment Site: Left thigh  Current Dose/Planned Total Dose: 4000/5000 cGy  Current Fraction/Planned Total fractions: 20/25  Concurrent Chemotherapy: Yes  Drug and Frequency: Pembrolizumab, started 2025      ED visits/Hospitalizations:  None    Missed Treatments:  None; no treatment on 2025    Subjective: Ms. Llamas presents to clinic today for her weekly on-treatment visit. She has had increased swelling and pain of her leg with dependence relieved with alleve and elevation. She first noticed progressive fatigue over the weekend following her 5th treatment.  Her symptoms had progressed, so she started taking vacation on Thursday 7/10/2025 with her leave from work starting on 2025.  She reports increased skin irritation and swelling today relative to last week but no acute changes.      Nursing ROS:   Nutrition Alteration  Diet Type: Patient's Preference  Skin  Skin Reaction: 1 - Faint erythema or dry desquamation  Skin Intervention: aquaphor BID        Cardiovascular  Respiratory effort: 1 - Normal - without distress        Psychosocial  Mood - Anxiety: 0 - Normal  Mood - Depression: 0 - Normal  Psychosocial Note: Pt is in a very good place right now mentally with having some time away from work and is managing the fatigue by allowing herself time to rest a few hours after radiation when it usually comes on.  Pain Assessment  Pain Descriptors: Pressure  Pain Treatment: Pt is currently having some time off from work and thus she states  she has noticed the swelling isn't as prominent and states 0/10 pain today in the treated area.        Objective:   /78   Pulse 68   Wt 91.5 kg (201 lb 12.8 oz)   LMP  (LMP Unknown)   SpO2 98%   BMI 34.64 kg/m     KPS 90    Pain Score No Pain (0)/10  General: Alert and in no acute distress.  HEENT: Normocephalic, atraumatic. No visible scleral icterus.  Neck: Apparent full range of motion.  CV: Appears well-perfused, with no visible cyanosis.  Lungs: Breathing easily on room air, with no difficulty completing full sentences  Extremities:  No visible edema of the upper extremities. She has bilateral mild L>R lower extremity edema that is increased since last week.  Increased erythema of the left distal thigh posteriorly   Neuro: Alert and oriented; grossly nonfocal. Normal speech. Moving upper and lower extremities equally.  Skin: No visible jaundice. No suspicious lesions of the visualized integuments.  No skin changes today.  Psych: Mood and affect are appropriate to given situation. Answers questions appropriately.        Treatment-related toxicities (CTCAE v5.0):  Fatigue: Grade 0: No toxicity  Pain: Grade 0: No toxicity  Dermatitis: Grade 0: No toxicity    Assessment:    Ms. Caty Llamas is a 65-year-old woman with a recently diagnosed undifferentiated pleomorphic sarcoma centered at the distal lateral aspect of the left thigh, cT3 (14 cm, abutting the posterior aspect of the femoral cortex without definite involvement of cortical bone, encasing profunda femoris vein), cN0, cM0(PET/CT with mildly enlarged left axillary node that are favored to be reactive), status post biopsy.  She is receiving conventionally fractionated radiotherapy using IG-IMRT to a total dose of 5000 cGy in 25 fractions with concurrent pembrolizumab.      Plan:   Left thigh undifferentiated pleomorphic sarcoma:  Plan and anticipated side effects reviewed on 6/25/2025  Continue radiotherapy    Pain management:  NSAIDs  PRN    Dermatitis:  Skin care reviewed.  Recommend Aquaphor twice daily in the setting of increased erythema at the treatment field.    Tristian Lawler MD PhD PGY3  Department of Radiation Oncology  [facility]  Phone: [phone]      [employer] chart and setup information reviewed  IGRT images reviewed         Araceli Huff MD, MPH, PhD     Department of Radiation Oncology  [facility]        Again, thank you for allowing me to participate in the care of your patient.        Sincerely,        Araceli Huff MD    Electronically signed

## 2025-07-23 NOTE — PROGRESS NOTES
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 Llamas presents to clinic today for her weekly on-treatment visit. She has had increased swelling and pain of her leg with dependence relieved with alleve and elevation. She first noticed progressive fatigue over the weekend following her 5th treatment.  Her symptoms had progressed, so she started taking vacation on Thursday 7/10/2025 with her leave from work starting on 2025.  She reports increased skin irritation and swelling today relative to last week but no acute changes.      Nursing ROS:   Nutrition Alteration  Diet Type: Patient's Preference  Skin  Skin Reaction: 1 - Faint erythema or dry desquamation  Skin Intervention: aquaphor BID        Cardiovascular  Respiratory effort: 1 - Normal - without distress        Psychosocial  Mood - Anxiety: 0 - Normal  Mood - Depression: 0 - Normal  Psychosocial Note: Pt is in a very good place right now mentally with having some time away from work and is managing the fatigue by allowing herself time to rest a few hours after radiation when it usually comes on.  Pain Assessment  Pain Descriptors: Pressure  Pain Treatment: Pt is currently having some time off from work and thus she states she has noticed the swelling isn't as prominent and states 0/10 pain today in the treated area.        Objective:   /78   Pulse 68   Wt 91.5 kg (201 lb 12.8 oz)   LMP  (LMP Unknown)   SpO2 98%   BMI 34.64 kg/m     KPS 90    Pain Score No Pain  (0)/10  General: Alert and in no acute distress.  HEENT: Normocephalic, atraumatic. No visible scleral icterus.  Neck: Apparent full range of motion.  CV: Appears well-perfused, with no visible cyanosis.  Lungs: Breathing easily on room air, with no difficulty completing full sentences  Extremities:  No visible edema of the upper extremities. She has bilateral mild L>R lower extremity edema that is increased since last week.  Increased erythema of the left distal thigh posteriorly   Neuro: Alert and oriented; grossly nonfocal. Normal speech. Moving upper and lower extremities equally.  Skin: No visible jaundice. No suspicious lesions of the visualized integuments.  No skin changes today.  Psych: Mood and affect are appropriate to given situation. Answers questions appropriately.        Treatment-related toxicities (CTCAE v5.0):  Fatigue: Grade 0: No toxicity  Pain: Grade 0: No toxicity  Dermatitis: Grade 0: No toxicity    Assessment:    Ms. Caty Llamas is a 65-year-old woman with a recently diagnosed undifferentiated pleomorphic sarcoma centered at the distal lateral aspect of the left thigh, cT3 (14 cm, abutting the posterior aspect of the femoral cortex without definite involvement of cortical bone, encasing profunda femoris vein), cN0, cM0(PET/CT with mildly enlarged left axillary node that are favored to be reactive), status post biopsy.  She is receiving conventionally fractionated radiotherapy using IG-IMRT to a total dose of 5000 cGy in 25 fractions with concurrent pembrolizumab.      Plan:   Left thigh undifferentiated pleomorphic sarcoma:  Plan and anticipated side effects reviewed on 6/25/2025  Continue radiotherapy    Pain management:  NSAIDs PRN    Dermatitis:  Skin care reviewed.  Recommend Aquaphor twice daily in the setting of increased erythema at the treatment field.    Tristian Lawler MD PhD PGY3  Department of Radiation Oncology  [facility]  Phone:  [phone]      Mosaiq chart and setup information reviewed  IGRT images reviewed         Araceli Huff MD, MPH, PhD     Department of Radiation Oncology  [facility]

## 2025-07-24 ENCOUNTER — APPOINTMENT (OUTPATIENT)
Dept: RADIATION ONCOLOGY | Facility: CLINIC | Age: 65
End: 2025-07-24
Attending: ORTHOPAEDIC SURGERY
Payer: COMMERCIAL

## 2025-07-24 PROCEDURE — 77014 PR CT GUIDE FOR PLACEMENT RADIATION THERAPY FIELDS: CPT | Mod: 26 | Performed by: RADIOLOGY

## 2025-07-24 PROCEDURE — 77386 HC IMRT TREATMENT DELIVERY, COMPLEX: CPT | Performed by: RADIOLOGY

## 2025-07-25 ENCOUNTER — APPOINTMENT (OUTPATIENT)
Dept: RADIATION ONCOLOGY | Facility: CLINIC | Age: 65
End: 2025-07-25
Attending: ORTHOPAEDIC SURGERY
Payer: COMMERCIAL

## 2025-07-25 PROCEDURE — 77014 PR CT GUIDE FOR PLACEMENT RADIATION THERAPY FIELDS: CPT | Mod: 26 | Performed by: STUDENT IN AN ORGANIZED HEALTH CARE EDUCATION/TRAINING PROGRAM

## 2025-07-25 PROCEDURE — 77386 HC IMRT TREATMENT DELIVERY, COMPLEX: CPT | Performed by: RADIOLOGY

## 2025-07-28 ENCOUNTER — APPOINTMENT (OUTPATIENT)
Dept: RADIATION ONCOLOGY | Facility: CLINIC | Age: 65
End: 2025-07-28
Attending: ORTHOPAEDIC SURGERY
Payer: COMMERCIAL

## 2025-07-28 ENCOUNTER — DOCUMENTATION ONLY (OUTPATIENT)
Dept: RADIATION ONCOLOGY | Facility: CLINIC | Age: 65
End: 2025-07-28
Payer: COMMERCIAL

## 2025-07-28 PROCEDURE — 77386 HC IMRT TREATMENT DELIVERY, COMPLEX: CPT | Performed by: RADIOLOGY

## 2025-07-28 PROCEDURE — 77014 PR CT GUIDE FOR PLACEMENT RADIATION THERAPY FIELDS: CPT | Mod: 26 | Performed by: RADIOLOGY

## 2025-07-28 NOTE — NURSING NOTE
Patient presents to clinic today for radiation treatment. Dx UPS of posterior L thigh, day , dose 4600cGy/5000cGy, being treated by Dr. Huff. Patient requests to be evaluated prior to treatment regarding new onset skin blisters. Per patient, the blisters appeared over the weekend. They are not itchy or painful. Patient states they appear a bit bigger today compared to yesterday. This RN assessed patient's skin. The skin in the area of treatment has a dark discoloration. Skin is intact. There are several very small blisters that are intact (not ruptured, no drainage). Patient is using Aquaphor BID. Patient would like to ensure that it is okay for her to proceed with treatment today, and would like to know if she needs to make any changes to her skin care regimen. Patient denies any other questions/concerns at this time. Report given to Dr. Lawler for further evaluation.       GILBERT ChuN, RN  Radiation Oncology    [address] [address]    Contact  Clinic : [phone]  Clinic Nurse Desk: [phone]  Radiation Therapists/[phone]  After Hours (On-Call): [phone]

## 2025-07-29 ENCOUNTER — APPOINTMENT (OUTPATIENT)
Dept: RADIATION ONCOLOGY | Facility: CLINIC | Age: 65
End: 2025-07-29
Attending: ORTHOPAEDIC SURGERY
Payer: COMMERCIAL

## 2025-07-29 PROCEDURE — 77386 HC IMRT TREATMENT DELIVERY, COMPLEX: CPT | Performed by: RADIOLOGY

## 2025-07-31 ENCOUNTER — OFFICE VISIT (OUTPATIENT)
Dept: RADIATION ONCOLOGY | Facility: CLINIC | Age: 65
End: 2025-07-31
Attending: ORTHOPAEDIC SURGERY
Payer: COMMERCIAL

## 2025-07-31 DIAGNOSIS — C49.9 UNDIFFERENTIATED PLEOMORPHIC SARCOMA (H): Primary | ICD-10-CM

## 2025-07-31 PROCEDURE — 77427 RADIATION TX MANAGEMENT X5: CPT | Performed by: RADIOLOGY

## 2025-07-31 PROCEDURE — 77386 HC IMRT TREATMENT DELIVERY, COMPLEX: CPT | Performed by: RADIOLOGY

## 2025-07-31 PROCEDURE — 77336 RADIATION PHYSICS CONSULT: CPT | Performed by: RADIOLOGY

## 2025-07-31 NOTE — LETTER
2025      Caty Llamas  [address]  [place] [address]      Dear Colleague,    Thank you for referring your patient, Caty Llamas, to the [facility] RADIATION ONCOLOGY. Please see a copy of my visit note below.    RADIATION ONCOLOGY WEEKLY ON TREATMENT VISIT   Encounter Date: 2025     Patient Name: Caty Llamas  MRN: [id number]  : 1960     Disease and Stage: cT3 N0 M0 undifferentiated pleomorphic sarcoma  Treatment Site: Left thigh  Current Dose/Planned Total Dose: 5000/5000 cGy  Current Fraction/Planned Total fractions: 25/25  Concurrent Chemotherapy: Yes  Drug and Frequency: Pembrolizumab, started 2025      ED visits/Hospitalizations:  None    Missed Treatments:  None; no treatment on 2025 and 2025    Subjective: Ms. Llamas presents to clinic today for her weekly on-treatment visit.  Her left distal leg swelling is decreased today, but her erythema and hyperpigmentation within the treatment field have increased.  This is not bothering her with regard to pain.  She is using frequent Aquaphor.  She notices worsening fatigue over the course of treatment, taking a 4-hour nap yesterday.  However she is able to awake rested and do her activities.  She plans to have dinner with her partner in [place], [place], to celebrate for completing radiotherapy.    Objective:   LMP  (LMP Unknown)    KPS 90    Pain Score 0/10  General: Alert and in no acute distress.  HEENT: Normocephalic, atraumatic. No visible scleral icterus.  Neck: Apparent full range of motion.  CV: Appears well-perfused, with no visible cyanosis.  Lungs: Breathing easily on room air, with no difficulty completing full sentences  Extremities:  No visible edema of the upper extremities. She has bilateral mild L>R lower extremity edema that is increased since last week.  Increased erythema of the left distal thigh posteriorly. Trace blisters of the left distal lateral thigh.  Neuro: Alert and  oriented; grossly nonfocal. Normal speech. Moving upper and lower extremities equally.  Skin: No visible jaundice. No suspicious lesions of the visualized integuments.  Grade 2 erythema and hyperpigmentation within the treatment field with very small area (a few millimeters) of open skin laterally  Psych: Mood and affect are appropriate to given situation. Answers questions appropriately.        OnTreatment-related toxicities (CTCAE v5.0):  Fatigue: Grade 1: Fatigue relieved by rest  Pain: Grade 0: No toxicity  Dermatitis: Grade 2: Moderate to brisk erythema; patchy moist desquamation, mostly confined to skin folds and creases; moderate erythema      Assessment:    Ms. Caty Llamas is a 65-year-old woman with a recently diagnosed undifferentiated pleomorphic sarcoma centered at the distal lateral aspect of the left thigh, cT3 (14 cm, abutting the posterior aspect of the femoral cortex without definite involvement of cortical bone, encasing profunda femoris vein), cN0, cM0(PET/CT with mildly enlarged left axillary node that are favored to be reactive), status post biopsy.  She is receiving conventionally fractionated radiotherapy using IG-IMRT to a total dose of 5000 cGy in 25 fractions with concurrent pembrolizumab.      Plan:   Left thigh undifferentiated pleomorphic sarcoma:  Plan and anticipated side effects reviewed on 6/25/2025  Treatment complete today    Pain management:  NSAIDs PRN    Dermatitis:  Skin care reviewed.  Recommend Aquaphor twice daily in the setting of increased erythema and hyperpigmentation at the treatment field until symptoms improve (likely 1-2 weeks post-treatment) at which time she return to a healthy baseline. Mepilex provided that week didn't hold well, and the small extent to skin opening at present is likely manageable with Aquaphor at present.  We asked her to contact us in the coming 1 to 2 weeks should skin reaction increase such that she have other areas of open skin,  increasing pain, or if other concerns arise.  She expressed understanding to have a low threshold to contact us regarding her skin or any other new symptoms.    Follow up:  Medical oncology: 8/4/2025 Amber Scheierl prior to next pembrolizumab infusion  Orthopedic oncology: 8/22/2025 Dr. Hammond  Radiation oncology: On an as-needed basis; we are happy to be part of her alternating follow-up/surveillance should the other teams wishes to do so      Mosaiq chart and setup information reviewed  IGRT images reviewed       Araceli Huff MD, MPH, PhD     Department of Radiation Oncology  [facility]        Again, thank you for allowing me to participate in the care of your patient.        Sincerely,        Araceli Huff MD    Electronically signed

## 2025-07-31 NOTE — PROGRESS NOTES
RADIATION ONCOLOGY WEEKLY ON TREATMENT VISIT   Encounter Date: 2025     Patient Name: Caty Llamas  MRN: [id number]  : 1960     Disease and Stage: cT3 N0 M0 undifferentiated pleomorphic sarcoma  Treatment Site: Left thigh  Current Dose/Planned Total Dose: 5000/5000 cGy  Current Fraction/Planned Total fractions: 25/25  Concurrent Chemotherapy: Yes  Drug and Frequency: Pembrolizumab, started 2025      ED visits/Hospitalizations:  None    Missed Treatments:  None; no treatment on 2025 and 2025    Subjective: Ms. Llamas presents to clinic today for her weekly on-treatment visit.  Her left distal leg swelling is decreased today, but her erythema and hyperpigmentation within the treatment field have increased.  This is not bothering her with regard to pain.  She is using frequent Aquaphor.  She notices worsening fatigue over the course of treatment, taking a 4-hour nap yesterday.  However she is able to awake rested and do her activities.  She plans to have dinner with her partner in [place], [place], to celebrate for completing radiotherapy.    Objective:   LMP  (LMP Unknown)    KPS 90    Pain Score 0/10  General: Alert and in no acute distress.  HEENT: Normocephalic, atraumatic. No visible scleral icterus.  Neck: Apparent full range of motion.  CV: Appears well-perfused, with no visible cyanosis.  Lungs: Breathing easily on room air, with no difficulty completing full sentences  Extremities:  No visible edema of the upper extremities. She has bilateral mild L>R lower extremity edema that is increased since last week.  Increased erythema of the left distal thigh posteriorly. Trace blisters of the left distal lateral thigh.  Neuro: Alert and oriented; grossly nonfocal. Normal speech. Moving upper and lower extremities equally.  Skin: No visible jaundice. No suspicious lesions of the visualized integuments.  Grade 2 erythema and hyperpigmentation within the treatment field with very small  area (a few millimeters) of open skin laterally  Psych: Mood and affect are appropriate to given situation. Answers questions appropriately.        OnTreatment-related toxicities (CTCAE v5.0):  Fatigue: Grade 1: Fatigue relieved by rest  Pain: Grade 0: No toxicity  Dermatitis: Grade 2: Moderate to brisk erythema; patchy moist desquamation, mostly confined to skin folds and creases; moderate erythema      Assessment:    Ms. Caty Llamas is a 65-year-old woman with a recently diagnosed undifferentiated pleomorphic sarcoma centered at the distal lateral aspect of the left thigh, cT3 (14 cm, abutting the posterior aspect of the femoral cortex without definite involvement of cortical bone, encasing profunda femoris vein), cN0, cM0(PET/CT with mildly enlarged left axillary node that are favored to be reactive), status post biopsy.  She is receiving conventionally fractionated radiotherapy using IG-IMRT to a total dose of 5000 cGy in 25 fractions with concurrent pembrolizumab.      Plan:   Left thigh undifferentiated pleomorphic sarcoma:  Plan and anticipated side effects reviewed on 6/25/2025  Treatment complete today    Pain management:  NSAIDs PRN    Dermatitis:  Skin care reviewed.  Recommend Aquaphor twice daily in the setting of increased erythema and hyperpigmentation at the treatment field until symptoms improve (likely 1-2 weeks post-treatment) at which time she return to a healthy baseline. Mepilex provided that week didn't hold well, and the small extent to skin opening at present is likely manageable with Aquaphor at present.  We asked her to contact us in the coming 1 to 2 weeks should skin reaction increase such that she have other areas of open skin, increasing pain, or if other concerns arise.  She expressed understanding to have a low threshold to contact us regarding her skin or any other new symptoms.    Follow up:  Medical oncology: 8/4/2025 Amber Scheierl prior to next pembrolizumab  infusion  Orthopedic oncology: 8/22/2025 Dr. Hammond  Radiation oncology: On an as-needed basis; we are happy to be part of her alternating follow-up/surveillance should the other teams wishes to do so      Mosaiq chart and setup information reviewed  IGRT images reviewed       Araceli Huff MD, MPH, PhD     Department of Radiation Oncology  [facility]

## 2025-08-04 ENCOUNTER — INFUSION THERAPY VISIT (OUTPATIENT)
Dept: ONCOLOGY | Facility: CLINIC | Age: 65
End: 2025-08-04
Attending: STUDENT IN AN ORGANIZED HEALTH CARE EDUCATION/TRAINING PROGRAM
Payer: COMMERCIAL

## 2025-08-04 VITALS
TEMPERATURE: 99.5 F | WEIGHT: 203.5 LBS | OXYGEN SATURATION: 98 % | BODY MASS INDEX: 34.93 KG/M2 | HEART RATE: 83 BPM | DIASTOLIC BLOOD PRESSURE: 78 MMHG | SYSTOLIC BLOOD PRESSURE: 143 MMHG | RESPIRATION RATE: 18 BRPM

## 2025-08-04 DIAGNOSIS — C49.9 SARCOMA OF SOFT TISSUE (H): Primary | ICD-10-CM

## 2025-08-04 DIAGNOSIS — C49.9 UNDIFFERENTIATED PLEOMORPHIC SARCOMA (H): Primary | ICD-10-CM

## 2025-08-04 LAB
ALBUMIN SERPL BCG-MCNC: 4.1 G/DL (ref 3.5–5.2)
ALP SERPL-CCNC: 74 U/L (ref 40–150)
ALT SERPL W P-5'-P-CCNC: 15 U/L (ref 0–50)
ANION GAP SERPL CALCULATED.3IONS-SCNC: 11 MMOL/L (ref 7–15)
AST SERPL W P-5'-P-CCNC: 21 U/L (ref 0–45)
BASOPHILS # BLD AUTO: 0.1 10E3/UL (ref 0–0.2)
BASOPHILS NFR BLD AUTO: 1 %
BILIRUB SERPL-MCNC: 0.3 MG/DL
BUN SERPL-MCNC: 18.3 MG/DL (ref 8–23)
CALCIUM SERPL-MCNC: 9.5 MG/DL (ref 8.8–10.4)
CHLORIDE SERPL-SCNC: 109 MMOL/L (ref 98–107)
CREAT SERPL-MCNC: 0.72 MG/DL (ref 0.51–0.95)
EGFRCR SERPLBLD CKD-EPI 2021: >90 ML/MIN/1.73M2
EOSINOPHIL # BLD AUTO: 0.3 10E3/UL (ref 0–0.7)
EOSINOPHIL NFR BLD AUTO: 4 %
ERYTHROCYTE [DISTWIDTH] IN BLOOD BY AUTOMATED COUNT: 13 % (ref 10–15)
GLUCOSE SERPL-MCNC: 113 MG/DL (ref 70–99)
HCO3 SERPL-SCNC: 23 MMOL/L (ref 22–29)
HCT VFR BLD AUTO: 41.4 % (ref 35–47)
HGB BLD-MCNC: 13.5 G/DL (ref 11.7–15.7)
IMM GRANULOCYTES # BLD: 0 10E3/UL
IMM GRANULOCYTES NFR BLD: 0 %
LYMPHOCYTES # BLD AUTO: 1.1 10E3/UL (ref 0.8–5.3)
LYMPHOCYTES NFR BLD AUTO: 15 %
MCH RBC QN AUTO: 29.6 PG (ref 26.5–33)
MCHC RBC AUTO-ENTMCNC: 32.6 G/DL (ref 31.5–36.5)
MCV RBC AUTO: 91 FL (ref 78–100)
MONOCYTES # BLD AUTO: 0.7 10E3/UL (ref 0–1.3)
MONOCYTES NFR BLD AUTO: 10 %
NEUTROPHILS # BLD AUTO: 5.2 10E3/UL (ref 1.6–8.3)
NEUTROPHILS NFR BLD AUTO: 70 %
NRBC # BLD AUTO: 0 10E3/UL
NRBC BLD AUTO-RTO: 0 /100
PLATELET # BLD AUTO: 278 10E3/UL (ref 150–450)
POTASSIUM SERPL-SCNC: 3.7 MMOL/L (ref 3.4–5.3)
PROT SERPL-MCNC: 7.2 G/DL (ref 6.4–8.3)
RBC # BLD AUTO: 4.56 10E6/UL (ref 3.8–5.2)
SODIUM SERPL-SCNC: 143 MMOL/L (ref 135–145)
TSH SERPL DL<=0.005 MIU/L-ACNC: 0.85 UIU/ML (ref 0.3–4.2)
WBC # BLD AUTO: 7.4 10E3/UL (ref 4–11)

## 2025-08-04 PROCEDURE — G2211 COMPLEX E/M VISIT ADD ON: HCPCS | Performed by: STUDENT IN AN ORGANIZED HEALTH CARE EDUCATION/TRAINING PROGRAM

## 2025-08-04 PROCEDURE — 36415 COLL VENOUS BLD VENIPUNCTURE: CPT | Performed by: STUDENT IN AN ORGANIZED HEALTH CARE EDUCATION/TRAINING PROGRAM

## 2025-08-04 PROCEDURE — 258N000003 HC RX IP 258 OP 636: Performed by: STUDENT IN AN ORGANIZED HEALTH CARE EDUCATION/TRAINING PROGRAM

## 2025-08-04 PROCEDURE — 85025 COMPLETE CBC W/AUTO DIFF WBC: CPT

## 2025-08-04 PROCEDURE — 99213 OFFICE O/P EST LOW 20 MIN: CPT | Performed by: STUDENT IN AN ORGANIZED HEALTH CARE EDUCATION/TRAINING PROGRAM

## 2025-08-04 PROCEDURE — 250N000011 HC RX IP 250 OP 636: Mod: JZ | Performed by: STUDENT IN AN ORGANIZED HEALTH CARE EDUCATION/TRAINING PROGRAM

## 2025-08-04 PROCEDURE — 96413 CHEMO IV INFUSION 1 HR: CPT

## 2025-08-04 PROCEDURE — 84155 ASSAY OF PROTEIN SERUM: CPT | Performed by: STUDENT IN AN ORGANIZED HEALTH CARE EDUCATION/TRAINING PROGRAM

## 2025-08-04 PROCEDURE — 99213 OFFICE O/P EST LOW 20 MIN: CPT | Mod: 25 | Performed by: STUDENT IN AN ORGANIZED HEALTH CARE EDUCATION/TRAINING PROGRAM

## 2025-08-04 PROCEDURE — 84443 ASSAY THYROID STIM HORMONE: CPT | Performed by: STUDENT IN AN ORGANIZED HEALTH CARE EDUCATION/TRAINING PROGRAM

## 2025-08-04 RX ORDER — ALBUTEROL SULFATE 90 UG/1
1-2 INHALANT RESPIRATORY (INHALATION)
Status: CANCELLED
Start: 2025-08-04

## 2025-08-04 RX ORDER — DIPHENHYDRAMINE HYDROCHLORIDE 50 MG/ML
25 INJECTION, SOLUTION INTRAMUSCULAR; INTRAVENOUS
Status: CANCELLED
Start: 2025-08-04

## 2025-08-04 RX ORDER — METHYLPREDNISOLONE SODIUM SUCCINATE 40 MG/ML
40 INJECTION INTRAMUSCULAR; INTRAVENOUS
Status: CANCELLED
Start: 2025-08-04

## 2025-08-04 RX ORDER — EPINEPHRINE 1 MG/ML
0.3 INJECTION, SOLUTION INTRAMUSCULAR; SUBCUTANEOUS EVERY 5 MIN PRN
Status: CANCELLED | OUTPATIENT
Start: 2025-08-04

## 2025-08-04 RX ORDER — MEPERIDINE HYDROCHLORIDE 25 MG/ML
25 INJECTION INTRAMUSCULAR; INTRAVENOUS; SUBCUTANEOUS
Status: CANCELLED | OUTPATIENT
Start: 2025-08-04

## 2025-08-04 RX ORDER — HEPARIN SODIUM (PORCINE) LOCK FLUSH IV SOLN 100 UNIT/ML 100 UNIT/ML
5 SOLUTION INTRAVENOUS
Status: CANCELLED | OUTPATIENT
Start: 2025-08-04

## 2025-08-04 RX ORDER — DIPHENHYDRAMINE HYDROCHLORIDE 50 MG/ML
50 INJECTION, SOLUTION INTRAMUSCULAR; INTRAVENOUS
Status: CANCELLED
Start: 2025-08-04

## 2025-08-04 RX ORDER — LORAZEPAM 2 MG/ML
0.5 INJECTION INTRAMUSCULAR EVERY 4 HOURS PRN
Status: CANCELLED | OUTPATIENT
Start: 2025-08-04

## 2025-08-04 RX ORDER — ALBUTEROL SULFATE 0.83 MG/ML
2.5 SOLUTION RESPIRATORY (INHALATION)
Status: CANCELLED | OUTPATIENT
Start: 2025-08-04

## 2025-08-04 RX ORDER — HEPARIN SODIUM,PORCINE 10 UNIT/ML
5-20 VIAL (ML) INTRAVENOUS DAILY PRN
Status: CANCELLED | OUTPATIENT
Start: 2025-08-04

## 2025-08-04 RX ADMIN — SODIUM CHLORIDE 200 MG: 9 INJECTION, SOLUTION INTRAVENOUS at 15:04

## 2025-08-04 RX ADMIN — SODIUM CHLORIDE 250 ML: 0.9 INJECTION, SOLUTION INTRAVENOUS at 15:04

## 2025-08-04 ASSESSMENT — PAIN SCALES - GENERAL: PAINLEVEL_OUTOF10: NO PAIN (0)

## 2025-08-22 ENCOUNTER — ANCILLARY PROCEDURE (OUTPATIENT)
Dept: CT IMAGING | Facility: CLINIC | Age: 65
End: 2025-08-22
Attending: ORTHOPAEDIC SURGERY
Payer: COMMERCIAL

## 2025-08-22 ENCOUNTER — ANCILLARY PROCEDURE (OUTPATIENT)
Dept: MRI IMAGING | Facility: CLINIC | Age: 65
End: 2025-08-22
Attending: ORTHOPAEDIC SURGERY
Payer: COMMERCIAL

## 2025-08-22 DIAGNOSIS — C49.9 UNDIFFERENTIATED PLEOMORPHIC SARCOMA (H): ICD-10-CM

## 2025-08-22 PROCEDURE — 71250 CT THORAX DX C-: CPT | Mod: GC | Performed by: RADIOLOGY

## 2025-08-22 PROCEDURE — A9585 GADOBUTROL INJECTION: HCPCS | Mod: JZ | Performed by: RADIOLOGY

## 2025-08-22 PROCEDURE — 73720 MRI LWR EXTREMITY W/O&W/DYE: CPT | Mod: LT | Performed by: RADIOLOGY

## 2025-08-22 RX ORDER — GADOBUTROL 604.72 MG/ML
10 INJECTION INTRAVENOUS ONCE
Status: COMPLETED | OUTPATIENT
Start: 2025-08-22 | End: 2025-08-22

## 2025-08-22 RX ADMIN — GADOBUTROL 10 ML: 604.72 INJECTION INTRAVENOUS at 10:43

## 2025-08-25 ENCOUNTER — INFUSION THERAPY VISIT (OUTPATIENT)
Dept: ONCOLOGY | Facility: CLINIC | Age: 65
End: 2025-08-25
Attending: STUDENT IN AN ORGANIZED HEALTH CARE EDUCATION/TRAINING PROGRAM
Payer: COMMERCIAL

## 2025-08-25 VITALS
SYSTOLIC BLOOD PRESSURE: 123 MMHG | DIASTOLIC BLOOD PRESSURE: 57 MMHG | RESPIRATION RATE: 18 BRPM | BODY MASS INDEX: 37.01 KG/M2 | OXYGEN SATURATION: 99 % | TEMPERATURE: 97.9 F | WEIGHT: 201.1 LBS | HEART RATE: 61 BPM

## 2025-08-25 DIAGNOSIS — C49.9 SARCOMA OF SOFT TISSUE (H): Primary | ICD-10-CM

## 2025-08-25 LAB
ALBUMIN SERPL BCG-MCNC: 4.2 G/DL (ref 3.5–5.2)
ALP SERPL-CCNC: 78 U/L (ref 40–150)
ALT SERPL W P-5'-P-CCNC: 16 U/L (ref 0–50)
ANION GAP SERPL CALCULATED.3IONS-SCNC: 14 MMOL/L (ref 7–15)
AST SERPL W P-5'-P-CCNC: 26 U/L (ref 0–45)
BASOPHILS # BLD AUTO: 0.07 10E3/UL (ref 0–0.2)
BASOPHILS NFR BLD AUTO: 0.9 %
BILIRUB SERPL-MCNC: 0.6 MG/DL
BUN SERPL-MCNC: 18.2 MG/DL (ref 8–23)
CALCIUM SERPL-MCNC: 9.5 MG/DL (ref 8.8–10.4)
CHLORIDE SERPL-SCNC: 103 MMOL/L (ref 98–107)
CREAT SERPL-MCNC: 0.82 MG/DL (ref 0.51–0.95)
EGFRCR SERPLBLD CKD-EPI 2021: 78 ML/MIN/1.73M2
EOSINOPHIL # BLD AUTO: 0.31 10E3/UL (ref 0–0.7)
EOSINOPHIL NFR BLD AUTO: 3.9 %
ERYTHROCYTE [DISTWIDTH] IN BLOOD BY AUTOMATED COUNT: 12.6 % (ref 10–15)
GLUCOSE SERPL-MCNC: 97 MG/DL (ref 70–99)
HCO3 SERPL-SCNC: 22 MMOL/L (ref 22–29)
HCT VFR BLD AUTO: 43 % (ref 35–47)
HGB BLD-MCNC: 14.2 G/DL (ref 11.7–15.7)
IMM GRANULOCYTES # BLD: <0.03 10E3/UL
IMM GRANULOCYTES NFR BLD: 0.2 %
LYMPHOCYTES # BLD AUTO: 1.3 10E3/UL (ref 0.8–5.3)
LYMPHOCYTES NFR BLD AUTO: 16.2 %
MCH RBC QN AUTO: 29.7 PG (ref 26.5–33)
MCHC RBC AUTO-ENTMCNC: 33 G/DL (ref 31.5–36.5)
MCV RBC AUTO: 90 FL (ref 78–100)
MONOCYTES # BLD AUTO: 0.65 10E3/UL (ref 0–1.3)
MONOCYTES NFR BLD AUTO: 8.1 %
NEUTROPHILS # BLD AUTO: 5.68 10E3/UL (ref 1.6–8.3)
NEUTROPHILS NFR BLD AUTO: 70.7 %
NRBC # BLD AUTO: <0.03 10E3/UL
NRBC BLD AUTO-RTO: 0 /100
PLATELET # BLD AUTO: 260 10E3/UL (ref 150–450)
POTASSIUM SERPL-SCNC: 3.9 MMOL/L (ref 3.4–5.3)
PROT SERPL-MCNC: 7.5 G/DL (ref 6.4–8.3)
RBC # BLD AUTO: 4.78 10E6/UL (ref 3.8–5.2)
SODIUM SERPL-SCNC: 139 MMOL/L (ref 135–145)
TSH SERPL DL<=0.005 MIU/L-ACNC: 0.76 UIU/ML (ref 0.3–4.2)
WBC # BLD AUTO: 8.03 10E3/UL (ref 4–11)

## 2025-08-25 PROCEDURE — 250N000011 HC RX IP 250 OP 636: Mod: JZ | Performed by: STUDENT IN AN ORGANIZED HEALTH CARE EDUCATION/TRAINING PROGRAM

## 2025-08-25 PROCEDURE — 85004 AUTOMATED DIFF WBC COUNT: CPT

## 2025-08-25 PROCEDURE — G2211 COMPLEX E/M VISIT ADD ON: HCPCS | Performed by: STUDENT IN AN ORGANIZED HEALTH CARE EDUCATION/TRAINING PROGRAM

## 2025-08-25 PROCEDURE — 96413 CHEMO IV INFUSION 1 HR: CPT

## 2025-08-25 PROCEDURE — 258N000003 HC RX IP 258 OP 636: Performed by: STUDENT IN AN ORGANIZED HEALTH CARE EDUCATION/TRAINING PROGRAM

## 2025-08-25 PROCEDURE — 36415 COLL VENOUS BLD VENIPUNCTURE: CPT

## 2025-08-25 PROCEDURE — 99215 OFFICE O/P EST HI 40 MIN: CPT | Performed by: STUDENT IN AN ORGANIZED HEALTH CARE EDUCATION/TRAINING PROGRAM

## 2025-08-25 PROCEDURE — 84443 ASSAY THYROID STIM HORMONE: CPT | Performed by: INTERNAL MEDICINE

## 2025-08-25 PROCEDURE — 80053 COMPREHEN METABOLIC PANEL: CPT | Performed by: INTERNAL MEDICINE

## 2025-08-25 PROCEDURE — 99213 OFFICE O/P EST LOW 20 MIN: CPT | Mod: 25 | Performed by: STUDENT IN AN ORGANIZED HEALTH CARE EDUCATION/TRAINING PROGRAM

## 2025-08-25 RX ORDER — ALBUTEROL SULFATE 90 UG/1
1-2 INHALANT RESPIRATORY (INHALATION)
Status: CANCELLED
Start: 2025-08-25

## 2025-08-25 RX ORDER — HEPARIN SODIUM (PORCINE) LOCK FLUSH IV SOLN 100 UNIT/ML 100 UNIT/ML
5 SOLUTION INTRAVENOUS
Status: CANCELLED | OUTPATIENT
Start: 2025-08-25

## 2025-08-25 RX ORDER — MEPERIDINE HYDROCHLORIDE 25 MG/ML
25 INJECTION INTRAMUSCULAR; INTRAVENOUS; SUBCUTANEOUS
Status: CANCELLED | OUTPATIENT
Start: 2025-08-25

## 2025-08-25 RX ORDER — DIPHENHYDRAMINE HYDROCHLORIDE 50 MG/ML
25 INJECTION, SOLUTION INTRAMUSCULAR; INTRAVENOUS
Status: CANCELLED
Start: 2025-08-25

## 2025-08-25 RX ORDER — LORAZEPAM 2 MG/ML
0.5 INJECTION INTRAMUSCULAR EVERY 4 HOURS PRN
Status: CANCELLED | OUTPATIENT
Start: 2025-08-25

## 2025-08-25 RX ORDER — HEPARIN SODIUM,PORCINE 10 UNIT/ML
5-20 VIAL (ML) INTRAVENOUS DAILY PRN
Status: CANCELLED | OUTPATIENT
Start: 2025-08-25

## 2025-08-25 RX ORDER — EPINEPHRINE 1 MG/ML
0.3 INJECTION, SOLUTION INTRAMUSCULAR; SUBCUTANEOUS EVERY 5 MIN PRN
Status: CANCELLED | OUTPATIENT
Start: 2025-08-25

## 2025-08-25 RX ORDER — METHYLPREDNISOLONE SODIUM SUCCINATE 40 MG/ML
40 INJECTION INTRAMUSCULAR; INTRAVENOUS
Status: CANCELLED
Start: 2025-08-25

## 2025-08-25 RX ORDER — ALBUTEROL SULFATE 0.83 MG/ML
2.5 SOLUTION RESPIRATORY (INHALATION)
Status: CANCELLED | OUTPATIENT
Start: 2025-08-25

## 2025-08-25 RX ORDER — DIPHENHYDRAMINE HYDROCHLORIDE 50 MG/ML
50 INJECTION, SOLUTION INTRAMUSCULAR; INTRAVENOUS
Status: CANCELLED
Start: 2025-08-25

## 2025-08-25 RX ADMIN — SODIUM CHLORIDE 200 MG: 9 INJECTION, SOLUTION INTRAVENOUS at 15:50

## 2025-08-25 RX ADMIN — SODIUM CHLORIDE 250 ML: 0.9 INJECTION, SOLUTION INTRAVENOUS at 15:50

## 2025-08-25 ASSESSMENT — PAIN SCALES - GENERAL: PAINLEVEL_OUTOF10: NO PAIN (0)

## 2025-08-26 ENCOUNTER — TELEPHONE (OUTPATIENT)
Dept: ORTHOPEDICS | Facility: CLINIC | Age: 65
End: 2025-08-26
Payer: COMMERCIAL

## 2025-08-29 ENCOUNTER — OFFICE VISIT (OUTPATIENT)
Dept: FAMILY MEDICINE | Facility: CLINIC | Age: 65
End: 2025-08-29
Payer: COMMERCIAL

## 2025-08-29 VITALS
HEART RATE: 70 BPM | OXYGEN SATURATION: 98 % | WEIGHT: 202 LBS | BODY MASS INDEX: 37.17 KG/M2 | RESPIRATION RATE: 20 BRPM | SYSTOLIC BLOOD PRESSURE: 132 MMHG | DIASTOLIC BLOOD PRESSURE: 76 MMHG | HEIGHT: 62 IN | TEMPERATURE: 98.1 F

## 2025-08-29 DIAGNOSIS — E66.01 MORBID OBESITY (H): ICD-10-CM

## 2025-08-29 DIAGNOSIS — R22.42 MASS OF LEFT LOWER EXTREMITY: ICD-10-CM

## 2025-08-29 DIAGNOSIS — C49.9 SARCOMA OF SOFT TISSUE (H): ICD-10-CM

## 2025-08-29 DIAGNOSIS — Z01.818 PREOP GENERAL PHYSICAL EXAM: Primary | ICD-10-CM

## 2025-08-29 DIAGNOSIS — I10 HYPERTENSION GOAL BP (BLOOD PRESSURE) < 140/90: ICD-10-CM

## 2025-08-29 PROCEDURE — 1126F AMNT PAIN NOTED NONE PRSNT: CPT | Performed by: PHYSICIAN ASSISTANT

## 2025-08-29 PROCEDURE — 3078F DIAST BP <80 MM HG: CPT | Performed by: PHYSICIAN ASSISTANT

## 2025-08-29 PROCEDURE — 99214 OFFICE O/P EST MOD 30 MIN: CPT | Performed by: PHYSICIAN ASSISTANT

## 2025-08-29 PROCEDURE — 93000 ELECTROCARDIOGRAM COMPLETE: CPT | Performed by: PHYSICIAN ASSISTANT

## 2025-08-29 PROCEDURE — 3075F SYST BP GE 130 - 139MM HG: CPT | Performed by: PHYSICIAN ASSISTANT

## 2025-08-29 ASSESSMENT — PAIN SCALES - GENERAL: PAINLEVEL_OUTOF10: NO PAIN (0)
